# Patient Record
Sex: MALE | Race: WHITE | NOT HISPANIC OR LATINO | Employment: FULL TIME | URBAN - METROPOLITAN AREA
[De-identification: names, ages, dates, MRNs, and addresses within clinical notes are randomized per-mention and may not be internally consistent; named-entity substitution may affect disease eponyms.]

---

## 2017-01-29 LAB
25(OH)D3+25(OH)D2 SERPL-MCNC: 20.1 NG/ML (ref 30–100)
ALBUMIN SERPL-MCNC: 4.1 G/DL (ref 3.5–5.2)
ALBUMIN/GLOB SERPL: 1.7 G/DL
ALP SERPL-CCNC: 94 U/L (ref 39–117)
ALT SERPL-CCNC: 15 U/L (ref 1–41)
APPEARANCE UR: CLEAR
AST SERPL-CCNC: 13 U/L (ref 1–40)
BILIRUB SERPL-MCNC: 0.5 MG/DL (ref 0.1–1.2)
BILIRUB UR QL STRIP: NEGATIVE
BUN SERPL-MCNC: 15 MG/DL (ref 8–23)
BUN/CREAT SERPL: 15.6 (ref 7–25)
CALCIUM SERPL-MCNC: 9.6 MG/DL (ref 8.6–10.5)
CHLORIDE SERPL-SCNC: 100 MMOL/L (ref 98–107)
CHOLEST SERPL-MCNC: 122 MG/DL (ref 100–199)
CK SERPL-CCNC: 213 U/L (ref 20–200)
CO2 SERPL-SCNC: 24.2 MMOL/L (ref 22–29)
COLOR UR: YELLOW
CREAT SERPL-MCNC: 0.96 MG/DL (ref 0.76–1.27)
ERYTHROCYTE [DISTWIDTH] IN BLOOD BY AUTOMATED COUNT: 13.7 % (ref 11.5–14.5)
GLOBULIN SER CALC-MCNC: 2.4 GM/DL
GLUCOSE SERPL-MCNC: 175 MG/DL (ref 65–99)
GLUCOSE UR QL: NEGATIVE
HBA1C MFR BLD: 7.9 % (ref 4.8–5.6)
HCT VFR BLD AUTO: 45.4 % (ref 40.4–52.2)
HDL SERPL-SCNC: 31.8 UMOL/L
HDLC SERPL-MCNC: 44 MG/DL
HGB BLD-MCNC: 14.1 G/DL (ref 13.7–17.6)
HGB UR QL STRIP: NEGATIVE
KETONES UR QL STRIP: NEGATIVE
LDL SERPL QN: 20.6 NM
LDL SERPL-SCNC: 687 NMOL/L
LDL SMALL SERPL-SCNC: 313 NMOL/L
LDLC SERPL CALC-MCNC: 47 MG/DL (ref 0–99)
LEUKOCYTE ESTERASE UR QL STRIP: NEGATIVE
MCH RBC QN AUTO: 28.6 PG (ref 27–32.7)
MCHC RBC AUTO-ENTMCNC: 31.1 G/DL (ref 32.6–36.4)
MCV RBC AUTO: 92.1 FL (ref 79.8–96.2)
NITRITE UR QL STRIP: NEGATIVE
PH UR STRIP: 6 [PH] (ref 5–8)
PLATELET # BLD AUTO: 203 10*3/MM3 (ref 140–500)
POTASSIUM SERPL-SCNC: 4.5 MMOL/L (ref 3.5–5.2)
PROT SERPL-MCNC: 6.5 G/DL (ref 6–8.5)
PROT UR QL STRIP: NEGATIVE
PSA SERPL-MCNC: 0.32 NG/ML (ref 0–4)
RBC # BLD AUTO: 4.93 10*6/MM3 (ref 4.6–6)
SODIUM SERPL-SCNC: 139 MMOL/L (ref 136–145)
SP GR UR: 1.02 (ref 1–1.03)
T3FREE SERPL-MCNC: 3 PG/ML (ref 2–4.4)
T4 FREE SERPL-MCNC: 0.97 NG/DL (ref 0.93–1.7)
TRIGL SERPL-MCNC: 156 MG/DL (ref 0–149)
TSH SERPL DL<=0.005 MIU/L-ACNC: 4.01 MIU/ML (ref 0.27–4.2)
UROBILINOGEN UR STRIP-MCNC: NORMAL MG/DL
WBC # BLD AUTO: 10.02 10*3/MM3 (ref 4.5–10.7)

## 2017-02-03 ENCOUNTER — OFFICE VISIT (OUTPATIENT)
Dept: INTERNAL MEDICINE | Facility: CLINIC | Age: 62
End: 2017-02-03

## 2017-02-03 VITALS
HEIGHT: 75 IN | HEART RATE: 91 BPM | WEIGHT: 315 LBS | OXYGEN SATURATION: 94 % | DIASTOLIC BLOOD PRESSURE: 72 MMHG | SYSTOLIC BLOOD PRESSURE: 124 MMHG | BODY MASS INDEX: 39.17 KG/M2

## 2017-02-03 DIAGNOSIS — R35.1 NOCTURIA: Chronic | ICD-10-CM

## 2017-02-03 DIAGNOSIS — Z00.00 ROUTINE PHYSICAL EXAMINATION: Primary | ICD-10-CM

## 2017-02-03 DIAGNOSIS — Z87.39 HISTORY OF GOUT: Chronic | ICD-10-CM

## 2017-02-03 DIAGNOSIS — D64.9 CHRONIC ANEMIA: Chronic | ICD-10-CM

## 2017-02-03 DIAGNOSIS — Z51.81 THERAPEUTIC DRUG MONITORING: ICD-10-CM

## 2017-02-03 DIAGNOSIS — E11.9 ENCOUNTER FOR DIABETIC FOOT EXAM (HCC): ICD-10-CM

## 2017-02-03 DIAGNOSIS — Z23 NEED FOR PROPHYLACTIC VACCINATION AGAINST STREPTOCOCCUS PNEUMONIAE (PNEUMOCOCCUS): ICD-10-CM

## 2017-02-03 DIAGNOSIS — Z90.49 HISTORY OF PARTIAL COLECTOMY: Chronic | ICD-10-CM

## 2017-02-03 DIAGNOSIS — N32.89 IRRITABLE BLADDER: Chronic | ICD-10-CM

## 2017-02-03 DIAGNOSIS — E11.9 DIABETIC EYE EXAM (HCC): Chronic | ICD-10-CM

## 2017-02-03 DIAGNOSIS — Z01.00 DIABETIC EYE EXAM (HCC): Chronic | ICD-10-CM

## 2017-02-03 DIAGNOSIS — I10 BENIGN ESSENTIAL HYPERTENSION: Chronic | ICD-10-CM

## 2017-02-03 DIAGNOSIS — N40.1 BENIGN NON-NODULAR PROSTATIC HYPERPLASIA WITH LOWER URINARY TRACT SYMPTOMS: Chronic | ICD-10-CM

## 2017-02-03 DIAGNOSIS — Z92.29 HISTORY OF PNEUMOCOCCAL VACCINATION: ICD-10-CM

## 2017-02-03 DIAGNOSIS — E55.9 VITAMIN D DEFICIENCY: Chronic | ICD-10-CM

## 2017-02-03 DIAGNOSIS — K52.9 CHRONIC DIARRHEA: Chronic | ICD-10-CM

## 2017-02-03 DIAGNOSIS — E11.9 TYPE 2 DIABETES MELLITUS WITHOUT COMPLICATION, WITHOUT LONG-TERM CURRENT USE OF INSULIN (HCC): Chronic | ICD-10-CM

## 2017-02-03 DIAGNOSIS — E03.9 HYPOTHYROIDISM, UNSPECIFIED TYPE: Chronic | ICD-10-CM

## 2017-02-03 DIAGNOSIS — E78.5 HYPERLIPIDEMIA, UNSPECIFIED HYPERLIPIDEMIA TYPE: Chronic | ICD-10-CM

## 2017-02-03 PROCEDURE — 90471 IMMUNIZATION ADMIN: CPT | Performed by: INTERNAL MEDICINE

## 2017-02-03 PROCEDURE — 99214 OFFICE O/P EST MOD 30 MIN: CPT | Performed by: INTERNAL MEDICINE

## 2017-02-03 PROCEDURE — 99396 PREV VISIT EST AGE 40-64: CPT | Performed by: INTERNAL MEDICINE

## 2017-02-03 PROCEDURE — 90732 PPSV23 VACC 2 YRS+ SUBQ/IM: CPT | Performed by: INTERNAL MEDICINE

## 2017-02-03 NOTE — PROGRESS NOTES
02/03/2017    Patient Information  Navarro Bruno                                                                                          [address] [facility] [address]      1955  [phone]      Chief Complaint:     Routine physical examination and follow-up.  No new acute complaints but has return of irritable bladder symptoms and nocturia.    History of Present Illness:    Patient with a history of type 2 diabetes that has not been an optimal control, hyperlipidemia, hypothyroidism, irritable bladder and nocturia, chronic diarrhea, history of gout, BPH, history of partial colectomy due to an obstruction, hypertension, chronic anemia.  He presents today for his routine annual exam and follow-up lab work.  He is having return of irritable bladder symptoms and this will be described below.  Past medical history reviewed and updated where necessary.  This reveals patient needs his initial pneumococcal vaccination.  It also reveals he is up-to-date on his colonoscopy.  He needs to have a diabetic eye examination.  Diabetic foot exam performed today.    Review of Systems   Constitution: Negative.   HENT: Negative.    Eyes: Negative.    Cardiovascular: Negative.    Respiratory: Negative.    Endocrine: Negative.    Hematologic/Lymphatic: Negative.    Skin: Negative.    Musculoskeletal: Negative.    Gastrointestinal: Negative.    Genitourinary: Positive for frequency, nocturia and urgency.   Neurological: Negative.    Psychiatric/Behavioral: Negative.    Allergic/Immunologic: Negative.        Active Problems:    Patient Active Problem List   Diagnosis   • Chronic anemia   • Benign essential hypertension   • Cataracts, bilateral   • Hyperlipidemia   • Hypothyroidism   • Irritable bladder   • Nocturia   • Type 2 diabetes mellitus   • Vitamin D deficiency   • Therapeutic drug monitoring   • Routine physical examination   • Chronic diarrhea   • History of partial colectomy,  12/17/2014--ileo-cecal colectomy with primary reanastomosis.  Obstruction secondary to NSAID-induced inflamed ileum.   • History of gout   • Benign prostatic hypertrophy   • Diabetic eye exam   • Diabetic foot exam         Past Medical History   Diagnosis Date   • History of echocardiogram 12/14/2014 12/14/2014--echocardiogram reveals left ventricular chamber size is normal. Mild concentric left ventricular hypertrophy. Normal left ventricular systolic function with ejection fraction of 58%. Abnormal left ventricular diastolic function is observed. Left Atrium normal. Right ventricular cavity size is mildly enlarged. The right ventricular global systolic function is normal. Right atrium is nor   • History of Hepatitis, infectious Age 18     18 years of age--patient had infectious hepatitis. Exact type unknown.   • History of partial colectomy, 12/17/2014--ileo-cecal colectomy with primary reanastomosis. 12/17/2014 12/17/2014--patient presented with a small bowel obstruction secondary to inflamed terminal ileum. He underwent ileo-cecal colectomy with primary reanastomosis.   • History of pneumococcal vaccination 2/3/2017     02/03/2017--PPSV 23 given.  Patient is pneumococcal vaccination naïve.   • History of small bowel obstruction 12/15/2014     01/19/2015--patient seen in follow-up and is doing well. No change in current medical regimen at the present time. Set up fasting lab and follow up. I explained to patient that we will need to monitor him for the development of vitamin B12 deficiency. He will certainly be at risk for this.   12/17/2014--patient presented with a small bowel obstruction secondary to inflamed terminal ileum. He under         Past Surgical History   Procedure Laterality Date   • Colonoscopy  04/06/2015 04/06/2015--colonoscopy revealed small internal hemorrhoids. Well healed ileocolic anastomosis. Fair prep.   • Exploratory laparotomy  12/17/2014 12/17/2014--patient presented  with a small bowel obstruction secondary to inflamed terminal ileum. He underwent ileo-cecal colectomy with primary reanastomosis.   • Colectomy partial / total  12/17/2014 12/17/2014--patient presented with a small bowel obstruction secondary to inflamed terminal ileum. He underwent ileo-cecal colectomy with primary reanastomosis.   • Amputation foot / toe  --     19 years of age--left great toe and left second toe amputation from a lawnmower accident.   • Appendectomy  --     11 years of age.         No Known Allergies        Current Outpatient Prescriptions:   •  aspirin 81 MG tablet, Take 1 tablet by mouth daily., Disp: , Rfl:   •  atorvastatin (LIPITOR) 80 MG tablet, Take 1 tablet by mouth every night., Disp: 30 tablet, Rfl: 11  •  Cholecalciferol (VITAMIN D3) 5000 UNITS capsule capsule, 1 by mouth daily as directed, Disp: 30 capsule, Rfl: 11  •  glimepiride (AMARYL) 4 MG tablet, 1 by mouth daily for diabetes, Disp: 90 tablet, Rfl: 3  •  metoprolol tartrate (LOPRESSOR) 25 MG tablet, Take 1 tablet by mouth 2 (two) times a day., Disp: 180 tablet, Rfl: 3  •  SitaGLIPtin-MetFORMIN HCl ER (JANUMET XR)  MG tablet sustained-release 24 hour, 1 by mouth twice a day with food, Disp: 60 tablet, Rfl: 11      Family History   Problem Relation Age of Onset   • Scoliosis Mother      Amyotrophic Lateral Sclerosis   • Stroke Father      Father had multiple strokes.   • Diabetes Father      Type 2   • Diabetes Paternal Grandfather      Type 2         Social History     Social History   • Marital status:      Spouse name: N/A   • Number of children: N/A   • Years of education: N/A     Occupational History   •  for [employer]      Social History Main Topics   • Smoking status: Former Smoker     Quit date: 12/2014   • Smokeless tobacco: Never Used   • Alcohol use Yes      Comment: Minimum consumption   • Drug use: No   • Sexual activity: Yes     Partners: Female     Other Topics Concern   •  "Not on file     Social History Narrative         Vitals:    02/03/17 1101   BP: 124/72   Pulse: 91   SpO2: 94%   Weight: (!) 333 lb (151 kg)   Height: 75\" (190.5 cm)          Physical Exam:    General: Alert and oriented x 3, with appropriate affect; no acute distress. Obese. HEENT: pupils equal, round, and reactive to light; extraocular movements intact; sclera nonicteric; nasal mucosa normal; pharynx normal; tympanic membranes and ear canals normal.  Neck: without JVD, thyromegaly, bruit, or adenopathy.  Lungs: clear to auscultation in all fields.  Heart: auscultation reveals regular rate and rhythm without murmur, rub, gallop, or click.  Abdomen: is soft and nontender, without hepato-splenomegaly, mass or hernia. Normal bowel sounds; .  Urologic exam: reveals normal male genitalia without testicular mass or penile/scrotal lesion.  Digital rectal exam and Prostate: deferred.  Extremities: are without clubbing, cyanosis, or edema.  Vascular: no signs of peripheral arterial disease or venous insufficiency/varicosities.  Neurological: intact without focal deficit, including cranial and peripheral nerves.  Station and gait observed to be normal during ingress and egress from the examination area.  Sensation and deep tendon reflexes tested if clinically indicated and are normal.  Musculoskeletal: exam is normal, without signs of synovitis, significant degeneration or deformity. Skin examination: without rash or significant lesions.  Examination of his feet documented under diabetic foot exam.      Lab/other results:    NMR is nearly perfect.  The only thing abnormal is his triglycerides are slightly elevated 156.  CMP normal except blood sugar elevated 175.  Urinalysis normal.  CBC normal.  Hemoglobin A1c 7.9.  Thyroid function tests normal.  PSA normal at 0.321.  Vitamin D low at 20.1.  CPK slightly elevated at 213.    Assessment/Plan:     Diagnosis Plan   1. Routine physical examination     2. Type 2 diabetes " mellitus without complication, without long-term current use of insulin     3. Hyperlipidemia, unspecified hyperlipidemia type     4. Hypothyroidism, unspecified type     5. Irritable bladder     6. Nocturia     7. Chronic diarrhea     8. History of gout     9. Benign prostatic hypertrophy     10. History of partial colectomy, 12/17/2014--ileo-cecal colectomy with primary reanastomosis.  Obstruction secondary to NSAID-induced inflamed ileum.     11. Vitamin D deficiency     12. Benign essential hypertension     13. Chronic anemia     14. Diabetic foot exam     15. History of pneumococcal vaccination     16. Diabetic eye exam     17. Therapeutic drug monitoring         Patient presents with essentially normal annual exam except for the following issues: Type 2 diabetes is not at goal and therefore medication adjustment indicated.  We discussed various options to reach our goal.  I do not think that it would be in his best interest to add a glucosuric agent such as Farxiga because of his underlying irritable bladder symptoms and nocturia.  The least expensive thing to do would be to increase the glimepiride to twice daily but this will cause him to gain even more weight.  His weight is up quite a bit from the last visit.  Overall, I think the best thing to do would be to start Victoza.  Hyperlipidemia is under excellent control and we will continue the generic Lipitor.  Thyroid function tests are normal and there may not be a problem with hypothyroidism at all.  Patient has return of irritable bladder symptoms and nocturia that seems to be intermittent.  Poorly controlled diabetes could be contributing to this but I will reinitiate Myrbetriq.  No significant obstructive BPH symptoms.  His vitamin D is low and he has been taking 5000 units of vitamin D on a regular basis.  I've asked him to check and make sure this is vitamin D3 and he should take it with food.  I am reluctant to increase his dose any higher at the  present time.  Blood pressure controlled.  Patient needs a diabetic eye examination.  Patient has a history of gout and stopped taking his allopurinol because he had not had an attack for quite some time.  This does need to be reassessed.    Plan is as follows: Start Victoza and titrate up to 1.8 mg subcutaneously daily.  Patient should continue Janumet and glimepiride.  Check uric acid level today and follow-up on the phone for the results.  PPSV 23 given.  No further changes in medical regimen.  Ophthalmology referral given.           Procedures

## 2017-02-04 ENCOUNTER — RESULTS ENCOUNTER (OUTPATIENT)
Dept: INTERNAL MEDICINE | Facility: CLINIC | Age: 62
End: 2017-02-04

## 2017-02-04 DIAGNOSIS — E03.9 HYPOTHYROIDISM, UNSPECIFIED TYPE: Chronic | ICD-10-CM

## 2017-02-04 DIAGNOSIS — E55.9 VITAMIN D DEFICIENCY: Chronic | ICD-10-CM

## 2017-02-04 DIAGNOSIS — E78.5 HYPERLIPIDEMIA, UNSPECIFIED HYPERLIPIDEMIA TYPE: Chronic | ICD-10-CM

## 2017-02-04 DIAGNOSIS — D64.9 CHRONIC ANEMIA: Chronic | ICD-10-CM

## 2017-02-04 DIAGNOSIS — E11.9 TYPE 2 DIABETES MELLITUS WITHOUT COMPLICATION, WITHOUT LONG-TERM CURRENT USE OF INSULIN (HCC): Chronic | ICD-10-CM

## 2017-02-04 LAB — URATE SERPL-MCNC: 7.3 MG/DL (ref 3.4–7)

## 2017-03-06 DIAGNOSIS — E11.9 TYPE 2 DIABETES MELLITUS WITHOUT COMPLICATION, WITHOUT LONG-TERM CURRENT USE OF INSULIN (HCC): Chronic | ICD-10-CM

## 2017-04-03 DIAGNOSIS — E11.9 TYPE 2 DIABETES MELLITUS WITHOUT COMPLICATION (HCC): ICD-10-CM

## 2017-04-03 RX ORDER — GLIMEPIRIDE 4 MG/1
TABLET ORAL
Qty: 90 TABLET | Refills: 0 | Status: SHIPPED | OUTPATIENT
Start: 2017-04-03 | End: 2017-07-19 | Stop reason: SDUPTHER

## 2017-04-21 DIAGNOSIS — I10 BENIGN ESSENTIAL HYPERTENSION: ICD-10-CM

## 2017-04-21 DIAGNOSIS — E78.5 HYPERLIPIDEMIA: ICD-10-CM

## 2017-04-24 RX ORDER — ATORVASTATIN CALCIUM 80 MG/1
TABLET, FILM COATED ORAL
Qty: 30 TABLET | Refills: 0 | Status: SHIPPED | OUTPATIENT
Start: 2017-04-24 | End: 2017-06-02 | Stop reason: SDUPTHER

## 2017-05-11 DIAGNOSIS — E78.5 HYPERLIPIDEMIA, UNSPECIFIED HYPERLIPIDEMIA TYPE: Chronic | ICD-10-CM

## 2017-05-11 DIAGNOSIS — E03.9 ACQUIRED HYPOTHYROIDISM: Chronic | ICD-10-CM

## 2017-05-11 DIAGNOSIS — I10 BENIGN ESSENTIAL HYPERTENSION: Primary | Chronic | ICD-10-CM

## 2017-05-11 DIAGNOSIS — Z51.81 THERAPEUTIC DRUG MONITORING: ICD-10-CM

## 2017-05-11 DIAGNOSIS — E11.00 TYPE 2 DIABETES MELLITUS WITH HYPEROSMOLARITY WITHOUT COMA, WITHOUT LONG-TERM CURRENT USE OF INSULIN (HCC): Chronic | ICD-10-CM

## 2017-05-11 DIAGNOSIS — Z11.59 NEED FOR HEPATITIS C SCREENING TEST: ICD-10-CM

## 2017-05-11 DIAGNOSIS — E55.9 VITAMIN D DEFICIENCY: Chronic | ICD-10-CM

## 2017-05-12 ENCOUNTER — RESULTS ENCOUNTER (OUTPATIENT)
Dept: INTERNAL MEDICINE | Facility: CLINIC | Age: 62
End: 2017-05-12

## 2017-05-12 DIAGNOSIS — Z11.59 NEED FOR HEPATITIS C SCREENING TEST: ICD-10-CM

## 2017-05-12 DIAGNOSIS — Z51.81 THERAPEUTIC DRUG MONITORING: ICD-10-CM

## 2017-05-12 DIAGNOSIS — E03.9 ACQUIRED HYPOTHYROIDISM: Chronic | ICD-10-CM

## 2017-05-12 DIAGNOSIS — E78.5 HYPERLIPIDEMIA, UNSPECIFIED HYPERLIPIDEMIA TYPE: Chronic | ICD-10-CM

## 2017-05-12 DIAGNOSIS — E11.00 TYPE 2 DIABETES MELLITUS WITH HYPEROSMOLARITY WITHOUT COMA, WITHOUT LONG-TERM CURRENT USE OF INSULIN (HCC): Chronic | ICD-10-CM

## 2017-05-12 DIAGNOSIS — I10 BENIGN ESSENTIAL HYPERTENSION: Chronic | ICD-10-CM

## 2017-05-12 DIAGNOSIS — E55.9 VITAMIN D DEFICIENCY: Chronic | ICD-10-CM

## 2017-05-14 LAB
25(OH)D3+25(OH)D2 SERPL-MCNC: 21 NG/ML (ref 30–100)
ALBUMIN SERPL-MCNC: 4.1 G/DL (ref 3.5–5.2)
ALBUMIN/GLOB SERPL: 1.4 G/DL
ALP SERPL-CCNC: 98 U/L (ref 39–117)
ALT SERPL-CCNC: 14 U/L (ref 1–41)
APPEARANCE UR: CLEAR
AST SERPL-CCNC: 16 U/L (ref 1–40)
BILIRUB SERPL-MCNC: 0.6 MG/DL (ref 0.1–1.2)
BILIRUB UR QL STRIP: NEGATIVE
BUN SERPL-MCNC: 17 MG/DL (ref 8–23)
BUN/CREAT SERPL: 16.2 (ref 7–25)
CALCIUM SERPL-MCNC: 10 MG/DL (ref 8.6–10.5)
CHLORIDE SERPL-SCNC: 101 MMOL/L (ref 98–107)
CHOLEST SERPL-MCNC: 119 MG/DL (ref 100–199)
CK SERPL-CCNC: 96 U/L (ref 20–200)
CO2 SERPL-SCNC: 23 MMOL/L (ref 22–29)
COLOR UR: YELLOW
CREAT SERPL-MCNC: 1.05 MG/DL (ref 0.76–1.27)
ERYTHROCYTE [DISTWIDTH] IN BLOOD BY AUTOMATED COUNT: 14 % (ref 11.5–14.5)
GLOBULIN SER CALC-MCNC: 2.9 GM/DL
GLUCOSE SERPL-MCNC: 188 MG/DL (ref 65–99)
GLUCOSE UR QL: NEGATIVE
HBA1C MFR BLD: 8.3 % (ref 4.8–5.6)
HCT VFR BLD AUTO: 43.5 % (ref 40.4–52.2)
HCV AB S/CO SERPL IA: <0.1 S/CO RATIO (ref 0–0.9)
HDL SERPL-SCNC: 31.4 UMOL/L
HDLC SERPL-MCNC: 40 MG/DL
HGB BLD-MCNC: 14 G/DL (ref 13.7–17.6)
HGB UR QL STRIP: NEGATIVE
KETONES UR QL STRIP: NEGATIVE
LDL SERPL QN: 20.1 NM
LDL SERPL-SCNC: 770 NMOL/L
LDL SMALL SERPL-SCNC: 478 NMOL/L
LDLC SERPL CALC-MCNC: 44 MG/DL (ref 0–99)
LEUKOCYTE ESTERASE UR QL STRIP: NEGATIVE
MCH RBC QN AUTO: 29.2 PG (ref 27–32.7)
MCHC RBC AUTO-ENTMCNC: 32.2 G/DL (ref 32.6–36.4)
MCV RBC AUTO: 90.6 FL (ref 79.8–96.2)
NITRITE UR QL STRIP: NEGATIVE
PH UR STRIP: 6 [PH] (ref 5–8)
PLATELET # BLD AUTO: 230 10*3/MM3 (ref 140–500)
POTASSIUM SERPL-SCNC: 4.5 MMOL/L (ref 3.5–5.2)
PROT SERPL-MCNC: 7 G/DL (ref 6–8.5)
PROT UR QL STRIP: NEGATIVE
RBC # BLD AUTO: 4.8 10*6/MM3 (ref 4.6–6)
SODIUM SERPL-SCNC: 141 MMOL/L (ref 136–145)
SP GR UR: 1.02 (ref 1–1.03)
T3FREE SERPL-MCNC: 3.2 PG/ML (ref 2–4.4)
T4 FREE SERPL-MCNC: 1.11 NG/DL (ref 0.93–1.7)
TRIGL SERPL-MCNC: 173 MG/DL (ref 0–149)
TSH SERPL DL<=0.005 MIU/L-ACNC: 5.33 MIU/ML (ref 0.27–4.2)
UROBILINOGEN UR STRIP-MCNC: NORMAL MG/DL
WBC # BLD AUTO: 9.42 10*3/MM3 (ref 4.5–10.7)

## 2017-05-19 ENCOUNTER — OFFICE VISIT (OUTPATIENT)
Dept: INTERNAL MEDICINE | Facility: CLINIC | Age: 62
End: 2017-05-19

## 2017-05-19 VITALS
OXYGEN SATURATION: 96 % | SYSTOLIC BLOOD PRESSURE: 140 MMHG | WEIGHT: 315 LBS | BODY MASS INDEX: 39.17 KG/M2 | DIASTOLIC BLOOD PRESSURE: 76 MMHG | HEART RATE: 89 BPM | HEIGHT: 75 IN

## 2017-05-19 DIAGNOSIS — I10 BENIGN ESSENTIAL HYPERTENSION: Chronic | ICD-10-CM

## 2017-05-19 DIAGNOSIS — Z87.39 HISTORY OF GOUT: Chronic | ICD-10-CM

## 2017-05-19 DIAGNOSIS — D64.9 CHRONIC ANEMIA: Chronic | ICD-10-CM

## 2017-05-19 DIAGNOSIS — E11.9 TYPE 2 DIABETES MELLITUS WITHOUT COMPLICATION, WITHOUT LONG-TERM CURRENT USE OF INSULIN (HCC): Primary | Chronic | ICD-10-CM

## 2017-05-19 DIAGNOSIS — E78.5 HYPERLIPIDEMIA, UNSPECIFIED HYPERLIPIDEMIA TYPE: Chronic | ICD-10-CM

## 2017-05-19 DIAGNOSIS — Z51.81 THERAPEUTIC DRUG MONITORING: ICD-10-CM

## 2017-05-19 DIAGNOSIS — E03.9 HYPOTHYROIDISM, UNSPECIFIED TYPE: Chronic | ICD-10-CM

## 2017-05-19 DIAGNOSIS — E55.9 VITAMIN D DEFICIENCY: Chronic | ICD-10-CM

## 2017-05-19 PROCEDURE — 99214 OFFICE O/P EST MOD 30 MIN: CPT | Performed by: INTERNAL MEDICINE

## 2017-05-19 RX ORDER — PEN NEEDLE, DIABETIC 31 GX5/16"
NEEDLE, DISPOSABLE MISCELLANEOUS
Refills: 3 | COMMUNITY
Start: 2017-05-15

## 2017-05-19 RX ORDER — LEVOTHYROXINE SODIUM 0.05 MG/1
TABLET ORAL
Qty: 30 TABLET | Refills: 6 | Status: SHIPPED | OUTPATIENT
Start: 2017-05-19 | End: 2017-12-21 | Stop reason: SDUPTHER

## 2017-06-02 DIAGNOSIS — E78.5 HYPERLIPIDEMIA: ICD-10-CM

## 2017-06-02 RX ORDER — ATORVASTATIN CALCIUM 80 MG/1
TABLET, FILM COATED ORAL
Qty: 30 TABLET | Refills: 2 | Status: SHIPPED | OUTPATIENT
Start: 2017-06-02 | End: 2017-12-10 | Stop reason: SDUPTHER

## 2017-07-19 DIAGNOSIS — E11.9 TYPE 2 DIABETES MELLITUS WITHOUT COMPLICATION (HCC): ICD-10-CM

## 2017-07-19 RX ORDER — GLIMEPIRIDE 4 MG/1
TABLET ORAL
Qty: 30 TABLET | Refills: 0 | Status: SHIPPED | OUTPATIENT
Start: 2017-07-19 | End: 2017-08-23 | Stop reason: SDUPTHER

## 2017-08-01 DIAGNOSIS — I10 BENIGN ESSENTIAL HYPERTENSION: ICD-10-CM

## 2017-08-01 DIAGNOSIS — E11.9 TYPE 2 DIABETES MELLITUS WITHOUT COMPLICATION, WITHOUT LONG-TERM CURRENT USE OF INSULIN (HCC): Chronic | ICD-10-CM

## 2017-08-10 DIAGNOSIS — Z87.39 HISTORY OF GOUT: Chronic | ICD-10-CM

## 2017-08-10 DIAGNOSIS — E11.9 TYPE 2 DIABETES MELLITUS WITHOUT COMPLICATION, WITHOUT LONG-TERM CURRENT USE OF INSULIN (HCC): Chronic | ICD-10-CM

## 2017-08-10 DIAGNOSIS — E03.9 HYPOTHYROIDISM, UNSPECIFIED TYPE: Chronic | ICD-10-CM

## 2017-08-10 DIAGNOSIS — E78.5 HYPERLIPIDEMIA, UNSPECIFIED HYPERLIPIDEMIA TYPE: Chronic | ICD-10-CM

## 2017-08-13 LAB
ALBUMIN SERPL-MCNC: 4.2 G/DL (ref 3.5–5.2)
ALBUMIN/GLOB SERPL: 1.4 G/DL
ALP SERPL-CCNC: 94 U/L (ref 39–117)
ALT SERPL-CCNC: 16 U/L (ref 1–41)
AST SERPL-CCNC: 12 U/L (ref 1–40)
BILIRUB SERPL-MCNC: 0.6 MG/DL (ref 0.1–1.2)
BUN SERPL-MCNC: 17 MG/DL (ref 8–23)
BUN/CREAT SERPL: 15.7 (ref 7–25)
CALCIUM SERPL-MCNC: 10.1 MG/DL (ref 8.6–10.5)
CHLORIDE SERPL-SCNC: 101 MMOL/L (ref 98–107)
CHOLEST SERPL-MCNC: 112 MG/DL (ref 100–199)
CK SERPL-CCNC: 75 U/L (ref 20–200)
CO2 SERPL-SCNC: 22.7 MMOL/L (ref 22–29)
CREAT SERPL-MCNC: 1.08 MG/DL (ref 0.76–1.27)
GLOBULIN SER CALC-MCNC: 2.9 GM/DL
GLUCOSE SERPL-MCNC: 139 MG/DL (ref 65–99)
HBA1C MFR BLD: 7.6 % (ref 4.8–5.6)
HDL SERPL-SCNC: 30.1 UMOL/L
HDLC SERPL-MCNC: 41 MG/DL
LDL SERPL QN: 20.6 NM
LDL SERPL-SCNC: 685 NMOL/L
LDL SMALL SERPL-SCNC: 323 NMOL/L
LDLC SERPL CALC-MCNC: 33 MG/DL (ref 0–99)
POTASSIUM SERPL-SCNC: 4.7 MMOL/L (ref 3.5–5.2)
PROT SERPL-MCNC: 7.1 G/DL (ref 6–8.5)
SODIUM SERPL-SCNC: 139 MMOL/L (ref 136–145)
T3FREE SERPL-MCNC: 3 PG/ML (ref 2–4.4)
T4 FREE SERPL-MCNC: 1.17 NG/DL (ref 0.93–1.7)
TRIGL SERPL-MCNC: 188 MG/DL (ref 0–149)
TSH SERPL DL<=0.005 MIU/L-ACNC: 3.29 MIU/ML (ref 0.27–4.2)
URATE SERPL-MCNC: 9.2 MG/DL (ref 3.4–7)

## 2017-08-18 ENCOUNTER — OFFICE VISIT (OUTPATIENT)
Dept: INTERNAL MEDICINE | Facility: CLINIC | Age: 62
End: 2017-08-18

## 2017-08-18 VITALS
WEIGHT: 309 LBS | OXYGEN SATURATION: 98 % | HEIGHT: 75 IN | BODY MASS INDEX: 38.42 KG/M2 | DIASTOLIC BLOOD PRESSURE: 80 MMHG | HEART RATE: 86 BPM | SYSTOLIC BLOOD PRESSURE: 122 MMHG

## 2017-08-18 DIAGNOSIS — Z51.81 THERAPEUTIC DRUG MONITORING: ICD-10-CM

## 2017-08-18 DIAGNOSIS — E78.5 HYPERLIPIDEMIA, UNSPECIFIED HYPERLIPIDEMIA TYPE: Chronic | ICD-10-CM

## 2017-08-18 DIAGNOSIS — E03.9 HYPOTHYROIDISM, UNSPECIFIED TYPE: Chronic | ICD-10-CM

## 2017-08-18 DIAGNOSIS — D64.9 CHRONIC ANEMIA: Chronic | ICD-10-CM

## 2017-08-18 DIAGNOSIS — E11.9 TYPE 2 DIABETES MELLITUS WITHOUT COMPLICATION, WITHOUT LONG-TERM CURRENT USE OF INSULIN (HCC): Primary | Chronic | ICD-10-CM

## 2017-08-18 DIAGNOSIS — Z87.39 HISTORY OF GOUT: Chronic | ICD-10-CM

## 2017-08-18 DIAGNOSIS — I10 BENIGN ESSENTIAL HYPERTENSION: Chronic | ICD-10-CM

## 2017-08-18 PROCEDURE — 99213 OFFICE O/P EST LOW 20 MIN: CPT | Performed by: INTERNAL MEDICINE

## 2017-08-18 RX ORDER — ALLOPURINOL 300 MG/1
TABLET ORAL
Qty: 30 TABLET | Refills: 11 | Status: SHIPPED | OUTPATIENT
Start: 2017-08-18 | End: 2018-09-28 | Stop reason: SDUPTHER

## 2017-08-18 NOTE — PROGRESS NOTES
08/18/2017    Patient Information  Navarro Bruno                                                                                          [address] [facility] [address]      1955  [phone]      Chief Complaint:     Follow-up type 2 diabetes, hyperlipidemia, hypothyroidism, hypertension, gout.  No new acute complaints.    History of Present Illness:    Patient with a history of medical problems as outlined in the chief complaint presents today for a lab and follow-up.  His type 2 diabetes has been under poor control for various reasons including the cost of medications.  This is particularly true in that patient no longer has any insurance.  Past medical history reviewed and updated where necessary including health maintenance parameters.  Patient is not up-to-date primarily because of the lack of insurance.    Review of Systems   Constitution: Negative.   HENT: Negative.    Eyes: Negative.    Cardiovascular: Negative.    Respiratory: Negative.    Endocrine: Negative.    Hematologic/Lymphatic: Negative.    Skin: Negative.    Musculoskeletal: Negative.    Gastrointestinal: Negative.    Genitourinary: Negative.    Neurological: Negative.    Psychiatric/Behavioral: Negative.    Allergic/Immunologic: Negative.        Active Problems:    Patient Active Problem List   Diagnosis   • Chronic anemia   • Benign essential hypertension   • Hyperlipidemia   • Hypothyroidism   • Irritable bladder   • Nocturia   • Type 2 diabetes mellitus   • Vitamin D deficiency   • Therapeutic drug monitoring   • Routine physical examination   • Chronic diarrhea   • History of partial colectomy, 12/17/2014--ileo-cecal colectomy with primary reanastomosis.  Obstruction secondary to NSAID-induced inflamed ileum.   • Gout   • Benign prostatic hypertrophy   • Diabetic eye exam   • Diabetic foot exam         Past Medical History:   Diagnosis Date   • History of echocardiogram 12/14/2014 12/14/2014--echocardiogram  reveals left ventricular chamber size is normal. Mild concentric left ventricular hypertrophy. Normal left ventricular systolic function with ejection fraction of 58%. Abnormal left ventricular diastolic function is observed. Left Atrium normal. Right ventricular cavity size is mildly enlarged. The right ventricular global systolic function is normal. Right atrium is nor   • History of Hepatitis, infectious Age 18    18 years of age--patient had infectious hepatitis. Exact type unknown.   • History of partial colectomy, 12/17/2014--ileo-cecal colectomy with primary reanastomosis. 12/17/2014 12/17/2014--patient presented with a small bowel obstruction secondary to inflamed terminal ileum. He underwent ileo-cecal colectomy with primary reanastomosis.   • History of pneumococcal vaccination 2/3/2017    02/03/2017--PPSV 23 given.  Patient is pneumococcal vaccination naïve.   • History of small bowel obstruction 12/15/2014    01/19/2015--patient seen in follow-up and is doing well. No change in current medical regimen at the present time. Set up fasting lab and follow up. I explained to patient that we will need to monitor him for the development of vitamin B12 deficiency. He will certainly be at risk for this.   12/17/2014--patient presented with a small bowel obstruction secondary to inflamed terminal ileum. He under         Past Surgical History:   Procedure Laterality Date   • AMPUTATION FOOT / TOE  18 years old    18 years of age--left great toe and left second toe amputation from a lawnmower accident.   • APPENDECTOMY  11 years old    11 years of age.   • CATARACT EXTRACTION Bilateral 2013 2013--patient reports he had bilateral cataract surgery about 4 years ago.  Intraocular lenses placed.   • COLECTOMY PARTIAL / TOTAL  12/17/2014 12/17/2014--patient presented with a small bowel obstruction secondary to inflamed terminal ileum. He underwent ileo-cecal colectomy with primary reanastomosis.   • COLONOSCOPY   04/06/2015 04/06/2015--colonoscopy revealed small internal hemorrhoids. Well healed ileocolic anastomosis. Fair prep.   • EXPLORATORY LAPAROTOMY  12/17/2014 12/17/2014--patient presented with a small bowel obstruction secondary to inflamed terminal ileum. He underwent ileo-cecal colectomy with primary reanastomosis.         No Known Allergies        Current Outpatient Prescriptions:   •  atorvastatin (LIPITOR) 80 MG tablet, TAKE 1 TABLET BY MOUTH ONE TIME A DAY AT NIGHT, Disp: 30 tablet, Rfl: 2  •  B-D ULTRAFINE III SHORT PEN 31G X 8 MM misc, Use with Victoza injections daily., Disp: , Rfl: 3  •  Cholecalciferol (VITAMIN D3) 5000 UNITS capsule capsule, 1 by mouth daily as directed, Disp: 30 capsule, Rfl: 11  •  glimepiride (AMARYL) 4 MG tablet, TAKE 1 TABLET BY MOUTH ONE TIME A DAY FOR DIABETES, Disp: 30 tablet, Rfl: 0  •  levothyroxine (SYNTHROID) 50 MCG tablet, 1 by mouth daily for thyroid, Disp: 30 tablet, Rfl: 6  •  Liraglutide (VICTOZA) 18 MG/3ML solution pen-injector, Inject 1.8 mg subcutaneously daily as directed., Disp: 3 pen, Rfl: 1  •  metoprolol tartrate (LOPRESSOR) 25 MG tablet, TAKE 1 TABLET BY MOUTH TWO TIMES A DAY , Disp: 60 tablet, Rfl: 0  •  SitaGLIPtin-MetFORMIN HCl ER (JANUMET XR)  MG tablet sustained-release 24 hour, 1 by mouth twice a day with food, Disp: 60 tablet, Rfl: 11      Family History   Problem Relation Age of Onset   • Scoliosis Mother      Amyotrophic Lateral Sclerosis   • Stroke Father      Father had multiple strokes.   • Diabetes Father      Type 2   • Diabetes Paternal Grandfather      Type 2         Social History     Social History   • Marital status:      Spouse name: N/A   • Number of children: N/A   • Years of education: N/A     Occupational History   •  for [employer]      Social History Main Topics   • Smoking status: Former Smoker     Quit date: 12/2014   • Smokeless tobacco: Never Used   • Alcohol use Yes      Comment: Minimum  "consumption   • Drug use: No   • Sexual activity: Yes     Partners: Female     Other Topics Concern   • Not on file     Social History Narrative         Vitals:    08/18/17 0833   BP: 122/80   Pulse: 86   SpO2: 98%   Weight: (!) 309 lb (140 kg)   Height: 75\" (190.5 cm)          Physical Exam:    General: Alert and oriented x 3. Obese.  No acute distress.  Normal affect.  HEENT: Pupils equal, round, reactive to light; extraocular movements intact; sclerae nonicteric; pharynx, ear canals and TMs normal.  Neck: Without JVD, thyromegaly, bruit, or adenopathy.  Lungs: Clear to auscultation in all fields.  Heart: Regular rate and rhythm without murmur, rub, gallop, or click.  Abdomen: Soft, nontender, without hepatosplenomegaly or hernia.  Bowel sounds normal.  : Deferred.  Rectal: Deferred.  Extremities: Without clubbing, cyanosis, edema, or pulse deficit.  Neurologic: Intact without focal deficit.  Normal station and gait observed during ingress and egress from the examination room.  Skin: Without significant lesion.  Musculoskeletal: Unremarkable.      Lab/other results:    NMR is perfect except triglycerides mildly elevated at 188, HDL particle number low at 30.1.  CMP normal except blood sugar elevated in the 139.  Hemoglobin A1c is better at 7.6.  Thyroid function tests are normal.  Uric acid elevated at 9.2.  CPK normal.    Assessment/Plan:     Diagnosis Plan   1. Type 2 diabetes mellitus without complication, without long-term current use of insulin     2. Hyperlipidemia, unspecified hyperlipidemia type     3. Hypothyroidism, unspecified type     4. Benign essential hypertension     5. Gout     6. Therapeutic drug monitoring         Patient has type 2 diabetes that is now under better control.  Not quite at goal but definitely much better.  Hyperlipidemia is under satisfactory control.  Thyroid is therapeutic.  Patient has history of gout but we took him off of allopurinol because he has not had an attack in " some time and patient requested to discontinue it.  However, his uric acid is significantly elevated and I think he should go back on it.    Plan is as follows: Restart allopurinol 300 mg per day.  No other medication changes.  Patient thinks he will have health insurance in about 3 months and once he does, I will have him schedule lab in follow-up.  In the meantime we will try to help him with samples of medications.          Procedures

## 2017-08-23 DIAGNOSIS — E11.9 TYPE 2 DIABETES MELLITUS WITHOUT COMPLICATION (HCC): ICD-10-CM

## 2017-08-24 RX ORDER — GLIMEPIRIDE 4 MG/1
TABLET ORAL
Qty: 30 TABLET | Refills: 2 | Status: SHIPPED | OUTPATIENT
Start: 2017-08-24 | End: 2017-11-14 | Stop reason: SDUPTHER

## 2017-08-29 DIAGNOSIS — I10 BENIGN ESSENTIAL HYPERTENSION: ICD-10-CM

## 2017-11-14 DIAGNOSIS — E11.9 TYPE 2 DIABETES MELLITUS WITHOUT COMPLICATION (HCC): ICD-10-CM

## 2017-11-14 RX ORDER — GLIMEPIRIDE 4 MG/1
TABLET ORAL
Qty: 30 TABLET | Refills: 3 | Status: SHIPPED | OUTPATIENT
Start: 2017-11-14 | End: 2018-04-03 | Stop reason: SDUPTHER

## 2017-12-10 DIAGNOSIS — E78.5 HYPERLIPIDEMIA: ICD-10-CM

## 2017-12-11 RX ORDER — ATORVASTATIN CALCIUM 80 MG/1
TABLET, FILM COATED ORAL
Qty: 30 TABLET | Refills: 1 | Status: SHIPPED | OUTPATIENT
Start: 2017-12-11 | End: 2017-12-12 | Stop reason: SDUPTHER

## 2017-12-12 DIAGNOSIS — E78.5 HYPERLIPIDEMIA, UNSPECIFIED HYPERLIPIDEMIA TYPE: ICD-10-CM

## 2017-12-12 RX ORDER — ATORVASTATIN CALCIUM 80 MG/1
80 TABLET, FILM COATED ORAL NIGHTLY
Qty: 90 TABLET | Refills: 1 | Status: SHIPPED | OUTPATIENT
Start: 2017-12-12 | End: 2018-09-05 | Stop reason: SDUPTHER

## 2017-12-14 DIAGNOSIS — I10 BENIGN ESSENTIAL HYPERTENSION: ICD-10-CM

## 2017-12-21 DIAGNOSIS — E03.9 HYPOTHYROIDISM, UNSPECIFIED TYPE: Chronic | ICD-10-CM

## 2017-12-21 RX ORDER — LEVOTHYROXINE SODIUM 0.05 MG/1
TABLET ORAL
Qty: 30 TABLET | Refills: 5 | Status: SHIPPED | OUTPATIENT
Start: 2017-12-21 | End: 2018-07-07 | Stop reason: SDUPTHER

## 2018-02-19 DIAGNOSIS — I10 BENIGN ESSENTIAL HYPERTENSION: ICD-10-CM

## 2018-03-07 DIAGNOSIS — E11.9 TYPE 2 DIABETES MELLITUS WITHOUT COMPLICATION, WITHOUT LONG-TERM CURRENT USE OF INSULIN (HCC): Chronic | ICD-10-CM

## 2018-03-13 DIAGNOSIS — E11.9 TYPE 2 DIABETES MELLITUS WITHOUT COMPLICATION, WITHOUT LONG-TERM CURRENT USE OF INSULIN (HCC): Chronic | ICD-10-CM

## 2018-03-30 DIAGNOSIS — I10 BENIGN ESSENTIAL HYPERTENSION: ICD-10-CM

## 2018-03-30 DIAGNOSIS — E11.9 TYPE 2 DIABETES MELLITUS WITHOUT COMPLICATION (HCC): ICD-10-CM

## 2018-04-02 RX ORDER — GLIMEPIRIDE 4 MG/1
TABLET ORAL
Qty: 30 TABLET | Refills: 2 | OUTPATIENT
Start: 2018-04-02

## 2018-04-03 DIAGNOSIS — E11.9 TYPE 2 DIABETES MELLITUS WITHOUT COMPLICATION (HCC): ICD-10-CM

## 2018-04-03 DIAGNOSIS — I10 BENIGN ESSENTIAL HYPERTENSION: ICD-10-CM

## 2018-04-03 RX ORDER — GLIMEPIRIDE 4 MG/1
TABLET ORAL
Qty: 30 TABLET | Refills: 2 | Status: SHIPPED | OUTPATIENT
Start: 2018-04-03 | End: 2018-07-07 | Stop reason: SDUPTHER

## 2018-04-17 DIAGNOSIS — E11.9 TYPE 2 DIABETES MELLITUS WITHOUT COMPLICATION, UNSPECIFIED LONG TERM INSULIN USE STATUS: ICD-10-CM

## 2018-04-18 DIAGNOSIS — E11.9 TYPE 2 DIABETES MELLITUS WITHOUT COMPLICATION, UNSPECIFIED LONG TERM INSULIN USE STATUS: ICD-10-CM

## 2018-07-07 DIAGNOSIS — E03.9 HYPOTHYROIDISM, UNSPECIFIED TYPE: Chronic | ICD-10-CM

## 2018-07-07 DIAGNOSIS — I10 BENIGN ESSENTIAL HYPERTENSION: ICD-10-CM

## 2018-07-07 DIAGNOSIS — E11.9 TYPE 2 DIABETES MELLITUS WITHOUT COMPLICATION (HCC): ICD-10-CM

## 2018-07-09 RX ORDER — LEVOTHYROXINE SODIUM 0.05 MG/1
50 TABLET ORAL DAILY
Qty: 30 TABLET | Refills: 0 | Status: SHIPPED | OUTPATIENT
Start: 2018-07-09 | End: 2019-04-01 | Stop reason: SDUPTHER

## 2018-07-09 RX ORDER — GLIMEPIRIDE 4 MG/1
4 TABLET ORAL
Qty: 30 TABLET | Refills: 0 | Status: SHIPPED | OUTPATIENT
Start: 2018-07-09 | End: 2018-09-28 | Stop reason: SDUPTHER

## 2018-08-18 ENCOUNTER — RESULTS ENCOUNTER (OUTPATIENT)
Dept: INTERNAL MEDICINE | Facility: CLINIC | Age: 63
End: 2018-08-18

## 2018-08-18 DIAGNOSIS — E11.9 TYPE 2 DIABETES MELLITUS WITHOUT COMPLICATION, WITHOUT LONG-TERM CURRENT USE OF INSULIN (HCC): Chronic | ICD-10-CM

## 2018-08-18 DIAGNOSIS — E03.9 HYPOTHYROIDISM, UNSPECIFIED TYPE: Chronic | ICD-10-CM

## 2018-08-18 DIAGNOSIS — D64.9 CHRONIC ANEMIA: Chronic | ICD-10-CM

## 2018-08-18 DIAGNOSIS — Z87.39 HISTORY OF GOUT: Chronic | ICD-10-CM

## 2018-08-18 DIAGNOSIS — E78.5 HYPERLIPIDEMIA, UNSPECIFIED HYPERLIPIDEMIA TYPE: Chronic | ICD-10-CM

## 2018-09-05 DIAGNOSIS — E78.5 HYPERLIPIDEMIA, UNSPECIFIED HYPERLIPIDEMIA TYPE: ICD-10-CM

## 2018-09-05 RX ORDER — ATORVASTATIN CALCIUM 80 MG/1
80 TABLET, FILM COATED ORAL NIGHTLY
Qty: 90 TABLET | Refills: 0 | Status: SHIPPED | OUTPATIENT
Start: 2018-09-05 | End: 2019-05-02 | Stop reason: SDUPTHER

## 2018-09-28 DIAGNOSIS — E11.9 TYPE 2 DIABETES MELLITUS WITHOUT COMPLICATION (HCC): ICD-10-CM

## 2018-09-28 DIAGNOSIS — E03.9 HYPOTHYROIDISM, UNSPECIFIED TYPE: Chronic | ICD-10-CM

## 2018-09-28 DIAGNOSIS — I10 BENIGN ESSENTIAL HYPERTENSION: ICD-10-CM

## 2018-09-28 DIAGNOSIS — Z87.39 HISTORY OF GOUT: Chronic | ICD-10-CM

## 2018-09-28 RX ORDER — LEVOTHYROXINE SODIUM 0.05 MG/1
TABLET ORAL
Qty: 30 TABLET | Refills: 4 | Status: SHIPPED | OUTPATIENT
Start: 2018-09-28 | End: 2019-02-28 | Stop reason: SDUPTHER

## 2018-09-28 RX ORDER — GLIMEPIRIDE 4 MG/1
TABLET ORAL
Qty: 30 TABLET | Refills: 4 | Status: SHIPPED | OUTPATIENT
Start: 2018-09-28 | End: 2019-02-28 | Stop reason: SDUPTHER

## 2018-09-28 RX ORDER — ALLOPURINOL 300 MG/1
TABLET ORAL
Qty: 30 TABLET | Refills: 4 | Status: SHIPPED | OUTPATIENT
Start: 2018-09-28 | End: 2019-02-28 | Stop reason: SDUPTHER

## 2019-02-28 DIAGNOSIS — E03.9 HYPOTHYROIDISM, UNSPECIFIED TYPE: Chronic | ICD-10-CM

## 2019-02-28 DIAGNOSIS — E11.9 TYPE 2 DIABETES MELLITUS WITHOUT COMPLICATION (HCC): ICD-10-CM

## 2019-02-28 DIAGNOSIS — Z87.39 HISTORY OF GOUT: Chronic | ICD-10-CM

## 2019-02-28 DIAGNOSIS — I10 BENIGN ESSENTIAL HYPERTENSION: ICD-10-CM

## 2019-02-28 RX ORDER — LEVOTHYROXINE SODIUM 0.05 MG/1
50 TABLET ORAL DAILY
Qty: 30 TABLET | Refills: 0 | Status: SHIPPED | OUTPATIENT
Start: 2019-02-28 | End: 2019-03-30 | Stop reason: SDUPTHER

## 2019-02-28 RX ORDER — GLIMEPIRIDE 4 MG/1
4 TABLET ORAL
Qty: 30 TABLET | Refills: 0 | Status: SHIPPED | OUTPATIENT
Start: 2019-02-28 | End: 2019-03-30 | Stop reason: SDUPTHER

## 2019-02-28 RX ORDER — ALLOPURINOL 300 MG/1
300 TABLET ORAL DAILY
Qty: 30 TABLET | Refills: 0 | Status: SHIPPED | OUTPATIENT
Start: 2019-02-28 | End: 2019-03-30 | Stop reason: SDUPTHER

## 2019-03-30 DIAGNOSIS — I10 BENIGN ESSENTIAL HYPERTENSION: ICD-10-CM

## 2019-03-30 DIAGNOSIS — Z87.39 HISTORY OF GOUT: Chronic | ICD-10-CM

## 2019-03-30 DIAGNOSIS — E03.9 HYPOTHYROIDISM, UNSPECIFIED TYPE: Chronic | ICD-10-CM

## 2019-03-30 DIAGNOSIS — E11.9 TYPE 2 DIABETES MELLITUS WITHOUT COMPLICATION (HCC): ICD-10-CM

## 2019-04-01 RX ORDER — ALLOPURINOL 300 MG/1
TABLET ORAL
Qty: 30 TABLET | Refills: 0 | Status: SHIPPED | OUTPATIENT
Start: 2019-04-01 | End: 2019-05-02 | Stop reason: SDUPTHER

## 2019-04-01 RX ORDER — LEVOTHYROXINE SODIUM 0.05 MG/1
TABLET ORAL
Qty: 30 TABLET | Refills: 0 | Status: SHIPPED | OUTPATIENT
Start: 2019-04-01 | End: 2019-05-02 | Stop reason: SDUPTHER

## 2019-04-01 RX ORDER — GLIMEPIRIDE 4 MG/1
TABLET ORAL
Qty: 30 TABLET | Refills: 0 | Status: SHIPPED | OUTPATIENT
Start: 2019-04-01 | End: 2019-05-02 | Stop reason: SDUPTHER

## 2019-04-01 RX ORDER — LEVOTHYROXINE SODIUM 0.05 MG/1
TABLET ORAL
Qty: 30 TABLET | Refills: 0 | Status: SHIPPED | OUTPATIENT
Start: 2019-04-01 | End: 2019-05-02

## 2019-04-03 DIAGNOSIS — Z87.39 HISTORY OF GOUT: Chronic | ICD-10-CM

## 2019-04-03 DIAGNOSIS — I10 BENIGN ESSENTIAL HYPERTENSION: ICD-10-CM

## 2019-04-03 DIAGNOSIS — E11.9 TYPE 2 DIABETES MELLITUS WITHOUT COMPLICATION (HCC): ICD-10-CM

## 2019-04-03 RX ORDER — ALLOPURINOL 300 MG/1
TABLET ORAL
Qty: 30 TABLET | Refills: 0 | OUTPATIENT
Start: 2019-04-03

## 2019-04-03 RX ORDER — GLIMEPIRIDE 4 MG/1
TABLET ORAL
Qty: 30 TABLET | Refills: 0 | OUTPATIENT
Start: 2019-04-03

## 2019-04-16 LAB
ALBUMIN SERPL-MCNC: 4.5 G/DL (ref 3.5–5.2)
ALBUMIN/CREAT UR: 9.5 MG/G CREAT (ref 0–30)
ALBUMIN/GLOB SERPL: 2 G/DL
ALP SERPL-CCNC: 88 U/L (ref 39–117)
ALT SERPL-CCNC: 16 U/L (ref 1–41)
AST SERPL-CCNC: 16 U/L (ref 1–40)
BILIRUB SERPL-MCNC: 0.5 MG/DL (ref 0.2–1.2)
BUN SERPL-MCNC: 11 MG/DL (ref 8–23)
BUN/CREAT SERPL: 11.8 (ref 7–25)
CALCIUM SERPL-MCNC: 9.9 MG/DL (ref 8.6–10.5)
CHLORIDE SERPL-SCNC: 101 MMOL/L (ref 98–107)
CHOLEST SERPL-MCNC: 128 MG/DL (ref 100–199)
CK SERPL-CCNC: 151 U/L (ref 20–200)
CO2 SERPL-SCNC: 25.7 MMOL/L (ref 22–29)
CREAT SERPL-MCNC: 0.93 MG/DL (ref 0.76–1.27)
CREAT UR-MCNC: 115.3 MG/DL
ERYTHROCYTE [DISTWIDTH] IN BLOOD BY AUTOMATED COUNT: 13.8 % (ref 12.3–15.4)
GLOBULIN SER CALC-MCNC: 2.2 GM/DL
GLUCOSE SERPL-MCNC: 135 MG/DL (ref 65–99)
HBA1C MFR BLD: 8 % (ref 4.8–5.6)
HCT VFR BLD AUTO: 42.9 % (ref 37.5–51)
HDL SERPL-SCNC: 28.9 UMOL/L
HDLC SERPL-MCNC: 40 MG/DL
HGB BLD-MCNC: 13.4 G/DL (ref 13–17.7)
LDL SERPL QN: 20.6 NM
LDL SERPL-SCNC: 633 NMOL/L
LDL SMALL SERPL-SCNC: 279 NMOL/L
LDLC SERPL CALC-MCNC: 46 MG/DL (ref 0–99)
MCH RBC QN AUTO: 28.9 PG (ref 26.6–33)
MCHC RBC AUTO-ENTMCNC: 31.2 G/DL (ref 31.5–35.7)
MCV RBC AUTO: 92.5 FL (ref 79–97)
MICROALBUMIN UR-MCNC: 11 UG/ML
PLATELET # BLD AUTO: 253 10*3/MM3 (ref 140–450)
POTASSIUM SERPL-SCNC: 4.5 MMOL/L (ref 3.5–5.2)
PROT SERPL-MCNC: 6.7 G/DL (ref 6–8.5)
RBC # BLD AUTO: 4.64 10*6/MM3 (ref 4.14–5.8)
SODIUM SERPL-SCNC: 139 MMOL/L (ref 136–145)
T3FREE SERPL-MCNC: 3.1 PG/ML (ref 2–4.4)
T4 FREE SERPL-MCNC: 1.14 NG/DL (ref 0.93–1.7)
TRIGL SERPL-MCNC: 212 MG/DL (ref 0–149)
TSH SERPL DL<=0.005 MIU/L-ACNC: 3.53 MIU/ML (ref 0.27–4.2)
URATE SERPL-MCNC: 6.1 MG/DL (ref 3.4–7)
WBC # BLD AUTO: 9.89 10*3/MM3 (ref 3.4–10.8)

## 2019-05-02 ENCOUNTER — OFFICE VISIT (OUTPATIENT)
Dept: INTERNAL MEDICINE | Facility: CLINIC | Age: 64
End: 2019-05-02

## 2019-05-02 VITALS
OXYGEN SATURATION: 97 % | SYSTOLIC BLOOD PRESSURE: 118 MMHG | DIASTOLIC BLOOD PRESSURE: 70 MMHG | WEIGHT: 298.6 LBS | HEART RATE: 73 BPM | BODY MASS INDEX: 37.13 KG/M2 | HEIGHT: 75 IN

## 2019-05-02 DIAGNOSIS — Z71.6 ENCOUNTER FOR SMOKING CESSATION COUNSELING: ICD-10-CM

## 2019-05-02 DIAGNOSIS — K52.9 CHRONIC DIARRHEA: Chronic | ICD-10-CM

## 2019-05-02 DIAGNOSIS — N32.89 IRRITABLE BLADDER: Chronic | ICD-10-CM

## 2019-05-02 DIAGNOSIS — E11.9 TYPE 2 DIABETES MELLITUS WITHOUT COMPLICATION, WITHOUT LONG-TERM CURRENT USE OF INSULIN (HCC): Chronic | ICD-10-CM

## 2019-05-02 DIAGNOSIS — R35.1 NOCTURIA: Chronic | ICD-10-CM

## 2019-05-02 DIAGNOSIS — E55.9 VITAMIN D DEFICIENCY: Chronic | ICD-10-CM

## 2019-05-02 DIAGNOSIS — E03.9 HYPOTHYROIDISM, UNSPECIFIED TYPE: Chronic | ICD-10-CM

## 2019-05-02 DIAGNOSIS — F17.210 NICOTINE DEPENDENCE, CIGARETTES, UNCOMPLICATED: ICD-10-CM

## 2019-05-02 DIAGNOSIS — Z51.81 THERAPEUTIC DRUG MONITORING: ICD-10-CM

## 2019-05-02 DIAGNOSIS — N40.1 BENIGN NON-NODULAR PROSTATIC HYPERPLASIA WITH LOWER URINARY TRACT SYMPTOMS: Chronic | ICD-10-CM

## 2019-05-02 DIAGNOSIS — Z87.39 HISTORY OF GOUT: Chronic | ICD-10-CM

## 2019-05-02 DIAGNOSIS — E78.5 HYPERLIPIDEMIA, UNSPECIFIED HYPERLIPIDEMIA TYPE: Chronic | ICD-10-CM

## 2019-05-02 DIAGNOSIS — D64.9 CHRONIC ANEMIA: Chronic | ICD-10-CM

## 2019-05-02 DIAGNOSIS — E11.9 TYPE 2 DIABETES MELLITUS WITHOUT COMPLICATION (HCC): ICD-10-CM

## 2019-05-02 DIAGNOSIS — E11.9 ENCOUNTER FOR DIABETIC FOOT EXAM (HCC): Chronic | ICD-10-CM

## 2019-05-02 DIAGNOSIS — Z90.49 HISTORY OF PARTIAL COLECTOMY: Chronic | ICD-10-CM

## 2019-05-02 DIAGNOSIS — Z91.199 MEDICAL NON-COMPLIANCE: ICD-10-CM

## 2019-05-02 DIAGNOSIS — Z00.00 ROUTINE PHYSICAL EXAMINATION: Primary | ICD-10-CM

## 2019-05-02 DIAGNOSIS — I10 BENIGN ESSENTIAL HYPERTENSION: Chronic | ICD-10-CM

## 2019-05-02 PROCEDURE — 99396 PREV VISIT EST AGE 40-64: CPT | Performed by: INTERNAL MEDICINE

## 2019-05-02 RX ORDER — LEVOTHYROXINE SODIUM 0.05 MG/1
TABLET ORAL
Qty: 30 TABLET | Refills: 5 | Status: SHIPPED | OUTPATIENT
Start: 2019-05-02 | End: 2019-11-09 | Stop reason: SDUPTHER

## 2019-05-02 RX ORDER — ATORVASTATIN CALCIUM 80 MG/1
TABLET, FILM COATED ORAL
Qty: 90 TABLET | Refills: 1 | Status: SHIPPED | OUTPATIENT
Start: 2019-05-02 | End: 2019-12-09 | Stop reason: SDUPTHER

## 2019-05-02 RX ORDER — ALLOPURINOL 300 MG/1
TABLET ORAL
Qty: 30 TABLET | Refills: 5 | Status: SHIPPED | OUTPATIENT
Start: 2019-05-02 | End: 2019-11-09 | Stop reason: SDUPTHER

## 2019-05-02 RX ORDER — GLIMEPIRIDE 4 MG/1
TABLET ORAL
Qty: 30 TABLET | Refills: 5 | Status: SHIPPED | OUTPATIENT
Start: 2019-05-02 | End: 2019-05-13 | Stop reason: SDUPTHER

## 2019-05-02 NOTE — PROGRESS NOTES
05/02/2019    Patient Information  Navarro Bruno                                                                                          [id number] [facility] [id number]      1955  [unfilled]  There is no work phone number on file.    Chief Complaint:     Routine physical examination and follow-up lab work.  Patient complaining of cost of medication.    History of Present Illness:    Patient with a history of multiple medical problems including chronic anemia, hypertension, hyperlipidemia, hypothyroidism, irritable bladder and nocturia, type 2 diabetes, vitamin D deficiency, chronic diarrhea, history of partial colectomy, gout, BPH.  He presents today after a long absence due to the fact that he lost his insurance.  However, patient has been compliant with his medications here recently.  He presents today for his routine annual physical exam and follow-up lab work.  He has no new acute complaints except for the cost of Victoza and Janumet.  His past medical history reviewed and updated were necessary including health maintenance parameters.  This reveals he will be up-to-date or else accounted for after today's visit.    Review of Systems   Constitution: Negative.   HENT: Negative.    Eyes: Negative.    Cardiovascular: Negative.    Respiratory: Negative.    Endocrine: Negative.    Hematologic/Lymphatic: Negative.    Skin: Negative.    Musculoskeletal: Negative.    Gastrointestinal: Negative.    Genitourinary: Negative.    Neurological: Negative.    Psychiatric/Behavioral: Negative.    Allergic/Immunologic: Negative.        Active Problems:    Patient Active Problem List   Diagnosis   • Chronic anemia   • Benign essential hypertension   • Hyperlipidemia   • Hypothyroidism   • Irritable bladder   • Nocturia   • Type 2 diabetes mellitus (CMS/HCC)   • Vitamin D deficiency   • Therapeutic drug monitoring   • Routine physical examination   • Chronic diarrhea   • History of partial  colectomy, 12/17/2014--ileo-cecal colectomy with primary reanastomosis.  Obstruction secondary to NSAID-induced inflamed ileum.   • Gout   • Benign prostatic hypertrophy   • Diabetic eye exam (CMS/[facility])   • Diabetic foot exam   • Encounter for smoking cessation counseling   • Nicotine dependence, cigarettes, uncomplicated   • Medical non-compliance         Past Medical History:   Diagnosis Date   • Benign essential hypertension 4/19/2007 04/19/2007--treatment for hypertension begun.   • Benign prostatic hypertrophy 7/25/2016 07/25/2016--patient reports irritable bladder symptoms have resolved and he stopped using the Myrbetriq.  05/11/2015--patient presents with a one-month history of urinary urgency, occasional urge incontinence without dysuria. Patient has nocturia 2-3 times per night. Myrbetric 50 mg, one half by mouth daily initiated.   • Chronic anemia 2/4/2015 07/19/2016--hemoglobin slightly low at 13.3.  Hematocrit normal at 42.0.  05/11/2015--patient seen in follow-up and had a recent colonoscopy which was negative. Serum iron studies were normal. Homocystine and methylmalonic acid were normal. No further workup. Recent CBC reveals a slightly low hemoglobin but a normal hematocrit.   02/04/2015--routine CBC reveals a low hemoglobin of 11.5, hematocrit   • Chronic diarrhea 4/18/2016 07/25/2016--patient seen in follow-up and reports his diarrhea has improved but not resolved.  He reports that he WelChol did not help but he only took a maximum of 2 pills per day.  04/18/2016--patient with a history of partial colectomy performed in 2014 performed for an inflamed terminal ileum.  Part of the terminal ileum in the cecum removed.  Since that time patient presents with chronic diar   • Diabetic eye exam (CMS/[facility]) 2/3/2017    02/03/2017--patient reports he has not had a diabetic eye examination.  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  Order given.   • Diabetic foot exam  2/3/2017    02/03/2017--diabetic foot examination reveals no ulcers and no pre-ulcerative calluses.  He was left great toe and second toe have been ambulated from a lawnmower accident as a teenager.  Sensation is intact.   • Gout 7/15/2010    07/15/2010--patient presented with complaints of left foot pain. Initial diagnosis of gout. Uric acid 8.3. Treatment begun with allopurinol.   • History of echocardiogram 12/14/2014 12/14/2014--echocardiogram reveals left ventricular chamber size is normal. Mild concentric left ventricular hypertrophy. Normal left ventricular systolic function with ejection fraction of 58%. Abnormal left ventricular diastolic function is observed. Left Atrium normal. Right ventricular cavity size is mildly enlarged. The right ventricular global systolic function is normal. Right atrium is nor   • History of Hepatitis, infectious Age 18    18 years of age--patient had infectious hepatitis. Exact type unknown.   • History of partial colectomy, 12/17/2014--ileo-cecal colectomy with primary reanastomosis. 12/17/2014 12/17/2014--patient presented with a small bowel obstruction secondary to inflamed terminal ileum. He underwent ileo-cecal colectomy with primary reanastomosis.   • History of small bowel obstruction 12/15/2014    01/19/2015--patient seen in follow-up and is doing well. No change in current medical regimen at the present time. Set up fasting lab and follow up. I explained to patient that we will need to monitor him for the development of vitamin B12 deficiency. He will certainly be at risk for this.   12/17/2014--patient presented with a small bowel obstruction secondary to inflamed terminal ileum. He under   • Hyperlipidemia 6/13/2007 06/13/2007--treatment for hyperlipidemia begun.   • Hypothyroidism 5/8/2015 05/19/2017--TSH elevated at 5.33.  Free T4 and free T3 are normal.  It appears the patient truly has hypothyroidism.  Levothyroxine 50 µg per day initiated.   01/27/2016--repeat thyroid function tests normal.  11/11/2015--TSH elevated at 5.27. Free T4 low normal at 0.92. TPO antibodies less than 6. Repeat thyroid function tests ordered.  05/08/2015--TSH mildly elevated at 4.5. Free T4 low normal at   • Irritable bladder 5/11/2016 02/03/2017--patient reports that he is irritable bladder symptoms have returned but they seem to be intermittent.  He describes urgency and frequency.  However, he was diabetes is not under optimal control and could be playing a role.  His A1c is less than 8 at the present time.  In the past Myrbetriq seemed to be helpful and I will give patient samples to have at the time that he needs it.  He is   • Nocturia 3/29/2016    02/03/2017--patient seen in follow-up and continues to have nocturia 2-3 times per night.  He has irritable bladder symptoms consisting of urgency and occasional incontinence has returned as well.  Myrbetriq samples given and patient can use that as needed.  07/25/2016--patient reports irritable bladder symptoms have resolved and he stopped using the Myrbetriq.  05/11/2015--patient presents with a   • Type 2 diabetes mellitus (CMS/[facility]) 3/9/2007    03/09/2007--patient presented with polyuria and polydipsia. Initial diagnosis of diabetes. Serum glucose 252, initial hemoglobin A1c 10.4.   • Vitamin D deficiency 4/12/2016         Past Surgical History:   Procedure Laterality Date   • AMPUTATION FOOT / TOE  18 years old    18 years of age--left great toe and left second toe amputation from a lawnmower accident.   • APPENDECTOMY  11 years old    11 years of age.   • CATARACT EXTRACTION Bilateral 2013 2013--patient reports he had bilateral cataract surgery about 4 years ago.  Intraocular lenses placed.   • COLECTOMY PARTIAL / TOTAL  12/17/2014 12/17/2014--patient presented with a small bowel obstruction secondary to inflamed terminal ileum. He underwent ileo-cecal colectomy with primary reanastomosis.   • COLONOSCOPY   04/06/2015 04/06/2015--colonoscopy revealed small internal hemorrhoids. Well healed ileocolic anastomosis. Fair prep.   • EXPLORATORY LAPAROTOMY  12/17/2014 12/17/2014--patient presented with a small bowel obstruction secondary to inflamed terminal ileum. He underwent ileo-cecal colectomy with primary reanastomosis.         No Known Allergies        Current Outpatient Medications:   •  allopurinol (ZYLOPRIM) 300 MG tablet, TAKE 1 TABLET BY MOUTH ONE TIME A DAY. *patient needs appointment , Disp: 30 tablet, Rfl: 0  •  atorvastatin (LIPITOR) 80 MG tablet, TAKE 1 TABLET BY MOUTH every night  , Disp: 90 tablet, Rfl: 0  •  B-D ULTRAFINE III SHORT PEN 31G X 8 MM misc, Use with Victoza injections daily., Disp: , Rfl: 3  •  Cholecalciferol (VITAMIN D3) 5000 UNITS capsule capsule, 1 by mouth daily as directed, Disp: 30 capsule, Rfl: 11  •  glimepiride (AMARYL) 4 MG tablet, TAKE 1 TABLET BY MOUTH IN THE MORNING before breakfast. *patient needs appointment , Disp: 30 tablet, Rfl: 0  •  levothyroxine (SYNTHROID, LEVOTHROID) 50 MCG tablet, TAKE 1 TABLET BY MOUTH ONE TIME A DAY , Disp: 30 tablet, Rfl: 0  •  Liraglutide (VICTOZA) 18 MG/3ML solution pen-injector injection, Inject 1.8 mg subcutaneously daily as directed., Disp: 3 pen, Rfl: 5  •  metoprolol tartrate (LOPRESSOR) 25 MG tablet, TAKE 1 TABLET BY MOUTH TWO TIMES A DAY. *patient needs appointment , Disp: 60 tablet, Rfl: 0  •  SITagliptin-MetFORMIN HCl ER (JANUMET XR)  MG tablet, 1 by mouth twice a day with food, Disp: 60 tablet, Rfl: 11      Family History   Problem Relation Age of Onset   • Scoliosis Mother         Amyotrophic Lateral Sclerosis   • Stroke Father         Father had multiple strokes.   • Diabetes Father         Type 2   • Diabetes Paternal Grandfather         Type 2         Social History     Socioeconomic History   • Marital status:      Spouse name: Not on file   • Number of children: Not on file   • Years of education: 14   • Highest  "education level: Not on file   Occupational History   • Occupation:  for [employer]   Social Needs   • Financial resource strain: Not hard at all   • Food insecurity:     Worry: Never true     Inability: Never true   • Transportation needs:     Medical: No     Non-medical: No   Tobacco Use   • Smoking status: Current Every Day Smoker     Packs/day: 1.00     Types: Cigarettes   • Smokeless tobacco: Never Used   Substance and Sexual Activity   • Alcohol use: Yes     Frequency: Monthly or less     Drinks per session: 1 or 2     Binge frequency: Never     Comment: Minimum consumption   • Drug use: No   • Sexual activity: Yes     Partners: Female   Lifestyle   • Physical activity:     Days per week: 0 days     Minutes per session: 0 min   • Stress: Not at all   Relationships   • Social connections:     Talks on phone: Patient refused     Gets together: Patient refused     Attends [employer] service: Patient refused     Active member of club or organization: Patient refused     Attends meetings of clubs or organizations: Patient refused     Relationship status: Patient refused         Vitals:    05/02/19 0724   BP: 118/70   BP Location: Right arm   Pulse: 73   SpO2: 97%   Weight: 135 kg (298 lb 9.6 oz)   Height: 190.5 cm (75\")          Physical Exam:    General: Alert and oriented x 3, with appropriate affect; no acute distress.  Obese.  HEENT: pupils equal, round, and reactive to light; extraocular movements intact; sclera nonicteric; nasal mucosa normal; pharynx normal; tympanic membranes and ear canals normal.  Neck: without JVD, thyromegaly, bruit, or adenopathy.  Lungs: clear to auscultation in all fields.  Heart: auscultation reveals regular rate and rhythm without murmur, rub, gallop, or click.  Abdomen: is soft and nontender, without hepato-splenomegaly, mass or hernia. Normal bowel sounds; .  Urologic exam: reveals normal male genitalia without testicular mass or penile/scrotal lesion.  Digital " rectal exam and Prostate: deferred.  Extremities: are without clubbing, cyanosis, or edema.  Vascular: no signs of peripheral arterial disease or venous insufficiency/varicosities.  Neurological: intact without focal deficit, including cranial and peripheral nerves.  Station and gait observed to be normal during ingress and egress from the examination area.  Sensation and deep tendon reflexes tested if clinically indicated and are normal.  Musculoskeletal: exam is normal, without signs of synovitis, significant degeneration or deformity, except as noted under diabetic foot exam below. Skin examination: without rash or significant lesions.    May 2, 2019--routine diabetic foot exam reveals no evidence of diabetic foot ulcer or pre-ulcerative callus.  Left great and second toes amputated from a lawnmower accident as a teenager.  Pulses intact.  Sensation intact.      Lab/other results:    NMR reveals a total cholesterol of 128.  Triglycerides elevated slightly at 212.  LDL particle number excellent at 633.  Small LDL particle number excellent 279.  HDL particle number low at 28.9.  CMP normal except blood sugar elevated at 135.  CBC is normal.  Albumin/creatinine ratio normal at 9.5.  Hemoglobin A1c 8.0.  Thyroid function tests are normal.  Uric acid normal at 6.1.  CPK normal.    Assessment/Plan:     Diagnosis Plan   1. Routine physical examination     2. Type 2 diabetes mellitus without complication, without long-term current use of insulin (CMS/[facility])     3. Hyperlipidemia, unspecified hyperlipidemia type     4. Benign essential hypertension     5. Hypothyroidism, unspecified type     6. Chronic anemia     7. Irritable bladder     8. Nocturia     9. Chronic diarrhea     10. History of partial colectomy, 12/17/2014--ileo-cecal colectomy with primary reanastomosis.  Obstruction secondary to NSAID-induced inflamed ileum.     11. Vitamin D deficiency     12. Gout     13. Benign prostatic hypertrophy     14. Diabetic foot  exam     15. Therapeutic drug monitoring     16. Encounter for smoking cessation counseling     17. Nicotine dependence, cigarettes, uncomplicated     18. Medical non-compliance       Patient presents with essentially normal annual physical except for the following issues: Patient has type 2 diabetes that is under borderline control.  Hyperlipidemia is under adequate control.  Blood pressure seems to be reasonably controlled.  His thyroid is therapeutic.  Chronic anemia has resolved.  Irritable bladder symptoms are intermittent.  Chronic diarrhea is still an issue but tolerable.  Patient is up-to-date on his colonoscopy.  Vitamin D therapeutic.  Gout seems stable on allopurinol.  Diabetic foot exam today is essentially unremarkable.  There is no sign of diabetic foot ulcers.  Patient continues to smoke cigarettes and we had a discussion about that as noted below.    Several preventative health issues discussed including review of vaccinations and recommendations, including dietary issues, exercise and weight loss.  Safe sex practices discussed.  Patient advised to wear seatbelt whenever driving and avoid texting and driving.  Also advised to look both ways before crossing the street.  Colon cancer prevention discussed and is up-to-date with colonoscopy.  Advised to avoid tobacco products and minimize alcohol consumption.    May 2, 2019--smoking cessation encounter.  Advised the patient of the risks of continued use tobacco, particularly given the fact that he has multiple risk factors including diabetes.  I provided this patient with smoking cessation educational materials and discussed how to quit including possibility of using medication.  Patient has expressed that he understands he needs to quit but would like to avoid medication at this time due to financial reasons.  I recommended nicotine patches.  These certainly do not cost any more than cigarettes.  Approximately 5 minutes spent during this encounter with  smoking cessation.     Plan is as follows: No change in current medical regimen.  Medications refilled.  Patient will follow-up in 6 months with lab prior or follow-up as needed.  Compliance and need for follow-up discussed.    Procedures

## 2019-05-13 DIAGNOSIS — E11.9 TYPE 2 DIABETES MELLITUS WITHOUT COMPLICATION (HCC): ICD-10-CM

## 2019-05-13 RX ORDER — GLIMEPIRIDE 4 MG/1
TABLET ORAL
Qty: 30 TABLET | Refills: 5 | Status: SHIPPED | OUTPATIENT
Start: 2019-05-13 | End: 2020-05-18

## 2019-05-14 DIAGNOSIS — E11.9 TYPE 2 DIABETES MELLITUS WITHOUT COMPLICATION (HCC): ICD-10-CM

## 2019-11-01 DIAGNOSIS — D64.9 CHRONIC ANEMIA: Primary | Chronic | ICD-10-CM

## 2019-11-01 DIAGNOSIS — E11.9 TYPE 2 DIABETES MELLITUS WITHOUT COMPLICATION, WITHOUT LONG-TERM CURRENT USE OF INSULIN (HCC): Chronic | ICD-10-CM

## 2019-11-01 DIAGNOSIS — E78.5 HYPERLIPIDEMIA, UNSPECIFIED HYPERLIPIDEMIA TYPE: Chronic | ICD-10-CM

## 2019-11-01 DIAGNOSIS — Z87.39 HISTORY OF GOUT: Chronic | ICD-10-CM

## 2019-11-01 DIAGNOSIS — E03.9 HYPOTHYROIDISM, UNSPECIFIED TYPE: Chronic | ICD-10-CM

## 2019-11-02 LAB
ALBUMIN SERPL-MCNC: 4.4 G/DL (ref 3.5–5.2)
ALBUMIN/CREAT UR: 15.1 MG/G CREAT (ref 0–30)
ALBUMIN/GLOB SERPL: 2 G/DL
ALP SERPL-CCNC: 93 U/L (ref 39–117)
ALT SERPL-CCNC: 12 U/L (ref 1–41)
AST SERPL-CCNC: 13 U/L (ref 1–40)
BILIRUB SERPL-MCNC: 0.3 MG/DL (ref 0.2–1.2)
BUN SERPL-MCNC: 15 MG/DL (ref 8–23)
BUN/CREAT SERPL: 15.6 (ref 7–25)
CALCIUM SERPL-MCNC: 9.4 MG/DL (ref 8.6–10.5)
CHLORIDE SERPL-SCNC: 100 MMOL/L (ref 98–107)
CHOLEST SERPL-MCNC: 134 MG/DL (ref 100–199)
CK SERPL-CCNC: 61 U/L (ref 20–200)
CO2 SERPL-SCNC: 25.8 MMOL/L (ref 22–29)
CREAT SERPL-MCNC: 0.96 MG/DL (ref 0.76–1.27)
CREAT UR-MCNC: 178.6 MG/DL
ERYTHROCYTE [DISTWIDTH] IN BLOOD BY AUTOMATED COUNT: 14.2 % (ref 12.3–15.4)
GLOBULIN SER CALC-MCNC: 2.2 GM/DL
GLUCOSE SERPL-MCNC: 160 MG/DL (ref 65–99)
HBA1C MFR BLD: 7.5 % (ref 4.8–5.6)
HCT VFR BLD AUTO: 42.3 % (ref 37.5–51)
HDL SERPL-SCNC: 32.8 UMOL/L
HDLC SERPL-MCNC: 45 MG/DL
HGB BLD-MCNC: 13.7 G/DL (ref 13–17.7)
LDL SERPL QN: 20.5 NM
LDL SERPL-SCNC: 518 NMOL/L
LDL SMALL SERPL-SCNC: 233 NMOL/L
LDLC SERPL CALC-MCNC: 50 MG/DL (ref 0–99)
MCH RBC QN AUTO: 29.1 PG (ref 26.6–33)
MCHC RBC AUTO-ENTMCNC: 32.4 G/DL (ref 31.5–35.7)
MCV RBC AUTO: 89.8 FL (ref 79–97)
MICROALBUMIN UR-MCNC: 26.9 UG/ML
PLATELET # BLD AUTO: 258 10*3/MM3 (ref 140–450)
POTASSIUM SERPL-SCNC: 4.3 MMOL/L (ref 3.5–5.2)
PROT SERPL-MCNC: 6.6 G/DL (ref 6–8.5)
RBC # BLD AUTO: 4.71 10*6/MM3 (ref 4.14–5.8)
SODIUM SERPL-SCNC: 140 MMOL/L (ref 136–145)
T3FREE SERPL-MCNC: 2.7 PG/ML (ref 2–4.4)
T4 FREE SERPL-MCNC: 1.04 NG/DL (ref 0.93–1.7)
TRIGL SERPL-MCNC: 193 MG/DL (ref 0–149)
TSH SERPL DL<=0.005 MIU/L-ACNC: 3.76 UIU/ML (ref 0.27–4.2)
URATE SERPL-MCNC: 4.7 MG/DL (ref 3.4–7)
WBC # BLD AUTO: 10.47 10*3/MM3 (ref 3.4–10.8)

## 2019-11-09 DIAGNOSIS — Z87.39 HISTORY OF GOUT: Chronic | ICD-10-CM

## 2019-11-09 DIAGNOSIS — I10 BENIGN ESSENTIAL HYPERTENSION: Chronic | ICD-10-CM

## 2019-11-09 DIAGNOSIS — E03.9 HYPOTHYROIDISM, UNSPECIFIED TYPE: Chronic | ICD-10-CM

## 2019-11-11 RX ORDER — LEVOTHYROXINE SODIUM 0.05 MG/1
TABLET ORAL
Qty: 30 TABLET | Refills: 4 | Status: SHIPPED | OUTPATIENT
Start: 2019-11-11 | End: 2020-04-20

## 2019-11-11 RX ORDER — ALLOPURINOL 300 MG/1
TABLET ORAL
Qty: 30 TABLET | Refills: 4 | Status: SHIPPED | OUTPATIENT
Start: 2019-11-11 | End: 2020-04-20

## 2019-11-12 ENCOUNTER — OFFICE VISIT (OUTPATIENT)
Dept: INTERNAL MEDICINE | Facility: CLINIC | Age: 64
End: 2019-11-12

## 2019-11-12 VITALS
DIASTOLIC BLOOD PRESSURE: 78 MMHG | HEART RATE: 83 BPM | SYSTOLIC BLOOD PRESSURE: 132 MMHG | HEIGHT: 75 IN | BODY MASS INDEX: 36.06 KG/M2 | WEIGHT: 290 LBS | OXYGEN SATURATION: 96 %

## 2019-11-12 DIAGNOSIS — Z00.00 ROUTINE PHYSICAL EXAMINATION: ICD-10-CM

## 2019-11-12 DIAGNOSIS — E03.9 HYPOTHYROIDISM, UNSPECIFIED TYPE: Chronic | ICD-10-CM

## 2019-11-12 DIAGNOSIS — Z71.6 ENCOUNTER FOR SMOKING CESSATION COUNSELING: ICD-10-CM

## 2019-11-12 DIAGNOSIS — K52.9 CHRONIC DIARRHEA: Chronic | ICD-10-CM

## 2019-11-12 DIAGNOSIS — E11.9 TYPE 2 DIABETES MELLITUS WITHOUT COMPLICATION, WITHOUT LONG-TERM CURRENT USE OF INSULIN (HCC): Primary | Chronic | ICD-10-CM

## 2019-11-12 DIAGNOSIS — F17.210 NICOTINE DEPENDENCE, CIGARETTES, UNCOMPLICATED: ICD-10-CM

## 2019-11-12 DIAGNOSIS — N32.89 IRRITABLE BLADDER: Chronic | ICD-10-CM

## 2019-11-12 DIAGNOSIS — Z23 NEED FOR PNEUMOCOCCAL VACCINATION: ICD-10-CM

## 2019-11-12 DIAGNOSIS — Z87.39 HISTORY OF GOUT: Chronic | ICD-10-CM

## 2019-11-12 DIAGNOSIS — Z51.81 THERAPEUTIC DRUG MONITORING: ICD-10-CM

## 2019-11-12 DIAGNOSIS — E55.9 VITAMIN D DEFICIENCY: Chronic | ICD-10-CM

## 2019-11-12 DIAGNOSIS — N40.1 BENIGN NON-NODULAR PROSTATIC HYPERPLASIA WITH LOWER URINARY TRACT SYMPTOMS: Chronic | ICD-10-CM

## 2019-11-12 DIAGNOSIS — I10 BENIGN ESSENTIAL HYPERTENSION: Chronic | ICD-10-CM

## 2019-11-12 DIAGNOSIS — D64.9 CHRONIC ANEMIA: Chronic | ICD-10-CM

## 2019-11-12 DIAGNOSIS — E78.5 HYPERLIPIDEMIA, UNSPECIFIED HYPERLIPIDEMIA TYPE: Chronic | ICD-10-CM

## 2019-11-12 DIAGNOSIS — Z90.49 HISTORY OF PARTIAL COLECTOMY: Chronic | ICD-10-CM

## 2019-11-12 PROBLEM — Z91.199 MEDICAL NON-COMPLIANCE: Chronic | Status: ACTIVE | Noted: 2019-05-02

## 2019-11-12 PROCEDURE — 99406 BEHAV CHNG SMOKING 3-10 MIN: CPT | Performed by: INTERNAL MEDICINE

## 2019-11-12 PROCEDURE — 99214 OFFICE O/P EST MOD 30 MIN: CPT | Performed by: INTERNAL MEDICINE

## 2019-11-12 PROCEDURE — 90471 IMMUNIZATION ADMIN: CPT | Performed by: INTERNAL MEDICINE

## 2019-11-12 PROCEDURE — 90670 PCV13 VACCINE IM: CPT | Performed by: INTERNAL MEDICINE

## 2019-11-12 NOTE — PROGRESS NOTES
11/12/2019    Patient Information  Navarro Bruno                                                                                          [address] [place] [address]      1955  [unfilled]  There is no work phone number on file.    Chief Complaint:     Follow-up type 2 diabetes, hyperlipidemia, hypertension, hypothyroidism, chronic anemia, irritable bladder, chronic diarrhea, history of partial colectomy, gout, BPH, vitamin D deficiency, smoking cessation.    History of Present Illness:    Patient with history of multiple medical problems as outlined in the chief complaint presents today for follow-up with lab prior in order to monitor his chronic medical issues.  His past medical history reviewed and updated were necessary including health maintenance parameters.  This reveals he will be up-to-date or else accounted for after today's visit.    Review of Systems   Constitution: Negative.   HENT: Negative.    Eyes: Negative.    Cardiovascular: Negative.    Respiratory: Negative.    Endocrine: Negative.    Hematologic/Lymphatic: Negative.    Skin: Negative.    Musculoskeletal: Negative.    Gastrointestinal: Negative.    Genitourinary: Positive for nocturia.   Neurological: Negative.    Psychiatric/Behavioral: Negative.    Allergic/Immunologic: Negative.        Active Problems:    Patient Active Problem List   Diagnosis   • Chronic anemia   • Benign essential hypertension   • Hyperlipidemia   • Hypothyroidism   • Irritable bladder   • Nocturia   • Type 2 diabetes mellitus (CMS/HCC)   • Vitamin D deficiency   • Therapeutic drug monitoring   • Routine physical examination   • Chronic diarrhea   • History of partial colectomy, 12/17/2014--ileo-cecal colectomy with primary reanastomosis.  Obstruction secondary to NSAID-induced inflamed ileum.   • Gout   • Benign prostatic hypertrophy   • Diabetic eye exam (CMS/HCC)   • Diabetic foot exam   • Encounter for smoking cessation counseling   •  Nicotine dependence, cigarettes, uncomplicated   • Medical non-compliance         Past Medical History:   Diagnosis Date   • Benign essential hypertension 4/19/2007 04/19/2007--treatment for hypertension begun.   • Benign prostatic hypertrophy 7/25/2016 07/25/2016--patient reports irritable bladder symptoms have resolved and he stopped using the Myrbetriq.  05/11/2015--patient presents with a one-month history of urinary urgency, occasional urge incontinence without dysuria. Patient has nocturia 2-3 times per night. Myrbetric 50 mg, one half by mouth daily initiated.   • Chronic anemia 2/4/2015 07/19/2016--hemoglobin slightly low at 13.3.  Hematocrit normal at 42.0.  05/11/2015--patient seen in follow-up and had a recent colonoscopy which was negative. Serum iron studies were normal. Homocystine and methylmalonic acid were normal. No further workup. Recent CBC reveals a slightly low hemoglobin but a normal hematocrit.   02/04/2015--routine CBC reveals a low hemoglobin of 11.5, hematocrit   • Chronic diarrhea 4/18/2016 07/25/2016--patient seen in follow-up and reports his diarrhea has improved but not resolved.  He reports that he WelChol did not help but he only took a maximum of 2 pills per day.  04/18/2016--patient with a history of partial colectomy performed in 2014 performed for an inflamed terminal ileum.  Part of the terminal ileum in the cecum removed.  Since that time patient presents with chronic diar   • Diabetic eye exam (CMS/[facility]) 2/3/2017    02/03/2017--patient reports he has not had a diabetic eye examination.  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  Order given.   • Diabetic foot exam 2/3/2017    02/03/2017--diabetic foot examination reveals no ulcers and no pre-ulcerative calluses.  He was left great toe and second toe have been ambulated from a lawnmower accident as a teenager.  Sensation is intact.   • Gout 7/15/2010    07/15/2010--patient presented with  complaints of left foot pain. Initial diagnosis of gout. Uric acid 8.3. Treatment begun with allopurinol.   • History of echocardiogram 12/14/2014 12/14/2014--echocardiogram reveals left ventricular chamber size is normal. Mild concentric left ventricular hypertrophy. Normal left ventricular systolic function with ejection fraction of 58%. Abnormal left ventricular diastolic function is observed. Left Atrium normal. Right ventricular cavity size is mildly enlarged. The right ventricular global systolic function is normal. Right atrium is nor   • History of Hepatitis, infectious Age 18    18 years of age--patient had infectious hepatitis. Exact type unknown.   • History of partial colectomy, 12/17/2014--ileo-cecal colectomy with primary reanastomosis. 12/17/2014 12/17/2014--patient presented with a small bowel obstruction secondary to inflamed terminal ileum. He underwent ileo-cecal colectomy with primary reanastomosis.   • History of small bowel obstruction 12/15/2014    01/19/2015--patient seen in follow-up and is doing well. No change in current medical regimen at the present time. Set up fasting lab and follow up. I explained to patient that we will need to monitor him for the development of vitamin B12 deficiency. He will certainly be at risk for this.   12/17/2014--patient presented with a small bowel obstruction secondary to inflamed terminal ileum. He under   • Hyperlipidemia 6/13/2007 06/13/2007--treatment for hyperlipidemia begun.   • Hypothyroidism 5/8/2015 05/19/2017--TSH elevated at 5.33.  Free T4 and free T3 are normal.  It appears the patient truly has hypothyroidism.  Levothyroxine 50 µg per day initiated.  01/27/2016--repeat thyroid function tests normal.  11/11/2015--TSH elevated at 5.27. Free T4 low normal at 0.92. TPO antibodies less than 6. Repeat thyroid function tests ordered.  05/08/2015--TSH mildly elevated at 4.5. Free T4 low normal at   • Irritable bladder 5/11/2016     02/03/2017--patient reports that he is irritable bladder symptoms have returned but they seem to be intermittent.  He describes urgency and frequency.  However, he was diabetes is not under optimal control and could be playing a role.  His A1c is less than 8 at the present time.  In the past Myrbetriq seemed to be helpful and I will give patient samples to have at the time that he needs it.  He is   • Medical non-compliance 5/2/2019   • Nocturia 3/29/2016    02/03/2017--patient seen in follow-up and continues to have nocturia 2-3 times per night.  He has irritable bladder symptoms consisting of urgency and occasional incontinence has returned as well.  Myrbetriq samples given and patient can use that as needed.  07/25/2016--patient reports irritable bladder symptoms have resolved and he stopped using the Myrbetriq.  05/11/2015--patient presents with a   • Type 2 diabetes mellitus (CMS/[facility]) 3/9/2007    03/09/2007--patient presented with polyuria and polydipsia. Initial diagnosis of diabetes. Serum glucose 252, initial hemoglobin A1c 10.4.   • Vitamin D deficiency 4/12/2016         Past Surgical History:   Procedure Laterality Date   • AMPUTATION FOOT / TOE  18 years old    18 years of age--left great toe and left second toe amputation from a lawnmower accident.   • APPENDECTOMY  11 years old    11 years of age.   • CATARACT EXTRACTION Bilateral 2013 2013--patient reports he had bilateral cataract surgery about 4 years ago.  Intraocular lenses placed.   • COLECTOMY PARTIAL / TOTAL  12/17/2014 12/17/2014--patient presented with a small bowel obstruction secondary to inflamed terminal ileum. He underwent ileo-cecal colectomy with primary reanastomosis.   • COLONOSCOPY  04/06/2015 04/06/2015--colonoscopy revealed small internal hemorrhoids. Well healed ileocolic anastomosis. Fair prep.   • EXPLORATORY LAPAROTOMY  12/17/2014 12/17/2014--patient presented with a small bowel obstruction secondary to inflamed  terminal ileum. He underwent ileo-cecal colectomy with primary reanastomosis.         No Known Allergies        Current Outpatient Medications:   •  allopurinol (ZYLOPRIM) 300 MG tablet, TAKE 1 TABLET BY MOUTH ONE TIME A DAY FOR GOUT., Disp: 30 tablet, Rfl: 4  •  atorvastatin (LIPITOR) 80 MG tablet, Take 1 p.o. daily for high cholesterol, Disp: 90 tablet, Rfl: 1  •  B-D ULTRAFINE III SHORT PEN 31G X 8 MM misc, Use with Victoza injections daily., Disp: , Rfl: 3  •  Cholecalciferol (VITAMIN D3) 5000 UNITS capsule capsule, 1 by mouth daily as directed, Disp: 30 capsule, Rfl: 11  •  glimepiride (AMARYL) 4 MG tablet, Take 1 p.o. daily with breakfast for diabetes, Disp: 30 tablet, Rfl: 5  •  levothyroxine (SYNTHROID, LEVOTHROID) 50 MCG tablet, TAKE 1 TABLET BY MOUTH ONE TIME A DAY FOR LOW THYROID, Disp: 30 tablet, Rfl: 4  •  Liraglutide (VICTOZA) 18 MG/3ML solution pen-injector injection, Inject 1.8 mg subcutaneously daily as directed., Disp: 3 pen, Rfl: 5  •  metoprolol tartrate (LOPRESSOR) 25 MG tablet, TAKE 1 TABLET BY MOUTH TWO TIMES A DAY FOR HIGH BLOOD PRESSURE., Disp: 60 tablet, Rfl: 4  •  SITagliptin-metFORMIN HCl ER (JANUMET XR)  MG tablet, Take 2 p.o. daily for diabetes, Disp: 60 tablet, Rfl: 5      Family History   Problem Relation Age of Onset   • Scoliosis Mother         Amyotrophic Lateral Sclerosis   • Stroke Father         Father had multiple strokes.   • Diabetes Father         Type 2   • Diabetes Paternal Grandfather         Type 2         Social History     Socioeconomic History   • Marital status:      Spouse name: Not on file   • Number of children: Not on file   • Years of education: 14   • Highest education level: Not on file   Occupational History   • Occupation:  for [employer]   Social Needs   • Financial resource strain: Not hard at all   • Food insecurity:     Worry: Never true     Inability: Never true   • Transportation needs:     Medical: No      "Non-medical: No   Tobacco Use   • Smoking status: Current Every Day Smoker     Packs/day: 1.00     Types: Cigarettes   • Smokeless tobacco: Never Used   Substance and Sexual Activity   • Alcohol use: Yes     Frequency: Monthly or less     Drinks per session: 1 or 2     Binge frequency: Never     Comment: Minimum consumption   • Drug use: No   • Sexual activity: Yes     Partners: Female   Lifestyle   • Physical activity:     Days per week: 0 days     Minutes per session: 0 min   • Stress: Not at all   Relationships   • Social connections:     Talks on phone: Patient refused     Gets together: Patient refused     Attends Buddhism service: Patient refused     Active member of club or organization: Patient refused     Attends meetings of clubs or organizations: Patient refused     Relationship status: Patient refused         Vitals:    11/12/19 0710   BP: 152/90   BP Location: Left arm   Pulse: 83   SpO2: 96%   Weight: 132 kg (290 lb)   Height: 190.5 cm (75\")          Physical Exam:    General: Alert and oriented x 3.  No acute distress.  Normal affect.  Obese.  HEENT: Pupils equal, round, reactive to light; extraocular movements intact; sclerae nonicteric; pharynx, ear canals and TMs normal.  Neck: Without JVD, thyromegaly, bruit, or adenopathy.  Lungs: Clear to auscultation in all fields.  Heart: Regular rate and rhythm without murmur, rub, gallop, or click.  Abdomen: Soft, nontender, without hepatosplenomegaly or hernia.  Bowel sounds normal.  : Deferred.  Rectal: Deferred.  Extremities: Without clubbing, cyanosis, edema, or pulse deficit.  Neurologic: Intact without focal deficit.  Normal station and gait observed during ingress and egress from the examination room.  Skin: Without significant lesion.  Musculoskeletal: Unremarkable.    Lab/other results:    NMR reveals a total cholesterol 134.  Triglycerides 193.  LDL particle number excellent 518.  Small LDL particle number excellent at 233.  HDL particle number " normal at 32.8.  CMP normal except blood sugar elevated 160, CBC normal.  Albumin/creatinine ratio normal.  Hemoglobin A1c 7.5.  Thyroid function tests are normal.  Uric acid normal.  CPK normal.    Assessment/Plan:     Diagnosis Plan   1. Type 2 diabetes mellitus without complication, without long-term current use of insulin (CMS/[facility])     2. Hyperlipidemia, unspecified hyperlipidemia type     3. Benign essential hypertension     4. Hypothyroidism, unspecified type     5. Chronic anemia     6. Irritable bladder     7. Chronic diarrhea     8. History of partial colectomy, 12/17/2014--ileo-cecal colectomy with primary reanastomosis.  Obstruction secondary to NSAID-induced inflamed ileum.     9. Gout     10. Benign prostatic hypertrophy     11. Vitamin D deficiency     12. Therapeutic drug monitoring     13. Encounter for smoking cessation counseling     14. Nicotine dependence, cigarettes, uncomplicated       Patient has type 2 diabetes is under much better control.  Compliance seems to be better currently.  His cholesterol is under excellent control.  His blood pressure is also under good control.  Thyroid is therapeutic.  Chronic anemia appears to resolved but we will monitor.  Patient has irritable bladder and nocturia which is still somewhat of a problem but patient feels as not bad enough to place on medication.  Chronic diarrhea is somewhat tolerable.  Probiotic seems to be helping.  Gout has been stable.  BPH is relatively asymptomatic.  Vitamin D therapeutic.    November 12, 2019--smoking cessation encounter.  Advised the patient of the risks of continued use tobacco, particularly given the fact that he has multiple risk factors including diabetes.  I provided this patient with smoking cessation educational materials and discussed how to quit including possibility of using medication.  Patient has expressed that he understands he needs to quit but would like to avoid medication at this time due to financial  reasons.  I recommended nicotine patches.  These certainly do not cost any more than cigarettes.  Approximately 5 minutes spent during this encounter with smoking cessation.     Plan is as follows: Prevnar 13 given.  No changes in current medical regimen.  Patient will follow-up in 6 months with lab prior for his annual physical or follow-up as needed.        Procedures

## 2019-12-09 DIAGNOSIS — E78.5 HYPERLIPIDEMIA, UNSPECIFIED HYPERLIPIDEMIA TYPE: Chronic | ICD-10-CM

## 2019-12-09 RX ORDER — ATORVASTATIN CALCIUM 80 MG/1
TABLET, FILM COATED ORAL
Qty: 90 TABLET | Refills: 0 | Status: SHIPPED | OUTPATIENT
Start: 2019-12-09 | End: 2020-01-13

## 2020-01-07 ENCOUNTER — TELEPHONE (OUTPATIENT)
Dept: INTERNAL MEDICINE | Facility: CLINIC | Age: 65
End: 2020-01-07

## 2020-01-07 NOTE — TELEPHONE ENCOUNTER
Called pt to let him know his victoza is ready to .  Called home number in chart and it just rang, there is no voicemail set up

## 2020-01-08 ENCOUNTER — TELEPHONE (OUTPATIENT)
Dept: INTERNAL MEDICINE | Facility: CLINIC | Age: 65
End: 2020-01-08

## 2020-01-08 NOTE — TELEPHONE ENCOUNTER
Called pt to let him know his Victoza is ready to be picked up, pt didn't answer and has no voicemail set up

## 2020-01-11 DIAGNOSIS — E78.5 HYPERLIPIDEMIA, UNSPECIFIED HYPERLIPIDEMIA TYPE: Chronic | ICD-10-CM

## 2020-01-13 RX ORDER — ATORVASTATIN CALCIUM 80 MG/1
TABLET, FILM COATED ORAL
Qty: 90 TABLET | Refills: 0 | Status: SHIPPED | OUTPATIENT
Start: 2020-01-13 | End: 2020-01-15

## 2020-01-15 DIAGNOSIS — E78.5 HYPERLIPIDEMIA, UNSPECIFIED HYPERLIPIDEMIA TYPE: Chronic | ICD-10-CM

## 2020-01-15 RX ORDER — ATORVASTATIN CALCIUM 80 MG/1
TABLET, FILM COATED ORAL
Qty: 90 TABLET | Refills: 0 | Status: SHIPPED | OUTPATIENT
Start: 2020-01-15 | End: 2021-02-03

## 2020-04-18 DIAGNOSIS — E03.9 HYPOTHYROIDISM, UNSPECIFIED TYPE: Chronic | ICD-10-CM

## 2020-04-18 DIAGNOSIS — Z87.39 HISTORY OF GOUT: Chronic | ICD-10-CM

## 2020-04-18 DIAGNOSIS — I10 BENIGN ESSENTIAL HYPERTENSION: Chronic | ICD-10-CM

## 2020-04-20 RX ORDER — ALLOPURINOL 300 MG/1
TABLET ORAL
Qty: 30 TABLET | Refills: 0 | Status: SHIPPED | OUTPATIENT
Start: 2020-04-20 | End: 2020-05-18

## 2020-04-20 RX ORDER — LEVOTHYROXINE SODIUM 0.05 MG/1
TABLET ORAL
Qty: 30 TABLET | Refills: 0 | Status: SHIPPED | OUTPATIENT
Start: 2020-04-20 | End: 2020-05-18

## 2020-04-30 ENCOUNTER — TELEPHONE (OUTPATIENT)
Dept: INTERNAL MEDICINE | Facility: CLINIC | Age: 65
End: 2020-04-30

## 2020-04-30 NOTE — TELEPHONE ENCOUNTER
Patient notified that we received his supply of Victoza (quanity 4 boxes) from [email or web address].  Patient expressed understanding and will pick this up tomorrow.

## 2020-05-06 DIAGNOSIS — E03.9 HYPOTHYROIDISM, UNSPECIFIED TYPE: Chronic | ICD-10-CM

## 2020-05-06 DIAGNOSIS — Z87.39 HISTORY OF GOUT: Chronic | ICD-10-CM

## 2020-05-06 DIAGNOSIS — Z00.00 ROUTINE PHYSICAL EXAMINATION: ICD-10-CM

## 2020-05-06 DIAGNOSIS — E11.9 TYPE 2 DIABETES MELLITUS WITHOUT COMPLICATION, WITHOUT LONG-TERM CURRENT USE OF INSULIN (HCC): Chronic | ICD-10-CM

## 2020-05-06 DIAGNOSIS — E55.9 VITAMIN D DEFICIENCY: Chronic | ICD-10-CM

## 2020-05-06 DIAGNOSIS — E78.5 HYPERLIPIDEMIA, UNSPECIFIED HYPERLIPIDEMIA TYPE: Chronic | ICD-10-CM

## 2020-05-18 DIAGNOSIS — Z87.39 HISTORY OF GOUT: Chronic | ICD-10-CM

## 2020-05-18 DIAGNOSIS — E11.9 TYPE 2 DIABETES MELLITUS WITHOUT COMPLICATION (HCC): ICD-10-CM

## 2020-05-18 DIAGNOSIS — I10 BENIGN ESSENTIAL HYPERTENSION: Chronic | ICD-10-CM

## 2020-05-18 DIAGNOSIS — E03.9 HYPOTHYROIDISM, UNSPECIFIED TYPE: Chronic | ICD-10-CM

## 2020-05-18 RX ORDER — ALLOPURINOL 300 MG/1
TABLET ORAL
Qty: 30 TABLET | Refills: 0 | Status: SHIPPED | OUTPATIENT
Start: 2020-05-18 | End: 2020-06-22

## 2020-05-18 RX ORDER — LEVOTHYROXINE SODIUM 0.05 MG/1
TABLET ORAL
Qty: 30 TABLET | Refills: 0 | Status: SHIPPED | OUTPATIENT
Start: 2020-05-18 | End: 2020-06-22

## 2020-05-18 RX ORDER — GLIMEPIRIDE 4 MG/1
TABLET ORAL
Qty: 30 TABLET | Refills: 0 | Status: SHIPPED | OUTPATIENT
Start: 2020-05-18 | End: 2020-06-22

## 2020-05-27 ENCOUNTER — TELEPHONE (OUTPATIENT)
Dept: INTERNAL MEDICINE | Facility: CLINIC | Age: 65
End: 2020-05-27

## 2020-05-27 NOTE — TELEPHONE ENCOUNTER
Put pt's forms in the mail today to [facility] for help with pt's janumet

## 2020-06-02 ENCOUNTER — LAB (OUTPATIENT)
Dept: LAB | Facility: HOSPITAL | Age: 65
End: 2020-06-02

## 2020-06-02 LAB
25(OH)D3 SERPL-MCNC: 33.5 NG/ML (ref 30–100)
ALBUMIN SERPL-MCNC: 4.3 G/DL (ref 3.5–5.2)
ALBUMIN UR-MCNC: 1.8 MG/DL
ALBUMIN/GLOB SERPL: 1.5 G/DL
ALP SERPL-CCNC: 83 U/L (ref 39–117)
ALT SERPL W P-5'-P-CCNC: 12 U/L (ref 1–41)
ANION GAP SERPL CALCULATED.3IONS-SCNC: 10.9 MMOL/L (ref 5–15)
AST SERPL-CCNC: 12 U/L (ref 1–40)
BILIRUB SERPL-MCNC: 0.3 MG/DL (ref 0.2–1.2)
BUN BLD-MCNC: 17 MG/DL (ref 8–23)
BUN/CREAT SERPL: 19.1 (ref 7–25)
CALCIUM SPEC-SCNC: 10.1 MG/DL (ref 8.6–10.5)
CHLORIDE SERPL-SCNC: 98 MMOL/L (ref 98–107)
CK SERPL-CCNC: 70 U/L (ref 20–200)
CO2 SERPL-SCNC: 26.1 MMOL/L (ref 22–29)
CREAT BLD-MCNC: 0.89 MG/DL (ref 0.76–1.27)
CREAT UR-MCNC: 148.5 MG/DL
DEPRECATED RDW RBC AUTO: 45.8 FL (ref 37–54)
ERYTHROCYTE [DISTWIDTH] IN BLOOD BY AUTOMATED COUNT: 14.3 % (ref 12.3–15.4)
GFR SERPL CREATININE-BSD FRML MDRD: 86 ML/MIN/1.73
GLOBULIN UR ELPH-MCNC: 2.8 GM/DL
GLUCOSE BLD-MCNC: 150 MG/DL (ref 65–99)
HBA1C MFR BLD: 7.6 % (ref 4.8–5.6)
HCT VFR BLD AUTO: 43.4 % (ref 37.5–51)
HGB BLD-MCNC: 14.3 G/DL (ref 13–17.7)
MCH RBC QN AUTO: 28.6 PG (ref 26.6–33)
MCHC RBC AUTO-ENTMCNC: 32.9 G/DL (ref 31.5–35.7)
MCV RBC AUTO: 86.8 FL (ref 79–97)
MICROALBUMIN/CREAT UR: 12.1 MG/G
PLATELET # BLD AUTO: 286 10*3/MM3 (ref 140–450)
PMV BLD AUTO: 9.9 FL (ref 6–12)
POTASSIUM BLD-SCNC: 5.1 MMOL/L (ref 3.5–5.2)
PROT SERPL-MCNC: 7.1 G/DL (ref 6–8.5)
PSA SERPL-MCNC: 1.48 NG/ML (ref 0–4)
RBC # BLD AUTO: 5 10*6/MM3 (ref 4.14–5.8)
SODIUM BLD-SCNC: 135 MMOL/L (ref 136–145)
T3FREE SERPL-MCNC: 3.02 PG/ML (ref 2–4.4)
T4 FREE SERPL-MCNC: 1.2 NG/DL (ref 0.93–1.7)
TSH SERPL DL<=0.05 MIU/L-ACNC: 2.82 UIU/ML (ref 0.27–4.2)
URATE SERPL-MCNC: 4.2 MG/DL (ref 3.4–7)
WBC NRBC COR # BLD: 14.93 10*3/MM3 (ref 3.4–10.8)

## 2020-06-02 PROCEDURE — 84153 ASSAY OF PSA TOTAL: CPT | Performed by: INTERNAL MEDICINE

## 2020-06-02 PROCEDURE — 85027 COMPLETE CBC AUTOMATED: CPT | Performed by: INTERNAL MEDICINE

## 2020-06-02 PROCEDURE — 82570 ASSAY OF URINE CREATININE: CPT | Performed by: INTERNAL MEDICINE

## 2020-06-02 PROCEDURE — 82306 VITAMIN D 25 HYDROXY: CPT | Performed by: INTERNAL MEDICINE

## 2020-06-02 PROCEDURE — 84443 ASSAY THYROID STIM HORMONE: CPT | Performed by: INTERNAL MEDICINE

## 2020-06-02 PROCEDURE — 84550 ASSAY OF BLOOD/URIC ACID: CPT | Performed by: INTERNAL MEDICINE

## 2020-06-02 PROCEDURE — 83036 HEMOGLOBIN GLYCOSYLATED A1C: CPT | Performed by: INTERNAL MEDICINE

## 2020-06-02 PROCEDURE — 84481 FREE ASSAY (FT-3): CPT | Performed by: INTERNAL MEDICINE

## 2020-06-02 PROCEDURE — 80053 COMPREHEN METABOLIC PANEL: CPT | Performed by: INTERNAL MEDICINE

## 2020-06-02 PROCEDURE — 83704 LIPOPROTEIN BLD QUAN PART: CPT | Performed by: INTERNAL MEDICINE

## 2020-06-02 PROCEDURE — 84439 ASSAY OF FREE THYROXINE: CPT | Performed by: INTERNAL MEDICINE

## 2020-06-02 PROCEDURE — 36415 COLL VENOUS BLD VENIPUNCTURE: CPT

## 2020-06-02 PROCEDURE — 80061 LIPID PANEL: CPT | Performed by: INTERNAL MEDICINE

## 2020-06-02 PROCEDURE — 82043 UR ALBUMIN QUANTITATIVE: CPT | Performed by: INTERNAL MEDICINE

## 2020-06-02 PROCEDURE — 82550 ASSAY OF CK (CPK): CPT | Performed by: INTERNAL MEDICINE

## 2020-06-04 LAB
CHOLEST SERPL-MCNC: 115 MG/DL (ref 100–199)
HDL SERPL-SCNC: 28.4 UMOL/L
HDLC SERPL-MCNC: 42 MG/DL
LDL-P: 654 NMOL/L
LDLC REAL SIZE PAT SERPL: 20.3 NM
LDLC SERPL CALC-MCNC: 40 MG/DL (ref 0–99)
SMALL LDL-P: 300 NMOL/L
TRIGL SERPL-MCNC: 165 MG/DL (ref 0–149)

## 2020-06-09 ENCOUNTER — OFFICE VISIT (OUTPATIENT)
Dept: INTERNAL MEDICINE | Facility: CLINIC | Age: 65
End: 2020-06-09

## 2020-06-09 VITALS
OXYGEN SATURATION: 97 % | DIASTOLIC BLOOD PRESSURE: 80 MMHG | WEIGHT: 265 LBS | SYSTOLIC BLOOD PRESSURE: 128 MMHG | HEART RATE: 80 BPM | BODY MASS INDEX: 32.95 KG/M2 | HEIGHT: 75 IN

## 2020-06-09 DIAGNOSIS — D64.9 CHRONIC ANEMIA: Chronic | ICD-10-CM

## 2020-06-09 DIAGNOSIS — E03.9 PRIMARY HYPOTHYROIDISM: Chronic | ICD-10-CM

## 2020-06-09 DIAGNOSIS — Z00.00 ROUTINE PHYSICAL EXAMINATION: Primary | ICD-10-CM

## 2020-06-09 DIAGNOSIS — Z23 NEED FOR PNEUMOCOCCAL VACCINATION: ICD-10-CM

## 2020-06-09 DIAGNOSIS — E55.9 VITAMIN D DEFICIENCY: Chronic | ICD-10-CM

## 2020-06-09 DIAGNOSIS — I10 BENIGN ESSENTIAL HYPERTENSION: Chronic | ICD-10-CM

## 2020-06-09 DIAGNOSIS — E11.9 TYPE 2 DIABETES MELLITUS WITHOUT COMPLICATION, WITHOUT LONG-TERM CURRENT USE OF INSULIN (HCC): Chronic | ICD-10-CM

## 2020-06-09 DIAGNOSIS — E11.9 ENCOUNTER FOR DIABETIC FOOT EXAM (HCC): Chronic | ICD-10-CM

## 2020-06-09 DIAGNOSIS — N40.1 BENIGN NON-NODULAR PROSTATIC HYPERPLASIA WITH LOWER URINARY TRACT SYMPTOMS: Chronic | ICD-10-CM

## 2020-06-09 DIAGNOSIS — E78.2 MIXED HYPERLIPIDEMIA: Chronic | ICD-10-CM

## 2020-06-09 DIAGNOSIS — Z90.49 HISTORY OF PARTIAL COLECTOMY: Chronic | ICD-10-CM

## 2020-06-09 DIAGNOSIS — Z87.39 HISTORY OF GOUT: Chronic | ICD-10-CM

## 2020-06-09 DIAGNOSIS — Z51.81 THERAPEUTIC DRUG MONITORING: ICD-10-CM

## 2020-06-09 PROCEDURE — 90471 IMMUNIZATION ADMIN: CPT | Performed by: INTERNAL MEDICINE

## 2020-06-09 PROCEDURE — 90732 PPSV23 VACC 2 YRS+ SUBQ/IM: CPT | Performed by: INTERNAL MEDICINE

## 2020-06-09 PROCEDURE — 99397 PER PM REEVAL EST PAT 65+ YR: CPT | Performed by: INTERNAL MEDICINE

## 2020-06-09 NOTE — PROGRESS NOTES
06/09/2020    Patient Information  Navarro Bruno                                                                                          [address] [facility] [id number]      1955  [unfilled]  There is no work phone number on file.    Chief Complaint:     Routine annual physical examination and follow-up lab work in order to monitor chronic medical issues listed in history of present illness.  No new acute complaints.    History of Present Illness:    Patient with a history of type 2 diabetes, hyperlipidemia, hypertension, chronic anemia, primary hypothyroidism, vitamin D deficiency, history of partial colectomy, gout, BPH presents today for his routine annual physical and follow-up lab work.  His past medical history reviewed and updated were necessary including health maintenance parameters.  This reveals he needs his final pneumococcal vaccination and he also needs a diabetic eye exam which I encouraged him to get.    Review of Systems   Constitution: Negative.   HENT: Negative.    Eyes: Negative.    Cardiovascular: Negative.    Respiratory: Negative.    Endocrine: Negative.    Hematologic/Lymphatic: Negative.    Skin: Negative.    Musculoskeletal: Negative.    Gastrointestinal: Negative.    Genitourinary: Negative.    Neurological: Negative.    Psychiatric/Behavioral: Negative.    Allergic/Immunologic: Negative.        Active Problems:    Patient Active Problem List   Diagnosis   • Chronic anemia   • Benign essential hypertension   • Hyperlipidemia   • Primary hypothyroidism   • Irritable bladder   • Nocturia   • Type 2 diabetes mellitus without complication, without long-term current use of insulin (CMS/[facility])   • Vitamin D deficiency   • Therapeutic drug monitoring   • Routine physical examination   • Chronic diarrhea   • History of partial colectomy, 12/17/2014--ileo-cecal colectomy with primary reanastomosis.  Obstruction secondary to NSAID-induced inflamed ileum.   • Gout   •  Benign prostatic hypertrophy   • Diabetic eye exam (CMS/[facility])   • Diabetic foot exam   • Encounter for smoking cessation counseling   • Nicotine dependence, cigarettes, uncomplicated   • Medical non-compliance         Past Medical History:   Diagnosis Date   • Benign essential hypertension 4/19/2007 04/19/2007--treatment for hypertension begun.   • Benign prostatic hypertrophy 7/25/2016 07/25/2016--patient reports irritable bladder symptoms have resolved and he stopped using the Myrbetriq.  05/11/2015--patient presents with a one-month history of urinary urgency, occasional urge incontinence without dysuria. Patient has nocturia 2-3 times per night. Myrbetric 50 mg, one half by mouth daily initiated.   • Chronic anemia 2/4/2015 07/19/2016--hemoglobin slightly low at 13.3.  Hematocrit normal at 42.0.  05/11/2015--patient seen in follow-up and had a recent colonoscopy which was negative. Serum iron studies were normal. Homocystine and methylmalonic acid were normal. No further workup. Recent CBC reveals a slightly low hemoglobin but a normal hematocrit.   02/04/2015--routine CBC reveals a low hemoglobin of 11.5, hematocrit   • Chronic diarrhea 4/18/2016 07/25/2016--patient seen in follow-up and reports his diarrhea has improved but not resolved.  He reports that he WelChol did not help but he only took a maximum of 2 pills per day.  04/18/2016--patient with a history of partial colectomy performed in 2014 performed for an inflamed terminal ileum.  Part of the terminal ileum in the cecum removed.  Since that time patient presents with chronic diar   • Diabetic eye exam (CMS/[facility]) 2/3/2017    02/03/2017--patient reports he has not had a diabetic eye examination.  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  Order given.   • Diabetic foot exam 2/3/2017    02/03/2017--diabetic foot examination reveals no ulcers and no pre-ulcerative calluses.  He was left great toe and second toe have  been ambulated from a lawnmower accident as a teenager.  Sensation is intact.   • Gout 7/15/2010    07/15/2010--patient presented with complaints of left foot pain. Initial diagnosis of gout. Uric acid 8.3. Treatment begun with allopurinol.   • History of echocardiogram 12/14/2014 12/14/2014--echocardiogram reveals left ventricular chamber size is normal. Mild concentric left ventricular hypertrophy. Normal left ventricular systolic function with ejection fraction of 58%. Abnormal left ventricular diastolic function is observed. Left Atrium normal. Right ventricular cavity size is mildly enlarged. The right ventricular global systolic function is normal. Right atrium is nor   • History of Hepatitis, infectious Age 18    18 years of age--patient had infectious hepatitis. Exact type unknown.   • History of partial colectomy, 12/17/2014--ileo-cecal colectomy with primary reanastomosis. 12/17/2014 12/17/2014--patient presented with a small bowel obstruction secondary to inflamed terminal ileum. He underwent ileo-cecal colectomy with primary reanastomosis.   • History of small bowel obstruction 12/15/2014    01/19/2015--patient seen in follow-up and is doing well. No change in current medical regimen at the present time. Set up fasting lab and follow up. I explained to patient that we will need to monitor him for the development of vitamin B12 deficiency. He will certainly be at risk for this.   12/17/2014--patient presented with a small bowel obstruction secondary to inflamed terminal ileum. He under   • Hyperlipidemia 6/13/2007 06/13/2007--treatment for hyperlipidemia begun.   • Hypothyroidism 5/8/2015 05/19/2017--TSH elevated at 5.33.  Free T4 and free T3 are normal.  It appears the patient truly has hypothyroidism.  Levothyroxine 50 µg per day initiated.  01/27/2016--repeat thyroid function tests normal.  11/11/2015--TSH elevated at 5.27. Free T4 low normal at 0.92. TPO antibodies less than 6. Repeat  thyroid function tests ordered.  05/08/2015--TSH mildly elevated at 4.5. Free T4 low normal at   • Irritable bladder 5/11/2016 02/03/2017--patient reports that he is irritable bladder symptoms have returned but they seem to be intermittent.  He describes urgency and frequency.  However, he was diabetes is not under optimal control and could be playing a role.  His A1c is less than 8 at the present time.  In the past Myrbetriq seemed to be helpful and I will give patient samples to have at the time that he needs it.  He is   • Medical non-compliance 5/2/2019   • Nocturia 3/29/2016    02/03/2017--patient seen in follow-up and continues to have nocturia 2-3 times per night.  He has irritable bladder symptoms consisting of urgency and occasional incontinence has returned as well.  Myrbetriq samples given and patient can use that as needed.  07/25/2016--patient reports irritable bladder symptoms have resolved and he stopped using the Myrbetriq.  05/11/2015--patient presents with a   • Type 2 diabetes mellitus (CMS/[facility]) 3/9/2007    03/09/2007--patient presented with polyuria and polydipsia. Initial diagnosis of diabetes. Serum glucose 252, initial hemoglobin A1c 10.4.   • Vitamin D deficiency 4/12/2016         Past Surgical History:   Procedure Laterality Date   • AMPUTATION FOOT / TOE  18 years old    18 years of age--left great toe and left second toe amputation from a lawnmower accident.   • APPENDECTOMY  11 years old    11 years of age.   • CATARACT EXTRACTION Bilateral 2013 2013--patient reports he had bilateral cataract surgery about 4 years ago.  Intraocular lenses placed.   • COLECTOMY PARTIAL / TOTAL  12/17/2014 12/17/2014--patient presented with a small bowel obstruction secondary to inflamed terminal ileum. He underwent ileo-cecal colectomy with primary reanastomosis.   • COLONOSCOPY  04/06/2015 04/06/2015--colonoscopy revealed small internal hemorrhoids. Well healed ileocolic anastomosis. Fair  prep.   • EXPLORATORY LAPAROTOMY  12/17/2014 12/17/2014--patient presented with a small bowel obstruction secondary to inflamed terminal ileum. He underwent ileo-cecal colectomy with primary reanastomosis.         No Known Allergies        Current Outpatient Medications:   •  allopurinol (ZYLOPRIM) 300 MG tablet, TAKE 1 TABLET BY MOUTH ONE TIME A DAY for gout , Disp: 30 tablet, Rfl: 0  •  atorvastatin (LIPITOR) 80 MG tablet, TAKE 1 TABLET BY MOUTH ONE TIME A DAY for cholesterol , Disp: 90 tablet, Rfl: 0  •  B-D ULTRAFINE III SHORT PEN 31G X 8 MM misc, Use with Victoza injections daily., Disp: , Rfl: 3  •  Cholecalciferol (VITAMIN D3) 5000 UNITS capsule capsule, 1 by mouth daily as directed, Disp: 30 capsule, Rfl: 11  •  glimepiride (AMARYL) 4 MG tablet, TAKE 1 TABLET BY MOUTH ONE TIME A DAY with breakfast for diabetes, Disp: 30 tablet, Rfl: 0  •  levothyroxine (SYNTHROID, LEVOTHROID) 50 MCG tablet, TAKE 1 TABLET BY MOUTH ONE TIME A DAY for low thyroid , Disp: 30 tablet, Rfl: 0  •  Liraglutide (VICTOZA) 18 MG/3ML solution pen-injector injection, Inject 1.8 mg subcutaneously daily as directed., Disp: 3 pen, Rfl: 5  •  metoprolol tartrate (LOPRESSOR) 25 MG tablet, TAKE 1 TABLET BY MOUTH TWO TIMES A DAY for high blood pressure , Disp: 60 tablet, Rfl: 0  •  SITagliptin-metFORMIN HCl ER (JANUMET XR)  MG tablet, Take 2 p.o. daily for diabetes, Disp: 60 tablet, Rfl: 5      Family History   Problem Relation Age of Onset   • Scoliosis Mother         Amyotrophic Lateral Sclerosis   • Stroke Father         Father had multiple strokes.   • Diabetes Father         Type 2   • Diabetes Paternal Grandfather         Type 2         Social History     Socioeconomic History   • Marital status:      Spouse name: Not on file   • Number of children: Not on file   • Years of education: 14   • Highest education level: Not on file   Occupational History   • Occupation:  for [employer]   Social Needs   •  "Financial resource strain: Not hard at all   • Food insecurity:     Worry: Never true     Inability: Never true   • Transportation needs:     Medical: No     Non-medical: No   Tobacco Use   • Smoking status: Current Every Day Smoker     Packs/day: 1.00     Types: Cigarettes   • Smokeless tobacco: Never Used   Substance and Sexual Activity   • Alcohol use: Yes     Frequency: Monthly or less     Drinks per session: 1 or 2     Binge frequency: Never     Comment: Minimum consumption   • Drug use: No   • Sexual activity: Yes     Partners: Female   Lifestyle   • Physical activity:     Days per week: 0 days     Minutes per session: 0 min   • Stress: Not at all   Relationships   • Social connections:     Talks on phone: Patient refused     Gets together: Patient refused     Attends [employer] service: Patient refused     Active member of club or organization: Patient refused     Attends meetings of clubs or organizations: Patient refused     Relationship status: Patient refused         Vitals:    06/09/20 0735   BP: 128/80   BP Location: Right arm   Pulse: 80   SpO2: 97%   Weight: 120 kg (265 lb)   Height: 190.5 cm (75\")        Body mass index is 33.12 kg/m².      Physical Exam:    General: Alert and oriented x 3.  No acute distress.  Normal affect.  Obese.  HEENT: Pupils equal, round, reactive to light; extraocular movements intact; sclerae nonicteric; pharynx, ear canals and TMs normal.  Neck: Without JVD, thyromegaly, bruit, or adenopathy.  Lungs: Clear to auscultation in all fields.  Heart: Regular rate and rhythm without murmur, rub, gallop, or click.  Abdomen: Soft, nontender, without hepatosplenomegaly or hernia.  Bowel sounds normal.  : Deferred.  Rectal: Deferred.  Extremities: Without clubbing, cyanosis, edema, or pulse deficit.  Neurologic: Intact without focal deficit.  Normal station and gait observed during ingress and egress from the examination room.  Skin: Without significant lesion.  Musculoskeletal: " Unremarkable.    June 9, 2020--routine diabetic foot exam reveals no diabetic foot ulcer or pre-ulcerative callus.  Amputation of the left great and second toes from a previous accident.  Pulses intact and sensation intact.      Lab/other results:    NMR reveals a total cholesterol 115.  Triglycerides elevated at 165.  LDL particle number excellent 654.  Small LDL particle number excellent at 300.  HDL particle number is low at 28.4.  CMP normal except glucose 150 and sodium low at 135.  Hemoglobin A1c 7.6.  Microalbumin/creatinine ratio normal.  PSA normal at 1.48.  Uric acid normal at 4.2.  Thyroid function test normal.  Vitamin D normal at 33.5.  CPK normal.  CBC normal except white count elevated at 14.93.    Assessment/Plan:     Diagnosis Plan   1. Routine physical examination     2. Type 2 diabetes mellitus without complication, without long-term current use of insulin (CMS/[facility])     3. Hyperlipidemia     4. Benign essential hypertension     5. Chronic anemia     6. Primary hypothyroidism     7. Vitamin D deficiency     8. History of partial colectomy, 12/17/2014--ileo-cecal colectomy with primary reanastomosis.  Obstruction secondary to NSAID-induced inflamed ileum.     9. Gout     10. Benign prostatic hypertrophy     11. Diabetic foot exam     12. Therapeutic drug monitoring       Patient presents with essentially normal annual physical except for the following issues: He has type 2 diabetes that is under good control.  Hyperlipidemia is under reasonable control as well as his blood pressure.  He has a chronic anemia which appears to have resolved but there is a mild leukocytosis of doubtful significance.  Will monitor.  Thyroid is therapeutic.  Vitamin D is in the normal range.  Gout is stable on allopurinol with good uric acid levels.  BPH is currently not a big issue.    Several preventative health issues discussed including review of vaccinations and recommendations, including dietary issues, exercise and  weight loss.  Safe sex practices discussed.  Patient advised to wear seatbelt whenever driving and avoid texting and driving.  Also advised to look both ways before crossing the street.  Colon cancer prevention discussed and is up-to-date with colonoscopy.  Advised to avoid tobacco products and minimize alcohol consumption.    Plan is as follows: No change in current medical regimen.  Encourage patient to get diabetic eye exam.  Encourage patient to get vascular screening.  PPSV23 given.  Patient will follow-up in 6 months with lab prior or follow-up as needed.    Procedures

## 2020-06-21 DIAGNOSIS — E03.9 HYPOTHYROIDISM, UNSPECIFIED TYPE: Chronic | ICD-10-CM

## 2020-06-21 DIAGNOSIS — I10 BENIGN ESSENTIAL HYPERTENSION: Chronic | ICD-10-CM

## 2020-06-21 DIAGNOSIS — Z87.39 HISTORY OF GOUT: Chronic | ICD-10-CM

## 2020-06-21 DIAGNOSIS — E11.9 TYPE 2 DIABETES MELLITUS WITHOUT COMPLICATION (HCC): ICD-10-CM

## 2020-06-22 RX ORDER — ALLOPURINOL 300 MG/1
TABLET ORAL
Qty: 30 TABLET | Refills: 0 | Status: SHIPPED | OUTPATIENT
Start: 2020-06-22 | End: 2020-06-23

## 2020-06-22 RX ORDER — LEVOTHYROXINE SODIUM 0.05 MG/1
TABLET ORAL
Qty: 30 TABLET | Refills: 0 | Status: SHIPPED | OUTPATIENT
Start: 2020-06-22 | End: 2020-06-23

## 2020-06-22 RX ORDER — GLIMEPIRIDE 4 MG/1
TABLET ORAL
Qty: 30 TABLET | Refills: 0 | Status: SHIPPED | OUTPATIENT
Start: 2020-06-22 | End: 2020-06-23

## 2020-06-23 DIAGNOSIS — I10 BENIGN ESSENTIAL HYPERTENSION: Chronic | ICD-10-CM

## 2020-06-23 DIAGNOSIS — Z87.39 HISTORY OF GOUT: Chronic | ICD-10-CM

## 2020-06-23 DIAGNOSIS — E11.9 TYPE 2 DIABETES MELLITUS WITHOUT COMPLICATION (HCC): ICD-10-CM

## 2020-06-23 DIAGNOSIS — E03.9 HYPOTHYROIDISM, UNSPECIFIED TYPE: Chronic | ICD-10-CM

## 2020-06-23 RX ORDER — GLIMEPIRIDE 4 MG/1
TABLET ORAL
Qty: 30 TABLET | Refills: 5 | Status: SHIPPED | OUTPATIENT
Start: 2020-06-23 | End: 2020-09-24

## 2020-06-23 RX ORDER — ALLOPURINOL 300 MG/1
TABLET ORAL
Qty: 30 TABLET | Refills: 5 | Status: SHIPPED | OUTPATIENT
Start: 2020-06-23 | End: 2021-03-15

## 2020-06-23 RX ORDER — LEVOTHYROXINE SODIUM 0.05 MG/1
TABLET ORAL
Qty: 30 TABLET | Refills: 5 | Status: SHIPPED | OUTPATIENT
Start: 2020-06-23 | End: 2021-01-04

## 2020-09-16 ENCOUNTER — TELEPHONE (OUTPATIENT)
Dept: INTERNAL MEDICINE | Facility: CLINIC | Age: 65
End: 2020-09-16

## 2020-09-16 NOTE — TELEPHONE ENCOUNTER
Spoke to patient's sister, Jackeline, she said that patient would not go to the ER or the doctor's office.  Patient was seen by EMS and they did not think that he had a stroke.  She said that patient has had a series of events this week. Patient fell in the hallway in the bathroom and patient did not know why he fell.  She said that he is in denial and will not go to the ER due to cost. He is having difficulty putting things in order of occurrence.  Spoke to patient and he said that he is doing fine, feels fine.  He said that EMS did not think that he had a stroke and he sees no reason for him to go to the ER.  He does not have insurance and he does not want a big bill for nothing.  Please advise.

## 2020-09-16 NOTE — TELEPHONE ENCOUNTER
This is not the way this should be handled.  This should have been brought to my attention immediately.  Patient must go to the emergency room.

## 2020-09-16 NOTE — TELEPHONE ENCOUNTER
The patient sister Jackeline called in stating the patient was pulled over by the  this morning for driving very slow with no head lights on. The officer seems to think it could have been a stroke.    She is not on the verbal but I did speak with the patient who said it's ok to speak with her and he will add her to the verbal release next time he comes in the office.    Best call back [phone]

## 2020-09-24 ENCOUNTER — OFFICE VISIT (OUTPATIENT)
Dept: INTERNAL MEDICINE | Facility: CLINIC | Age: 65
End: 2020-09-24

## 2020-09-24 VITALS
OXYGEN SATURATION: 99 % | DIASTOLIC BLOOD PRESSURE: 66 MMHG | BODY MASS INDEX: 30.84 KG/M2 | WEIGHT: 248 LBS | SYSTOLIC BLOOD PRESSURE: 114 MMHG | HEART RATE: 76 BPM | HEIGHT: 75 IN

## 2020-09-24 DIAGNOSIS — I10 BENIGN ESSENTIAL HYPERTENSION: Chronic | ICD-10-CM

## 2020-09-24 DIAGNOSIS — E11.9 TYPE 2 DIABETES MELLITUS WITHOUT COMPLICATION, WITHOUT LONG-TERM CURRENT USE OF INSULIN (HCC): Primary | Chronic | ICD-10-CM

## 2020-09-24 PROBLEM — Z71.6 ENCOUNTER FOR SMOKING CESSATION COUNSELING: Status: RESOLVED | Noted: 2019-05-02 | Resolved: 2020-09-24

## 2020-09-24 PROBLEM — F17.210 NICOTINE DEPENDENCE, CIGARETTES, UNCOMPLICATED: Chronic | Status: ACTIVE | Noted: 2019-05-02

## 2020-09-24 PROCEDURE — 99213 OFFICE O/P EST LOW 20 MIN: CPT | Performed by: INTERNAL MEDICINE

## 2020-09-24 NOTE — PROGRESS NOTES
"             09/24/2020    Patient Information  Navarro Bruno                                                                                          [address]  [facility] [address]      1955  [unfilled]  There is no work phone number on file.    Chief Complaint:     \"My supervisor said I had a stroke and then fired me.\"    History of Present Illness:    September 24, 2020--patient reports that he drove his truck into the grass and got stuck inadvertently while working.  The truck was subsequently pulled out of the grass but then his supervisor indicated that he may have had a stroke and she also dismissed him from employment.  Apparently EMS was called and they did not feel that he had a stroke.  Patient reports he had absolutely no symptoms.  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  He reports he is having no symptoms at present time.  He indicates that he has felt great ever since he came from that incident.    Review of Systems   Constitution: Negative.   HENT: Negative.    Eyes: Negative.    Cardiovascular: Negative.    Respiratory: Negative.    Endocrine: Negative.    Hematologic/Lymphatic: Negative.    Skin: Negative.    Musculoskeletal: Negative.    Gastrointestinal: Negative.    Genitourinary: Negative.    Neurological: Negative.    Psychiatric/Behavioral: Negative.    Allergic/Immunologic: Negative.        Active Problems:    Patient Active Problem List   Diagnosis   • Chronic anemia   • Benign essential hypertension   • Hyperlipidemia   • Primary hypothyroidism   • Irritable bladder   • Nocturia   • Type 2 diabetes mellitus without complication, without long-term current use of insulin (CMS/[facility])   • Vitamin D deficiency   • Therapeutic drug monitoring   • Routine physical examination   • Chronic diarrhea   • History of partial colectomy, 12/17/2014--ileo-cecal colectomy with primary reanastomosis.  Obstruction secondary to " NSAID-induced inflamed ileum.   • Gout   • Benign prostatic hypertrophy   • Diabetic eye exam (CMS/[facility])   • Diabetic foot exam   • Nicotine dependence, cigarettes, uncomplicated   • Medical non-compliance         Past Medical History:   Diagnosis Date   • Benign essential hypertension 4/19/2007 04/19/2007--treatment for hypertension begun.   • Benign prostatic hypertrophy 7/25/2016 07/25/2016--patient reports irritable bladder symptoms have resolved and he stopped using the Myrbetriq.  05/11/2015--patient presents with a one-month history of urinary urgency, occasional urge incontinence without dysuria. Patient has nocturia 2-3 times per night. Myrbetric 50 mg, one half by mouth daily initiated.   • Chronic anemia 2/4/2015 07/19/2016--hemoglobin slightly low at 13.3.  Hematocrit normal at 42.0.  05/11/2015--patient seen in follow-up and had a recent colonoscopy which was negative. Serum iron studies were normal. Homocystine and methylmalonic acid were normal. No further workup. Recent CBC reveals a slightly low hemoglobin but a normal hematocrit.   02/04/2015--routine CBC reveals a low hemoglobin of 11.5, hematocrit   • Chronic diarrhea 4/18/2016 07/25/2016--patient seen in follow-up and reports his diarrhea has improved but not resolved.  He reports that he WelChol did not help but he only took a maximum of 2 pills per day.  04/18/2016--patient with a history of partial colectomy performed in 2014 performed for an inflamed terminal ileum.  Part of the terminal ileum in the cecum removed.  Since that time patient presents with chronic diar   • Diabetic eye exam (CMS/[facility]) 2/3/2017    02/03/2017--patient reports he has not had a diabetic eye examination.  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  Order given.   • Diabetic foot exam 2/3/2017    02/03/2017--diabetic foot examination reveals no ulcers and no pre-ulcerative calluses.  He was left great toe and second toe have  been ambulated from a lawnmower accident as a teenager.  Sensation is intact.   • Gout 7/15/2010    07/15/2010--patient presented with complaints of left foot pain. Initial diagnosis of gout. Uric acid 8.3. Treatment begun with allopurinol.   • History of echocardiogram 12/14/2014 12/14/2014--echocardiogram reveals left ventricular chamber size is normal. Mild concentric left ventricular hypertrophy. Normal left ventricular systolic function with ejection fraction of 58%. Abnormal left ventricular diastolic function is observed. Left Atrium normal. Right ventricular cavity size is mildly enlarged. The right ventricular global systolic function is normal. Right atrium is nor   • History of Hepatitis, infectious Age 18    18 years of age--patient had infectious hepatitis. Exact type unknown.   • History of partial colectomy, 12/17/2014--ileo-cecal colectomy with primary reanastomosis. 12/17/2014 12/17/2014--patient presented with a small bowel obstruction secondary to inflamed terminal ileum. He underwent ileo-cecal colectomy with primary reanastomosis.   • History of small bowel obstruction 12/15/2014    01/19/2015--patient seen in follow-up and is doing well. No change in current medical regimen at the present time. Set up fasting lab and follow up. I explained to patient that we will need to monitor him for the development of vitamin B12 deficiency. He will certainly be at risk for this.   12/17/2014--patient presented with a small bowel obstruction secondary to inflamed terminal ileum. He under   • Hyperlipidemia 6/13/2007 06/13/2007--treatment for hyperlipidemia begun.   • Hypothyroidism 5/8/2015 05/19/2017--TSH elevated at 5.33.  Free T4 and free T3 are normal.  It appears the patient truly has hypothyroidism.  Levothyroxine 50 µg per day initiated.  01/27/2016--repeat thyroid function tests normal.  11/11/2015--TSH elevated at 5.27. Free T4 low normal at 0.92. TPO antibodies less than 6. Repeat  thyroid function tests ordered.  05/08/2015--TSH mildly elevated at 4.5. Free T4 low normal at   • Irritable bladder 5/11/2016 02/03/2017--patient reports that he is irritable bladder symptoms have returned but they seem to be intermittent.  He describes urgency and frequency.  However, he was diabetes is not under optimal control and could be playing a role.  His A1c is less than 8 at the present time.  In the past Myrbetriq seemed to be helpful and I will give patient samples to have at the time that he needs it.  He is   • Medical non-compliance 5/2/2019   • Nicotine dependence, cigarettes, uncomplicated 5/2/2019   • Nocturia 3/29/2016    02/03/2017--patient seen in follow-up and continues to have nocturia 2-3 times per night.  He has irritable bladder symptoms consisting of urgency and occasional incontinence has returned as well.  Myrbetriq samples given and patient can use that as needed.  07/25/2016--patient reports irritable bladder symptoms have resolved and he stopped using the Myrbetriq.  05/11/2015--patient presents with a   • Type 2 diabetes mellitus without complication, without long-term current use of insulin (CMS/[facility]) 3/9/2007    03/09/2007--patient presented with polyuria and polydipsia. Initial diagnosis of diabetes. Serum glucose 252, initial hemoglobin A1c 10.4.   • Vitamin D deficiency 4/12/2016         Past Surgical History:   Procedure Laterality Date   • AMPUTATION FOOT / TOE  18 years old    18 years of age--left great toe and left second toe amputation from a lawnmower accident.   • APPENDECTOMY  11 years old    11 years of age.   • CATARACT EXTRACTION Bilateral 2013 2013--patient reports he had bilateral cataract surgery about 4 years ago.  Intraocular lenses placed.   • COLECTOMY PARTIAL / TOTAL  12/17/2014 12/17/2014--patient presented with a small bowel obstruction secondary to inflamed terminal ileum. He underwent ileo-cecal colectomy with primary reanastomosis.   •  COLONOSCOPY  04/06/2015 04/06/2015--colonoscopy revealed small internal hemorrhoids. Well healed ileocolic anastomosis. Fair prep.   • EXPLORATORY LAPAROTOMY  12/17/2014 12/17/2014--patient presented with a small bowel obstruction secondary to inflamed terminal ileum. He underwent ileo-cecal colectomy with primary reanastomosis.         No Known Allergies        Current Outpatient Medications:   •  allopurinol (ZYLOPRIM) 300 MG tablet, TAKE 1 TABLET BY MOUTH ONE TIME A DAY , Disp: 30 tablet, Rfl: 5  •  atorvastatin (LIPITOR) 80 MG tablet, TAKE 1 TABLET BY MOUTH ONE TIME A DAY for cholesterol , Disp: 90 tablet, Rfl: 0  •  B-D ULTRAFINE III SHORT PEN 31G X 8 MM misc, Use with Victoza injections daily., Disp: , Rfl: 3  •  Cholecalciferol (VITAMIN D3) 5000 UNITS capsule capsule, 1 by mouth daily as directed, Disp: 30 capsule, Rfl: 11  •  levothyroxine (SYNTHROID, LEVOTHROID) 50 MCG tablet, TAKE 1 TABLET BY MOUTH ONE TIME A DAY FOR LOW THYROID , Disp: 30 tablet, Rfl: 5  •  Liraglutide (VICTOZA) 18 MG/3ML solution pen-injector injection, Inject 1.8 mg subcutaneously daily as directed., Disp: 3 pen, Rfl: 5  •  metoprolol tartrate (LOPRESSOR) 25 MG tablet, Take one half p.o. twice daily for blood pressure, Disp: 60 tablet, Rfl: 5  •  SITagliptin-metFORMIN HCl ER (JANUMET XR)  MG tablet, Take 2 p.o. daily for diabetes, Disp: 60 tablet, Rfl: 5      Family History   Problem Relation Age of Onset   • Scoliosis Mother         Amyotrophic Lateral Sclerosis   • Stroke Father         Father had multiple strokes.   • Diabetes Father         Type 2   • Diabetes Paternal Grandfather         Type 2         Social History     Socioeconomic History   • Marital status:      Spouse name: Not on file   • Number of children: Not on file   • Years of education: 14   • Highest education level: Not on file   Occupational History   • Occupation:  for [employer]   Social Needs   • Financial resource  "strain: Not hard at all   • Food insecurity     Worry: Never true     Inability: Never true   • Transportation needs     Medical: No     Non-medical: No   Tobacco Use   • Smoking status: Current Every Day Smoker     Packs/day: 1.00     Types: Cigarettes   • Smokeless tobacco: Never Used   Substance and Sexual Activity   • Alcohol use: Yes     Frequency: Monthly or less     Drinks per session: 1 or 2     Binge frequency: Never     Comment: Minimum consumption   • Drug use: No   • Sexual activity: Yes     Partners: Female   Lifestyle   • Physical activity     Days per week: 0 days     Minutes per session: 0 min   • Stress: Not at all   Relationships   • Social connections     Talks on phone: Patient refused     Gets together: Patient refused     Attends [employer] service: Patient refused     Active member of club or organization: Patient refused     Attends meetings of clubs or organizations: Patient refused     Relationship status: Patient refused         Vitals:    09/24/20 1304   BP: 114/66   BP Location: Left arm   Pulse: 76   SpO2: 99%   Weight: 112 kg (248 lb)   Height: 190.5 cm (75\")        Body mass index is 31 kg/m².      Physical Exam:    General: Alert and oriented x 3.  No acute distress.  Normal affect.  HEENT: Pupils equal, round, reactive to light; extraocular movements intact; sclerae nonicteric; pharynx, ear canals and TMs normal.  Neck: Without JVD, thyromegaly, bruit, or adenopathy.  Lungs: Clear to auscultation in all fields.  Heart: Regular rate and rhythm without murmur, rub, gallop, or click.  Abdomen: Soft, nontender, without hepatosplenomegaly or hernia.  Bowel sounds normal.  : Deferred.  Rectal: Deferred.  Extremities: Without clubbing, cyanosis, edema, or pulse deficit.  Neurologic: Intact without focal deficit.  Normal station and gait observed during ingress and egress from the examination room.  There is absolutely no neurologic deficit.  Skin: Without significant lesion.  " Musculoskeletal: Unremarkable.    Lab/other results:      Assessment/Plan:     Diagnosis Plan   1. Type 2 diabetes mellitus without complication, without long-term current use of insulin (CMS/[facility])     2. Benign essential hypertension  metoprolol tartrate (LOPRESSOR) 25 MG tablet     Patient has type 2 diabetes and I noticed that he has lost 42 pounds since November of last year.  Although patient does not report any symptoms, I am wondering if he had hypoglycemic spells.  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  Apparently he has a DOT physical that is in good standing now.  His blood pressure is little on the low side and he is taking metoprolol tartrate 25 mg/day.    Plan is as follows: Discontinue glimepiride altogether.  Stay on Victoza and Janumet.  Start cutting metoprolol tartrate in half twice daily.  Follow-up in 4 weeks to reassess the situation.  I explained to patient that any further DOT issues need to be taken up with [employer] if they arise.      Procedures

## 2020-11-05 DIAGNOSIS — E55.9 VITAMIN D DEFICIENCY: Chronic | ICD-10-CM

## 2020-11-05 DIAGNOSIS — D64.9 CHRONIC ANEMIA: ICD-10-CM

## 2020-11-05 DIAGNOSIS — E11.9 TYPE 2 DIABETES MELLITUS WITHOUT COMPLICATION, WITHOUT LONG-TERM CURRENT USE OF INSULIN (HCC): Chronic | ICD-10-CM

## 2020-11-05 DIAGNOSIS — E78.2 MIXED HYPERLIPIDEMIA: Chronic | ICD-10-CM

## 2020-11-05 DIAGNOSIS — E03.9 PRIMARY HYPOTHYROIDISM: Chronic | ICD-10-CM

## 2020-11-05 DIAGNOSIS — Z87.39 HISTORY OF GOUT: Chronic | ICD-10-CM

## 2020-11-20 ENCOUNTER — OFFICE VISIT (OUTPATIENT)
Dept: INTERNAL MEDICINE | Facility: CLINIC | Age: 65
End: 2020-11-20

## 2020-11-20 VITALS
WEIGHT: 247.8 LBS | SYSTOLIC BLOOD PRESSURE: 126 MMHG | DIASTOLIC BLOOD PRESSURE: 70 MMHG | BODY MASS INDEX: 30.81 KG/M2 | HEART RATE: 77 BPM | OXYGEN SATURATION: 97 % | HEIGHT: 75 IN

## 2020-11-20 DIAGNOSIS — E11.9 TYPE 2 DIABETES MELLITUS WITHOUT COMPLICATION, WITHOUT LONG-TERM CURRENT USE OF INSULIN (HCC): Primary | Chronic | ICD-10-CM

## 2020-11-20 DIAGNOSIS — I10 BENIGN ESSENTIAL HYPERTENSION: Chronic | ICD-10-CM

## 2020-11-20 DIAGNOSIS — E78.2 MIXED HYPERLIPIDEMIA: Chronic | ICD-10-CM

## 2020-11-20 DIAGNOSIS — Z51.81 THERAPEUTIC DRUG MONITORING: ICD-10-CM

## 2020-11-20 DIAGNOSIS — E03.9 PRIMARY HYPOTHYROIDISM: Chronic | ICD-10-CM

## 2020-11-20 PROCEDURE — 99213 OFFICE O/P EST LOW 20 MIN: CPT | Performed by: INTERNAL MEDICINE

## 2020-11-20 RX ORDER — LIRAGLUTIDE 6 MG/ML
INJECTION SUBCUTANEOUS
Qty: 9 PEN | Refills: 3 | Status: SHIPPED | OUTPATIENT
Start: 2020-11-20 | End: 2021-06-01 | Stop reason: SDUPTHER

## 2020-11-20 NOTE — PROGRESS NOTES
11/20/2020    Patient Information  Navarro Bruno                                                                                          [address]  [facility] [address]      1955  [unfilled]  There is no work phone number on file.    Chief Complaint:     Follow-up recent medication changes for diabetes and high blood pressure.    History of Present Illness:     Patient with a history of hypertension, hyperlipidemia, hypothyroidism, type 2 diabetes, gout, BPH, nicotine dependence from cigarettes.  He presents today to follow-up on recent visit that we had September 24 as noted below.  At that time I felt that he was having hypoglycemic spells.  There is no evidence of patient had any sort of seizure activity.  His past medical history reviewed and updated were necessary.    He history regarding suspected hypoglycemic spells, diabetes with recent medication changes/reduction and hypertension with recent medication changes/reduction is as follows:    September 24, 2020--patient reports that he drove his truck into the grass and got stuck inadvertently while working.  The truck was subsequently pulled out of the grass but then his supervisor indicated that he may have had a stroke and she also dismissed him from employment.  Apparently EMS was called and they did not feel that he had a stroke.  Patient reports he had absolutely no symptoms.  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  He reports he is having no symptoms at present time.  He indicates that he has felt great ever since he came from that incident.    Patient has type 2 diabetes and I noticed that he has lost 42 pounds since November of last year.  Although patient does not report any symptoms, I am wondering if he had hypoglycemic spells.  He wants me to give him clearance to pursue a job requiring a DOT clearance and I explained to him that I cannot do that any longer  because under [place] law only certain positions are trained to do that.  Apparently he has a DOT physical that is in good standing now.  His blood pressure is little on the low side and he is taking metoprolol tartrate 25 mg/day.     Plan is as follows: Discontinue glimepiride altogether.  Stay on Victoza and Janumet.  Start cutting metoprolol tartrate in half twice daily.  Follow-up in 4 weeks to reassess the situation.  I explained to patient that any further DOT issues need to be taken up with [employer] works if they arise.    Review of Systems   Constitution: Negative.   HENT: Negative.    Eyes: Negative.    Cardiovascular: Negative.    Respiratory: Negative.    Endocrine: Negative.    Hematologic/Lymphatic: Negative.    Skin: Negative.    Musculoskeletal: Negative.    Gastrointestinal: Negative.    Genitourinary: Negative.    Neurological: Negative.    Psychiatric/Behavioral: Negative.    Allergic/Immunologic: Negative.        Active Problems:    Patient Active Problem List   Diagnosis   • Chronic anemia   • Benign essential hypertension   • Hyperlipidemia   • Primary hypothyroidism   • Irritable bladder   • Nocturia   • Type 2 diabetes mellitus without complication, without long-term current use of insulin (CMS/[facility])   • Vitamin D deficiency   • Therapeutic drug monitoring   • Routine physical examination   • Chronic diarrhea   • History of partial colectomy, 12/17/2014--ileo-cecal colectomy with primary reanastomosis.  Obstruction secondary to NSAID-induced inflamed ileum.   • Gout   • Benign prostatic hypertrophy   • Diabetic eye exam (CMS/[facility])   • Diabetic foot exam   • Nicotine dependence, cigarettes, uncomplicated   • Medical non-compliance         Past Medical History:   Diagnosis Date   • Benign essential hypertension 4/19/2007 04/19/2007--treatment for hypertension begun.   • Benign prostatic hypertrophy 7/25/2016 07/25/2016--patient reports irritable bladder symptoms have resolved and he stopped  using the Myrbetriq.  05/11/2015--patient presents with a one-month history of urinary urgency, occasional urge incontinence without dysuria. Patient has nocturia 2-3 times per night. Myrbetric 50 mg, one half by mouth daily initiated.   • Chronic anemia 2/4/2015 07/19/2016--hemoglobin slightly low at 13.3.  Hematocrit normal at 42.0.  05/11/2015--patient seen in follow-up and had a recent colonoscopy which was negative. Serum iron studies were normal. Homocystine and methylmalonic acid were normal. No further workup. Recent CBC reveals a slightly low hemoglobin but a normal hematocrit.   02/04/2015--routine CBC reveals a low hemoglobin of 11.5, hematocrit   • Chronic diarrhea 4/18/2016 07/25/2016--patient seen in follow-up and reports his diarrhea has improved but not resolved.  He reports that he WelChol did not help but he only took a maximum of 2 pills per day.  04/18/2016--patient with a history of partial colectomy performed in 2014 performed for an inflamed terminal ileum.  Part of the terminal ileum in the cecum removed.  Since that time patient presents with chronic diar   • Diabetic eye exam (CMS/[facility]) 2/3/2017    02/03/2017--patient reports he has not had a diabetic eye examination.  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  Order given.   • Diabetic foot exam 2/3/2017    02/03/2017--diabetic foot examination reveals no ulcers and no pre-ulcerative calluses.  He was left great toe and second toe have been ambulated from a lawnmower accident as a teenager.  Sensation is intact.   • Gout 7/15/2010    07/15/2010--patient presented with complaints of left foot pain. Initial diagnosis of gout. Uric acid 8.3. Treatment begun with allopurinol.   • History of echocardiogram 12/14/2014 12/14/2014--echocardiogram reveals left ventricular chamber size is normal. Mild concentric left ventricular hypertrophy. Normal left ventricular systolic function with ejection fraction of  58%. Abnormal left ventricular diastolic function is observed. Left Atrium normal. Right ventricular cavity size is mildly enlarged. The right ventricular global systolic function is normal. Right atrium is nor   • History of Hepatitis, infectious Age 18    18 years of age--patient had infectious hepatitis. Exact type unknown.   • History of partial colectomy, 12/17/2014--ileo-cecal colectomy with primary reanastomosis. 12/17/2014 12/17/2014--patient presented with a small bowel obstruction secondary to inflamed terminal ileum. He underwent ileo-cecal colectomy with primary reanastomosis.   • History of small bowel obstruction 12/15/2014    01/19/2015--patient seen in follow-up and is doing well. No change in current medical regimen at the present time. Set up fasting lab and follow up. I explained to patient that we will need to monitor him for the development of vitamin B12 deficiency. He will certainly be at risk for this.   12/17/2014--patient presented with a small bowel obstruction secondary to inflamed terminal ileum. He under   • Hyperlipidemia 6/13/2007 06/13/2007--treatment for hyperlipidemia begun.   • Hypothyroidism 5/8/2015 05/19/2017--TSH elevated at 5.33.  Free T4 and free T3 are normal.  It appears the patient truly has hypothyroidism.  Levothyroxine 50 µg per day initiated.  01/27/2016--repeat thyroid function tests normal.  11/11/2015--TSH elevated at 5.27. Free T4 low normal at 0.92. TPO antibodies less than 6. Repeat thyroid function tests ordered.  05/08/2015--TSH mildly elevated at 4.5. Free T4 low normal at   • Irritable bladder 5/11/2016 02/03/2017--patient reports that he is irritable bladder symptoms have returned but they seem to be intermittent.  He describes urgency and frequency.  However, he was diabetes is not under optimal control and could be playing a role.  His A1c is less than 8 at the present time.  In the past Myrbetriq seemed to be helpful and I will give patient  samples to have at the time that he needs it.  He is   • Medical non-compliance 5/2/2019   • Nicotine dependence, cigarettes, uncomplicated 5/2/2019   • Nocturia 3/29/2016    02/03/2017--patient seen in follow-up and continues to have nocturia 2-3 times per night.  He has irritable bladder symptoms consisting of urgency and occasional incontinence has returned as well.  Myrbetriq samples given and patient can use that as needed.  07/25/2016--patient reports irritable bladder symptoms have resolved and he stopped using the Myrbetriq.  05/11/2015--patient presents with a   • Type 2 diabetes mellitus without complication, without long-term current use of insulin (CMS/[facility]) 3/9/2007    03/09/2007--patient presented with polyuria and polydipsia. Initial diagnosis of diabetes. Serum glucose 252, initial hemoglobin A1c 10.4.   • Vitamin D deficiency 4/12/2016         Past Surgical History:   Procedure Laterality Date   • AMPUTATION FOOT / TOE  18 years old    18 years of age--left great toe and left second toe amputation from a lawnmower accident.   • APPENDECTOMY  11 years old    11 years of age.   • CATARACT EXTRACTION Bilateral 2013 2013--patient reports he had bilateral cataract surgery about 4 years ago.  Intraocular lenses placed.   • COLECTOMY PARTIAL / TOTAL  12/17/2014 12/17/2014--patient presented with a small bowel obstruction secondary to inflamed terminal ileum. He underwent ileo-cecal colectomy with primary reanastomosis.   • COLONOSCOPY  04/06/2015 04/06/2015--colonoscopy revealed small internal hemorrhoids. Well healed ileocolic anastomosis. Fair prep.   • EXPLORATORY LAPAROTOMY  12/17/2014 12/17/2014--patient presented with a small bowel obstruction secondary to inflamed terminal ileum. He underwent ileo-cecal colectomy with primary reanastomosis.         No Known Allergies        Current Outpatient Medications:   •  allopurinol (ZYLOPRIM) 300 MG tablet, TAKE 1 TABLET BY MOUTH ONE TIME A DAY ,  Disp: 30 tablet, Rfl: 5  •  atorvastatin (LIPITOR) 80 MG tablet, TAKE 1 TABLET BY MOUTH ONE TIME A DAY for cholesterol , Disp: 90 tablet, Rfl: 0  •  B-D ULTRAFINE III SHORT PEN 31G X 8 MM misc, Use with Victoza injections daily., Disp: , Rfl: 3  •  levothyroxine (SYNTHROID, LEVOTHROID) 50 MCG tablet, TAKE 1 TABLET BY MOUTH ONE TIME A DAY FOR LOW THYROID , Disp: 30 tablet, Rfl: 5  •  Liraglutide (Victoza) 18 MG/3ML solution pen-injector injection, Inject 1.8 mg subcutaneously daily as directed for diabetes, Disp: 9 pen, Rfl: 3  •  metoprolol tartrate (LOPRESSOR) 25 MG tablet, Take one half p.o. twice daily for blood pressure, Disp: 60 tablet, Rfl: 5  •  SITagliptin-metFORMIN HCl ER (JANUMET XR)  MG tablet, Take 2 p.o. daily for diabetes, Disp: 60 tablet, Rfl: 5  •  Cholecalciferol (VITAMIN D3) 5000 UNITS capsule capsule, 1 by mouth daily as directed, Disp: 30 capsule, Rfl: 11      Family History   Problem Relation Age of Onset   • Scoliosis Mother         Amyotrophic Lateral Sclerosis   • Stroke Father         Father had multiple strokes.   • Diabetes Father         Type 2   • Diabetes Paternal Grandfather         Type 2         Social History     Socioeconomic History   • Marital status:      Spouse name: Not on file   • Number of children: Not on file   • Years of education: 14   • Highest education level: Not on file   Occupational History   • Occupation:  for [employer]   Social Needs   • Financial resource strain: Not hard at all   • Food insecurity     Worry: Never true     Inability: Never true   • Transportation needs     Medical: No     Non-medical: No   Tobacco Use   • Smoking status: Current Every Day Smoker     Packs/day: 1.00     Types: Cigarettes   • Smokeless tobacco: Never Used   Substance and Sexual Activity   • Alcohol use: Yes     Frequency: Monthly or less     Drinks per session: 1 or 2     Binge frequency: Never     Comment: Minimum consumption   • Drug use: No  "  • Sexual activity: Yes     Partners: Female   Lifestyle   • Physical activity     Days per week: 0 days     Minutes per session: 0 min   • Stress: Not at all   Relationships   • Social connections     Talks on phone: Patient refused     Gets together: Patient refused     Attends Gnosticism service: Patient refused     Active member of club or organization: Patient refused     Attends meetings of clubs or organizations: Patient refused     Relationship status: Patient refused         Vitals:    11/20/20 1535   BP: 126/70   BP Location: Left arm   Pulse: 77   SpO2: 97%   Weight: 112 kg (247 lb 12.8 oz)   Height: 190.5 cm (75\")        Body mass index is 30.97 kg/m².      Physical Exam:    General: Alert and oriented x 3.  No acute distress.  Normal affect.  Obese.  HEENT: Pupils equal, round, reactive to light; extraocular movements intact; sclerae nonicteric; pharynx, ear canals and TMs normal.  Neck: Without JVD, thyromegaly, bruit, or adenopathy.  Lungs: Clear to auscultation in all fields.  Heart: Regular rate and rhythm without murmur, rub, gallop, or click.  Abdomen: Soft, nontender, without hepatosplenomegaly or hernia.  Bowel sounds normal.  : Deferred.  Rectal: Deferred.  Extremities: Without clubbing, cyanosis, edema, or pulse deficit.  Neurologic: Intact without focal deficit.  Normal station and gait observed during ingress and egress from the examination room.  Skin: Without significant lesion.  Musculoskeletal: Unremarkable.    Lab/other results:      Assessment/Plan:     Diagnosis Plan   1. Type 2 diabetes mellitus without complication, without long-term current use of insulin (CMS/[facility])  Comprehensive Metabolic Panel    Hemoglobin A1c    Liraglutide (Victoza) 18 MG/3ML solution pen-injector injection   2. Benign essential hypertension     3. Hyperlipidemia  NMR LipoProfile   4. Primary hypothyroidism  TSH    T4, Free    T3, Free   5. Therapeutic drug monitoring  CBC (No Diff)     Patient has type 2 " diabetes and has lost quite a bit of weight and therefore we discontinued glimepiride which was likely causing hypoglycemic spells.  I suspect that the current regimen of Janumet and Victoza will be adequate to control his diabetes.  His blood pressure remains controlled despite reduction in his metoprolol to a half a pill twice per day.  Overall seems to be doing fairly well.    Plan is as follows: No change in current medical regimen.  Patient has a lab and follow-up appointment December 16 of this year but we will reschedule this for sometime in March 2021.        Procedures

## 2021-01-01 DIAGNOSIS — E03.9 HYPOTHYROIDISM, UNSPECIFIED TYPE: Chronic | ICD-10-CM

## 2021-01-04 RX ORDER — LEVOTHYROXINE SODIUM 0.05 MG/1
TABLET ORAL
Qty: 30 TABLET | Refills: 5 | Status: SHIPPED | OUTPATIENT
Start: 2021-01-04 | End: 2021-07-20

## 2021-02-02 DIAGNOSIS — E78.5 HYPERLIPIDEMIA, UNSPECIFIED HYPERLIPIDEMIA TYPE: Chronic | ICD-10-CM

## 2021-02-03 RX ORDER — ATORVASTATIN CALCIUM 80 MG/1
TABLET, FILM COATED ORAL
Qty: 90 TABLET | Refills: 0 | Status: SHIPPED | OUTPATIENT
Start: 2021-02-03 | End: 2021-03-15

## 2021-03-13 DIAGNOSIS — E78.5 HYPERLIPIDEMIA, UNSPECIFIED HYPERLIPIDEMIA TYPE: Chronic | ICD-10-CM

## 2021-03-13 DIAGNOSIS — Z87.39 HISTORY OF GOUT: Chronic | ICD-10-CM

## 2021-03-13 DIAGNOSIS — E78.2 MIXED HYPERLIPIDEMIA: Primary | ICD-10-CM

## 2021-03-13 DIAGNOSIS — I10 BENIGN ESSENTIAL HYPERTENSION: Chronic | ICD-10-CM

## 2021-03-15 RX ORDER — ALLOPURINOL 300 MG/1
TABLET ORAL
Qty: 30 TABLET | Refills: 6 | Status: SHIPPED | OUTPATIENT
Start: 2021-03-15 | End: 2021-12-09

## 2021-03-15 RX ORDER — ATORVASTATIN CALCIUM 80 MG/1
TABLET, FILM COATED ORAL
Qty: 90 TABLET | Refills: 1 | Status: SHIPPED | OUTPATIENT
Start: 2021-03-15 | End: 2022-04-12

## 2021-03-22 ENCOUNTER — BULK ORDERING (OUTPATIENT)
Dept: CASE MANAGEMENT | Facility: OTHER | Age: 66
End: 2021-03-22

## 2021-03-22 DIAGNOSIS — Z23 IMMUNIZATION DUE: ICD-10-CM

## 2021-03-24 ENCOUNTER — TELEPHONE (OUTPATIENT)
Dept: INTERNAL MEDICINE | Facility: CLINIC | Age: 66
End: 2021-03-24

## 2021-03-24 NOTE — TELEPHONE ENCOUNTER
[facility] ([facility])    CALLING IN REGARDS TO REQUEST THAT DR BRANHAM PLEASE FAX COPIES OF THE APPLICATION DIRECTLY FROM HIS OFFICE TO THE [facility]ARMACY [facility] FOR THE RX OF VICTOZA 1.8MG . FAX NUMBER [phone].PLEASE ADVISE,THANK YOU!

## 2021-03-26 ENCOUNTER — LAB (OUTPATIENT)
Dept: LAB | Facility: HOSPITAL | Age: 66
End: 2021-03-26

## 2021-03-26 DIAGNOSIS — E78.2 MIXED HYPERLIPIDEMIA: Chronic | ICD-10-CM

## 2021-03-26 DIAGNOSIS — Z51.81 THERAPEUTIC DRUG MONITORING: ICD-10-CM

## 2021-03-26 DIAGNOSIS — E03.9 PRIMARY HYPOTHYROIDISM: Chronic | ICD-10-CM

## 2021-03-26 DIAGNOSIS — E11.9 TYPE 2 DIABETES MELLITUS WITHOUT COMPLICATION, WITHOUT LONG-TERM CURRENT USE OF INSULIN (HCC): Chronic | ICD-10-CM

## 2021-03-26 LAB
25(OH)D3 SERPL-MCNC: 29.4 NG/ML (ref 30–100)
ALBUMIN SERPL-MCNC: 4 G/DL (ref 3.5–5.2)
ALBUMIN/GLOB SERPL: 1.5 G/DL
ALP SERPL-CCNC: 89 U/L (ref 39–117)
ALT SERPL W P-5'-P-CCNC: 6 U/L (ref 1–41)
ANION GAP SERPL CALCULATED.3IONS-SCNC: 9.2 MMOL/L (ref 5–15)
AST SERPL-CCNC: 11 U/L (ref 1–40)
BASOPHILS # BLD AUTO: 0.07 10*3/MM3 (ref 0–0.2)
BASOPHILS NFR BLD AUTO: 0.7 % (ref 0–1.5)
BILIRUB SERPL-MCNC: 0.3 MG/DL (ref 0–1.2)
BUN SERPL-MCNC: 12 MG/DL (ref 8–23)
BUN/CREAT SERPL: 14.1 (ref 7–25)
CALCIUM SPEC-SCNC: 9.4 MG/DL (ref 8.6–10.5)
CHLORIDE SERPL-SCNC: 103 MMOL/L (ref 98–107)
CK SERPL-CCNC: 115 U/L (ref 20–200)
CO2 SERPL-SCNC: 26.8 MMOL/L (ref 22–29)
CREAT SERPL-MCNC: 0.85 MG/DL (ref 0.76–1.27)
DEPRECATED RDW RBC AUTO: 41.4 FL (ref 37–54)
EOSINOPHIL # BLD AUTO: 0.18 10*3/MM3 (ref 0–0.4)
EOSINOPHIL NFR BLD AUTO: 1.9 % (ref 0.3–6.2)
ERYTHROCYTE [DISTWIDTH] IN BLOOD BY AUTOMATED COUNT: 13 % (ref 12.3–15.4)
GFR SERPL CREATININE-BSD FRML MDRD: 90 ML/MIN/1.73
GLOBULIN UR ELPH-MCNC: 2.7 GM/DL
GLUCOSE SERPL-MCNC: 172 MG/DL (ref 65–99)
HBA1C MFR BLD: 7.79 % (ref 4.8–5.6)
HCT VFR BLD AUTO: 37.7 % (ref 37.5–51)
HGB BLD-MCNC: 12.4 G/DL (ref 13–17.7)
IMM GRANULOCYTES # BLD AUTO: 0.13 10*3/MM3 (ref 0–0.05)
IMM GRANULOCYTES NFR BLD AUTO: 1.4 % (ref 0–0.5)
LYMPHOCYTES # BLD AUTO: 2.05 10*3/MM3 (ref 0.7–3.1)
LYMPHOCYTES NFR BLD AUTO: 21.6 % (ref 19.6–45.3)
MCH RBC QN AUTO: 28.8 PG (ref 26.6–33)
MCHC RBC AUTO-ENTMCNC: 32.9 G/DL (ref 31.5–35.7)
MCV RBC AUTO: 87.7 FL (ref 79–97)
MONOCYTES # BLD AUTO: 0.66 10*3/MM3 (ref 0.1–0.9)
MONOCYTES NFR BLD AUTO: 7 % (ref 5–12)
NEUTROPHILS NFR BLD AUTO: 6.4 10*3/MM3 (ref 1.7–7)
NEUTROPHILS NFR BLD AUTO: 67.4 % (ref 42.7–76)
NRBC BLD AUTO-RTO: 0 /100 WBC (ref 0–0.2)
PLATELET # BLD AUTO: 255 10*3/MM3 (ref 140–450)
PMV BLD AUTO: 10.5 FL (ref 6–12)
POTASSIUM SERPL-SCNC: 4.2 MMOL/L (ref 3.5–5.2)
PROT SERPL-MCNC: 6.7 G/DL (ref 6–8.5)
RBC # BLD AUTO: 4.3 10*6/MM3 (ref 4.14–5.8)
SODIUM SERPL-SCNC: 139 MMOL/L (ref 136–145)
T3FREE SERPL-MCNC: 2.9 PG/ML (ref 2–4.4)
T4 FREE SERPL-MCNC: 1.08 NG/DL (ref 0.93–1.7)
TSH SERPL DL<=0.05 MIU/L-ACNC: 3.26 UIU/ML (ref 0.27–4.2)
URATE SERPL-MCNC: 5.3 MG/DL (ref 3.4–7)
WBC # BLD AUTO: 9.49 10*3/MM3 (ref 3.4–10.8)

## 2021-03-26 PROCEDURE — 82550 ASSAY OF CK (CPK): CPT | Performed by: INTERNAL MEDICINE

## 2021-03-26 PROCEDURE — 84439 ASSAY OF FREE THYROXINE: CPT | Performed by: INTERNAL MEDICINE

## 2021-03-26 PROCEDURE — 36415 COLL VENOUS BLD VENIPUNCTURE: CPT

## 2021-03-26 PROCEDURE — 85025 COMPLETE CBC W/AUTO DIFF WBC: CPT | Performed by: INTERNAL MEDICINE

## 2021-03-26 PROCEDURE — 80061 LIPID PANEL: CPT | Performed by: INTERNAL MEDICINE

## 2021-03-26 PROCEDURE — 84481 FREE ASSAY (FT-3): CPT | Performed by: INTERNAL MEDICINE

## 2021-03-26 PROCEDURE — 84550 ASSAY OF BLOOD/URIC ACID: CPT | Performed by: INTERNAL MEDICINE

## 2021-03-26 PROCEDURE — 84443 ASSAY THYROID STIM HORMONE: CPT | Performed by: INTERNAL MEDICINE

## 2021-03-26 PROCEDURE — 83704 LIPOPROTEIN BLD QUAN PART: CPT | Performed by: INTERNAL MEDICINE

## 2021-03-26 PROCEDURE — 82306 VITAMIN D 25 HYDROXY: CPT | Performed by: INTERNAL MEDICINE

## 2021-03-26 PROCEDURE — 80053 COMPREHEN METABOLIC PANEL: CPT | Performed by: INTERNAL MEDICINE

## 2021-03-26 PROCEDURE — 83036 HEMOGLOBIN GLYCOSYLATED A1C: CPT | Performed by: INTERNAL MEDICINE

## 2021-03-28 LAB
CHOLEST SERPL-MCNC: 121 MG/DL (ref 100–199)
HDL SERPL-SCNC: 32.7 UMOL/L
HDLC SERPL-MCNC: 53 MG/DL
LDL SERPL QN: 20.7 NM
LDL SERPL-SCNC: 432 NMOL/L
LDL SMALL SERPL-SCNC: 214 NMOL/L
LDLC SERPL CALC-MCNC: 44 MG/DL (ref 0–99)
TRIGL SERPL-MCNC: 144 MG/DL (ref 0–149)

## 2021-03-30 ENCOUNTER — OFFICE VISIT (OUTPATIENT)
Dept: INTERNAL MEDICINE | Facility: CLINIC | Age: 66
End: 2021-03-30

## 2021-03-30 VITALS
OXYGEN SATURATION: 98 % | DIASTOLIC BLOOD PRESSURE: 70 MMHG | WEIGHT: 252 LBS | HEART RATE: 53 BPM | BODY MASS INDEX: 31.33 KG/M2 | SYSTOLIC BLOOD PRESSURE: 142 MMHG | HEIGHT: 75 IN

## 2021-03-30 DIAGNOSIS — E55.9 VITAMIN D DEFICIENCY: ICD-10-CM

## 2021-03-30 DIAGNOSIS — Z90.49 HISTORY OF PARTIAL COLECTOMY: Chronic | ICD-10-CM

## 2021-03-30 DIAGNOSIS — N40.1 BENIGN NON-NODULAR PROSTATIC HYPERPLASIA WITH LOWER URINARY TRACT SYMPTOMS: Chronic | ICD-10-CM

## 2021-03-30 DIAGNOSIS — Z87.39 HISTORY OF GOUT: Chronic | ICD-10-CM

## 2021-03-30 DIAGNOSIS — E11.9 TYPE 2 DIABETES MELLITUS WITHOUT COMPLICATION, WITHOUT LONG-TERM CURRENT USE OF INSULIN (HCC): Primary | Chronic | ICD-10-CM

## 2021-03-30 DIAGNOSIS — E03.9 PRIMARY HYPOTHYROIDISM: Chronic | ICD-10-CM

## 2021-03-30 DIAGNOSIS — R35.1 NOCTURIA: Chronic | ICD-10-CM

## 2021-03-30 DIAGNOSIS — E78.2 MIXED HYPERLIPIDEMIA: Chronic | ICD-10-CM

## 2021-03-30 DIAGNOSIS — I10 BENIGN ESSENTIAL HYPERTENSION: Chronic | ICD-10-CM

## 2021-03-30 DIAGNOSIS — Z00.00 ROUTINE PHYSICAL EXAMINATION: ICD-10-CM

## 2021-03-30 DIAGNOSIS — K52.9 CHRONIC DIARRHEA: Chronic | ICD-10-CM

## 2021-03-30 DIAGNOSIS — Z51.81 THERAPEUTIC DRUG MONITORING: ICD-10-CM

## 2021-03-30 DIAGNOSIS — N32.89 IRRITABLE BLADDER: Chronic | ICD-10-CM

## 2021-03-30 PROCEDURE — 99214 OFFICE O/P EST MOD 30 MIN: CPT | Performed by: INTERNAL MEDICINE

## 2021-03-30 NOTE — TELEPHONE ENCOUNTER
I faxed it to that number.  I dont know why because pt said they had received the forms this morning when I spoke with him

## 2021-03-30 NOTE — PROGRESS NOTES
03/30/2021    Patient Information  Navarro Bruno                                                                                          [address] [facility] [address]      1955  [unfilled]  There is no work phone number on file.    Chief Complaint:     Follow-up blood work in order to monitor chronic medical issues listed in history of present illness.  No new acute complaints.    History of Present Illness:     Patient with a history of type 2 diabetes, hyperlipidemia, hypothyroidism, hypertension, gout, BPH, irritable bladder, nocturia, chronic diarrhea, history of partial colectomy.  He presents today for a follow-up with lab prior in order to monitor his chronic medical issues.  His past medical history reviewed and updated were necessary including health maintenance parameters.  This reveals he needs a AAA screen.  He also needs a diabetic eye exam which I encouraged him to get.    Review of Systems   Constitutional: Negative.   HENT: Negative.    Eyes: Negative.    Cardiovascular: Negative.    Respiratory: Negative.    Endocrine: Negative.    Hematologic/Lymphatic: Negative.    Skin: Negative.    Musculoskeletal: Negative.    Gastrointestinal: Negative.    Genitourinary: Positive for urgency. Negative for dysuria.   Neurological: Negative.    Psychiatric/Behavioral: Negative.    Allergic/Immunologic: Negative.        Active Problems:    Patient Active Problem List   Diagnosis   • Chronic anemia   • Benign essential hypertension   • Hyperlipidemia   • Primary hypothyroidism   • Irritable bladder   • Nocturia   • Type 2 diabetes mellitus without complication, without long-term current use of insulin (CMS/[facility])   • Vitamin D deficiency   • Therapeutic drug monitoring   • Routine physical examination   • Chronic diarrhea   • History of partial colectomy, 12/17/2014--ileo-cecal colectomy with primary reanastomosis.  Obstruction secondary to NSAID-induced inflamed ileum.   • Gout   •  Benign prostatic hypertrophy   • Diabetic eye exam (CMS/[facility])   • Diabetic foot exam   • Nicotine dependence, cigarettes, uncomplicated   • Medical non-compliance         Past Medical History:   Diagnosis Date   • Benign essential hypertension 4/19/2007 04/19/2007--treatment for hypertension begun.   • Benign prostatic hypertrophy 7/25/2016 07/25/2016--patient reports irritable bladder symptoms have resolved and he stopped using the Myrbetriq.  05/11/2015--patient presents with a one-month history of urinary urgency, occasional urge incontinence without dysuria. Patient has nocturia 2-3 times per night. Myrbetric 50 mg, one half by mouth daily initiated.   • Chronic anemia 2/4/2015 07/19/2016--hemoglobin slightly low at 13.3.  Hematocrit normal at 42.0.  05/11/2015--patient seen in follow-up and had a recent colonoscopy which was negative. Serum iron studies were normal. Homocystine and methylmalonic acid were normal. No further workup. Recent CBC reveals a slightly low hemoglobin but a normal hematocrit.   02/04/2015--routine CBC reveals a low hemoglobin of 11.5, hematocrit   • Chronic diarrhea 4/18/2016 07/25/2016--patient seen in follow-up and reports his diarrhea has improved but not resolved.  He reports that he WelChol did not help but he only took a maximum of 2 pills per day.  04/18/2016--patient with a history of partial colectomy performed in 2014 performed for an inflamed terminal ileum.  Part of the terminal ileum in the cecum removed.  Since that time patient presents with chronic diar   • Diabetic eye exam (CMS/[facility]) 2/3/2017    02/03/2017--patient reports he has not had a diabetic eye examination.  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  Order given.   • Diabetic foot exam 2/3/2017    02/03/2017--diabetic foot examination reveals no ulcers and no pre-ulcerative calluses.  He was left great toe and second toe have been ambulated from a lawnmower accident as a  teenager.  Sensation is intact.   • Gout 7/15/2010    07/15/2010--patient presented with complaints of left foot pain. Initial diagnosis of gout. Uric acid 8.3. Treatment begun with allopurinol.   • History of echocardiogram 12/14/2014 12/14/2014--echocardiogram reveals left ventricular chamber size is normal. Mild concentric left ventricular hypertrophy. Normal left ventricular systolic function with ejection fraction of 58%. Abnormal left ventricular diastolic function is observed. Left Atrium normal. Right ventricular cavity size is mildly enlarged. The right ventricular global systolic function is normal. Right atrium is nor   • History of Hepatitis, infectious Age 18    18 years of age--patient had infectious hepatitis. Exact type unknown.   • History of partial colectomy, 12/17/2014--ileo-cecal colectomy with primary reanastomosis. 12/17/2014 12/17/2014--patient presented with a small bowel obstruction secondary to inflamed terminal ileum. He underwent ileo-cecal colectomy with primary reanastomosis.   • History of small bowel obstruction 12/15/2014    01/19/2015--patient seen in follow-up and is doing well. No change in current medical regimen at the present time. Set up fasting lab and follow up. I explained to patient that we will need to monitor him for the development of vitamin B12 deficiency. He will certainly be at risk for this.   12/17/2014--patient presented with a small bowel obstruction secondary to inflamed terminal ileum. He under   • Hyperlipidemia 6/13/2007 06/13/2007--treatment for hyperlipidemia begun.   • Hypothyroidism 5/8/2015 05/19/2017--TSH elevated at 5.33.  Free T4 and free T3 are normal.  It appears the patient truly has hypothyroidism.  Levothyroxine 50 µg per day initiated.  01/27/2016--repeat thyroid function tests normal.  11/11/2015--TSH elevated at 5.27. Free T4 low normal at 0.92. TPO antibodies less than 6. Repeat thyroid function tests ordered.  05/08/2015--TSH  mildly elevated at 4.5. Free T4 low normal at   • Irritable bladder 5/11/2016 02/03/2017--patient reports that he is irritable bladder symptoms have returned but they seem to be intermittent.  He describes urgency and frequency.  However, he was diabetes is not under optimal control and could be playing a role.  His A1c is less than 8 at the present time.  In the past Myrbetriq seemed to be helpful and I will give patient samples to have at the time that he needs it.  He is   • Medical non-compliance 5/2/2019   • Nicotine dependence, cigarettes, uncomplicated 5/2/2019   • Nocturia 3/29/2016    02/03/2017--patient seen in follow-up and continues to have nocturia 2-3 times per night.  He has irritable bladder symptoms consisting of urgency and occasional incontinence has returned as well.  Myrbetriq samples given and patient can use that as needed.  07/25/2016--patient reports irritable bladder symptoms have resolved and he stopped using the Myrbetriq.  05/11/2015--patient presents with a   • Type 2 diabetes mellitus without complication, without long-term current use of insulin (CMS/[facility]) 3/9/2007    03/09/2007--patient presented with polyuria and polydipsia. Initial diagnosis of diabetes. Serum glucose 252, initial hemoglobin A1c 10.4.   • Vitamin D deficiency 4/12/2016         Past Surgical History:   Procedure Laterality Date   • AMPUTATION FOOT / TOE  18 years old    18 years of age--left great toe and left second toe amputation from a lawnmower accident.   • APPENDECTOMY  11 years old    11 years of age.   • CATARACT EXTRACTION Bilateral 2013 2013--patient reports he had bilateral cataract surgery about 4 years ago.  Intraocular lenses placed.   • COLECTOMY PARTIAL / TOTAL  12/17/2014 12/17/2014--patient presented with a small bowel obstruction secondary to inflamed terminal ileum. He underwent ileo-cecal colectomy with primary reanastomosis.   • COLONOSCOPY  04/06/2015 04/06/2015--colonoscopy  revealed small internal hemorrhoids. Well healed ileocolic anastomosis. Fair prep.   • EXPLORATORY LAPAROTOMY  12/17/2014 12/17/2014--patient presented with a small bowel obstruction secondary to inflamed terminal ileum. He underwent ileo-cecal colectomy with primary reanastomosis.         No Known Allergies        Current Outpatient Medications:   •  allopurinol (ZYLOPRIM) 300 MG tablet, TAKE 1 TABLET BY MOUTH ONE TIME A DAY , Disp: 30 tablet, Rfl: 6  •  atorvastatin (LIPITOR) 80 MG tablet, TAKE 1 TABLET BY MOUTH EVERY DAY FOR CHOLESTEROL, Disp: 90 tablet, Rfl: 1  •  B-D ULTRAFINE III SHORT PEN 31G X 8 MM misc, Use with Victoza injections daily., Disp: , Rfl: 3  •  Cholecalciferol (VITAMIN D3) 5000 UNITS capsule capsule, 1 by mouth daily as directed, Disp: 30 capsule, Rfl: 11  •  levothyroxine (SYNTHROID, LEVOTHROID) 50 MCG tablet, TAKE 1 TABLET BY MOUTH ONE TIME A DAY for low thyroid , Disp: 30 tablet, Rfl: 5  •  Liraglutide (Victoza) 18 MG/3ML solution pen-injector injection, Inject 1.8 mg subcutaneously daily as directed for diabetes, Disp: 9 pen, Rfl: 3  •  metoprolol tartrate (LOPRESSOR) 25 MG tablet, TAKE 1 TABLET BY MOUTH TWO TIMES A DAY for high blood pressure, Disp: 60 tablet, Rfl: 6  •  SITagliptin-metFORMIN HCl ER (JANUMET XR)  MG tablet, Take 2 p.o. daily for diabetes, Disp: 60 tablet, Rfl: 5      Family History   Problem Relation Age of Onset   • Scoliosis Mother         Amyotrophic Lateral Sclerosis   • Stroke Father         Father had multiple strokes.   • Diabetes Father         Type 2   • Diabetes Paternal Grandfather         Type 2         Social History     Socioeconomic History   • Marital status:      Spouse name: Not on file   • Number of children: Not on file   • Years of education: 14   • Highest education level: Not on file   Tobacco Use   • Smoking status: Current Every Day Smoker     Packs/day: 1.00     Types: Cigarettes   • Smokeless tobacco: Never Used   Vaping Use   •  "Vaping Use: Never used   Substance and Sexual Activity   • Alcohol use: Yes     Comment: Minimum consumption   • Drug use: No   • Sexual activity: Yes     Partners: Female         Vitals:    03/30/21 1419   BP: 142/70   BP Location: Left arm   Pulse: 53   SpO2: 98%   Weight: 114 kg (252 lb)   Height: 190.5 cm (75\")        Body mass index is 31.5 kg/m².      Physical Exam:    General: Alert and oriented x 3.  No acute distress.  Normal affect.  Obese.  HEENT: Pupils equal, round, reactive to light; extraocular movements intact; sclerae nonicteric; pharynx, ear canals and TMs normal.  Neck: Without JVD, thyromegaly, bruit, or adenopathy.  Lungs: Clear to auscultation in all fields.  Heart: Regular rate and rhythm without murmur, rub, gallop, or click.  Abdomen: Soft, nontender, without hepatosplenomegaly or hernia.  Bowel sounds normal.  : Deferred.  Rectal: Deferred.  Extremities: Without clubbing, cyanosis, edema, or pulse deficit.  Neurologic: Intact without focal deficit.  Normal station and gait observed during ingress and egress from the examination room.  Skin: Without significant lesion.  Musculoskeletal: Unremarkable.    Lab/other results:    NMR is absolutely perfect.  CMP normal except glucose 172.  Hemoglobin A1c 7.79.  Vitamin D slightly low at 29.4.  Thyroid function test normal.  Uric acid normal at 5.3.  CBC normal except hemoglobin low at 12.4.  However, hematocrit is normal at 37.7.  CPK is normal.    Assessment/Plan:     Diagnosis Plan   1. Type 2 diabetes mellitus without complication, without long-term current use of insulin (CMS/[facility])  Comprehensive Metabolic Panel    Hemoglobin A1c    Microalbumin / Creatinine Urine Ratio - Urine, Clean Catch   2. Hyperlipidemia  CK    Comprehensive Metabolic Panel    NMR LipoProfile   3. Primary hypothyroidism  TSH    T4, Free    T3, Free   4. Benign essential hypertension     5. Gout  Uric Acid   6. Benign prostatic hypertrophy  PSA DIAGNOSTIC   7. Irritable " bladder     8. Nocturia     9. Chronic diarrhea     10. History of partial colectomy, 12/17/2014--ileo-cecal colectomy with primary reanastomosis.  Obstruction secondary to NSAID-induced inflamed ileum.     11. Therapeutic drug monitoring  CBC (No Diff)   12. Routine physical examination  CBC (No Diff)    CK    Comprehensive Metabolic Panel    Hemoglobin A1c    Microalbumin / Creatinine Urine Ratio - Urine, Clean Catch    NMR LipoProfile    Vitamin D 25 Hydroxy    Uric Acid    TSH    T4, Free    T3, Free    PSA DIAGNOSTIC   13. Vitamin D deficiency  Vitamin D 25 Hydroxy     Patient has type 2 diabetes is under reasonable control.  Strongly recommend low carbohydrate diet, exercise, and weight loss.  Hyperlipidemia is under excellent control with 40 mg of Lipitor.  His blood pressure slightly elevated today but not enough to make any changes.  Patient has gout and takes allopurinol which he tolerates well.  He has BPH and irritable bladder with nocturia and his symptoms are currently tolerable.  He also has chronic diarrhea that began after his partial colectomy.  This is tolerable as well.    Plan is as follows: No change in current medical regimen.  I will have patient follow-up in 4 months with lab prior for his annual physical or follow-up as needed.  I have recommended that he get vascular screen.      Procedures

## 2021-04-15 ENCOUNTER — TELEPHONE (OUTPATIENT)
Dept: INTERNAL MEDICINE | Facility: CLINIC | Age: 66
End: 2021-04-15

## 2021-06-01 DIAGNOSIS — E11.9 TYPE 2 DIABETES MELLITUS WITHOUT COMPLICATION, WITHOUT LONG-TERM CURRENT USE OF INSULIN (HCC): Chronic | ICD-10-CM

## 2021-06-01 NOTE — TELEPHONE ENCOUNTER
Caller: Navarro Bruno    Relationship: Self    Best call back number: [phone]    Medication needed:   Requested Prescriptions     Pending Prescriptions Disp Refills   • Liraglutide (Victoza) 18 MG/3ML solution pen-injector injection 9 pen 3     Sig: Inject 1.8 mg subcutaneously daily as directed for diabetes       When do you need the refill by:     What additional details did the patient provide when requesting the medication: HAS BEEN OUT OF THIS MEDICATION FOR AWHILE NOW. PATIENT STATES THAT THIS MEDICATION COMES TO THE OFFICE AND THEN HE PICKS IT UP.     Does the patient have less than a 3 day supply:  [x] Yes  [] No    What is the patient's preferred pharmacy:  [facility]  [address] [place] [address]  [phone]

## 2021-06-02 RX ORDER — LIRAGLUTIDE 6 MG/ML
INJECTION SUBCUTANEOUS
Qty: 9 PEN | Refills: 3 | Status: SHIPPED | OUTPATIENT
Start: 2021-06-02 | End: 2021-08-03

## 2021-07-20 DIAGNOSIS — E03.9 HYPOTHYROIDISM, UNSPECIFIED TYPE: Chronic | ICD-10-CM

## 2021-07-20 RX ORDER — LEVOTHYROXINE SODIUM 0.05 MG/1
TABLET ORAL
Qty: 30 TABLET | Refills: 1 | Status: SHIPPED | OUTPATIENT
Start: 2021-07-20 | End: 2021-10-08

## 2021-07-26 ENCOUNTER — TELEPHONE (OUTPATIENT)
Dept: INTERNAL MEDICINE | Facility: CLINIC | Age: 66
End: 2021-07-26

## 2021-07-27 ENCOUNTER — LAB (OUTPATIENT)
Dept: LAB | Facility: HOSPITAL | Age: 66
End: 2021-07-27

## 2021-07-27 DIAGNOSIS — Z51.81 THERAPEUTIC DRUG MONITORING: ICD-10-CM

## 2021-07-27 DIAGNOSIS — E11.9 TYPE 2 DIABETES MELLITUS WITHOUT COMPLICATION, WITHOUT LONG-TERM CURRENT USE OF INSULIN (HCC): Chronic | ICD-10-CM

## 2021-07-27 DIAGNOSIS — Z87.39 HISTORY OF GOUT: Chronic | ICD-10-CM

## 2021-07-27 DIAGNOSIS — N40.1 BENIGN NON-NODULAR PROSTATIC HYPERPLASIA WITH LOWER URINARY TRACT SYMPTOMS: Chronic | ICD-10-CM

## 2021-07-27 DIAGNOSIS — E03.9 PRIMARY HYPOTHYROIDISM: Chronic | ICD-10-CM

## 2021-07-27 DIAGNOSIS — E55.9 VITAMIN D DEFICIENCY: ICD-10-CM

## 2021-07-27 DIAGNOSIS — E78.2 MIXED HYPERLIPIDEMIA: Chronic | ICD-10-CM

## 2021-07-27 DIAGNOSIS — Z00.00 ROUTINE PHYSICAL EXAMINATION: ICD-10-CM

## 2021-07-27 LAB
25(OH)D3 SERPL-MCNC: 37.9 NG/ML (ref 30–100)
ALBUMIN SERPL-MCNC: 4.1 G/DL (ref 3.5–5.2)
ALBUMIN UR-MCNC: 15.3 MG/DL
ALBUMIN/GLOB SERPL: 1.3 G/DL
ALP SERPL-CCNC: 102 U/L (ref 39–117)
ALT SERPL W P-5'-P-CCNC: 7 U/L (ref 1–41)
ANION GAP SERPL CALCULATED.3IONS-SCNC: 11.6 MMOL/L (ref 5–15)
AST SERPL-CCNC: 12 U/L (ref 1–40)
BILIRUB SERPL-MCNC: 0.3 MG/DL (ref 0–1.2)
BUN SERPL-MCNC: 14 MG/DL (ref 8–23)
BUN/CREAT SERPL: 15.6 (ref 7–25)
CALCIUM SPEC-SCNC: 10 MG/DL (ref 8.6–10.5)
CHLORIDE SERPL-SCNC: 101 MMOL/L (ref 98–107)
CK SERPL-CCNC: 80 U/L (ref 20–200)
CO2 SERPL-SCNC: 25.4 MMOL/L (ref 22–29)
CREAT SERPL-MCNC: 0.9 MG/DL (ref 0.76–1.27)
CREAT UR-MCNC: 118.2 MG/DL
DEPRECATED RDW RBC AUTO: 43 FL (ref 37–54)
ERYTHROCYTE [DISTWIDTH] IN BLOOD BY AUTOMATED COUNT: 13.4 % (ref 12.3–15.4)
GFR SERPL CREATININE-BSD FRML MDRD: 84 ML/MIN/1.73
GLOBULIN UR ELPH-MCNC: 3.1 GM/DL
GLUCOSE SERPL-MCNC: 154 MG/DL (ref 65–99)
HBA1C MFR BLD: 7.8 % (ref 4.8–5.6)
HCT VFR BLD AUTO: 41.5 % (ref 37.5–51)
HGB BLD-MCNC: 13.3 G/DL (ref 13–17.7)
MCH RBC QN AUTO: 28 PG (ref 26.6–33)
MCHC RBC AUTO-ENTMCNC: 32 G/DL (ref 31.5–35.7)
MCV RBC AUTO: 87.4 FL (ref 79–97)
MICROALBUMIN/CREAT UR: 129.4 MG/G
PLATELET # BLD AUTO: 281 10*3/MM3 (ref 140–450)
PMV BLD AUTO: 10.2 FL (ref 6–12)
POTASSIUM SERPL-SCNC: 4.5 MMOL/L (ref 3.5–5.2)
PROT SERPL-MCNC: 7.2 G/DL (ref 6–8.5)
PSA SERPL-MCNC: 0.94 NG/ML (ref 0–4)
RBC # BLD AUTO: 4.75 10*6/MM3 (ref 4.14–5.8)
SODIUM SERPL-SCNC: 138 MMOL/L (ref 136–145)
T3FREE SERPL-MCNC: 2.58 PG/ML (ref 2–4.4)
T4 FREE SERPL-MCNC: 1.04 NG/DL (ref 0.93–1.7)
TSH SERPL DL<=0.05 MIU/L-ACNC: 2.22 UIU/ML (ref 0.27–4.2)
URATE SERPL-MCNC: 3.8 MG/DL (ref 3.4–7)
WBC # BLD AUTO: 10.53 10*3/MM3 (ref 3.4–10.8)

## 2021-07-27 PROCEDURE — 85027 COMPLETE CBC AUTOMATED: CPT

## 2021-07-27 PROCEDURE — 83704 LIPOPROTEIN BLD QUAN PART: CPT

## 2021-07-27 PROCEDURE — 84481 FREE ASSAY (FT-3): CPT

## 2021-07-27 PROCEDURE — 82306 VITAMIN D 25 HYDROXY: CPT

## 2021-07-27 PROCEDURE — 80061 LIPID PANEL: CPT

## 2021-07-27 PROCEDURE — 84443 ASSAY THYROID STIM HORMONE: CPT

## 2021-07-27 PROCEDURE — 36415 COLL VENOUS BLD VENIPUNCTURE: CPT

## 2021-07-27 PROCEDURE — 80053 COMPREHEN METABOLIC PANEL: CPT

## 2021-07-27 PROCEDURE — 83036 HEMOGLOBIN GLYCOSYLATED A1C: CPT

## 2021-07-27 PROCEDURE — 82570 ASSAY OF URINE CREATININE: CPT

## 2021-07-27 PROCEDURE — 82043 UR ALBUMIN QUANTITATIVE: CPT

## 2021-07-27 PROCEDURE — 84153 ASSAY OF PSA TOTAL: CPT

## 2021-07-27 PROCEDURE — 82550 ASSAY OF CK (CPK): CPT

## 2021-07-27 PROCEDURE — 84439 ASSAY OF FREE THYROXINE: CPT

## 2021-07-27 PROCEDURE — 84550 ASSAY OF BLOOD/URIC ACID: CPT

## 2021-07-29 LAB
CHOLEST SERPL-MCNC: 122 MG/DL (ref 100–199)
HDL SERPL-SCNC: 31.2 UMOL/L
HDLC SERPL-MCNC: 44 MG/DL
LDL SERPL QN: 19.7 NM
LDL SERPL-SCNC: 697 NMOL/L
LDL SMALL SERPL-SCNC: 505 NMOL/L
LDLC SERPL CALC-MCNC: 50 MG/DL (ref 0–99)
TRIGL SERPL-MCNC: 170 MG/DL (ref 0–149)

## 2021-08-03 ENCOUNTER — TELEPHONE (OUTPATIENT)
Dept: INTERNAL MEDICINE | Facility: CLINIC | Age: 66
End: 2021-08-03

## 2021-08-03 ENCOUNTER — OFFICE VISIT (OUTPATIENT)
Dept: INTERNAL MEDICINE | Facility: CLINIC | Age: 66
End: 2021-08-03

## 2021-08-03 VITALS
DIASTOLIC BLOOD PRESSURE: 72 MMHG | BODY MASS INDEX: 30.56 KG/M2 | OXYGEN SATURATION: 95 % | HEIGHT: 75 IN | WEIGHT: 245.8 LBS | SYSTOLIC BLOOD PRESSURE: 136 MMHG | HEART RATE: 67 BPM | RESPIRATION RATE: 16 BRPM

## 2021-08-03 DIAGNOSIS — R35.1 NOCTURIA: Chronic | ICD-10-CM

## 2021-08-03 DIAGNOSIS — E11.9 ENCOUNTER FOR DIABETIC FOOT EXAM (HCC): Chronic | ICD-10-CM

## 2021-08-03 DIAGNOSIS — Z87.39 HISTORY OF GOUT: Chronic | ICD-10-CM

## 2021-08-03 DIAGNOSIS — I10 BENIGN ESSENTIAL HYPERTENSION: Chronic | ICD-10-CM

## 2021-08-03 DIAGNOSIS — E11.9 TYPE 2 DIABETES MELLITUS WITHOUT COMPLICATION, WITHOUT LONG-TERM CURRENT USE OF INSULIN (HCC): Chronic | ICD-10-CM

## 2021-08-03 DIAGNOSIS — E55.9 VITAMIN D DEFICIENCY: Chronic | ICD-10-CM

## 2021-08-03 DIAGNOSIS — R80.9 MICROALBUMINURIA: ICD-10-CM

## 2021-08-03 DIAGNOSIS — Z51.81 THERAPEUTIC DRUG MONITORING: ICD-10-CM

## 2021-08-03 DIAGNOSIS — D64.9 CHRONIC ANEMIA: Chronic | ICD-10-CM

## 2021-08-03 DIAGNOSIS — E03.9 PRIMARY HYPOTHYROIDISM: Chronic | ICD-10-CM

## 2021-08-03 DIAGNOSIS — N40.1 BENIGN NON-NODULAR PROSTATIC HYPERPLASIA WITH LOWER URINARY TRACT SYMPTOMS: Chronic | ICD-10-CM

## 2021-08-03 DIAGNOSIS — Z90.49 HISTORY OF PARTIAL COLECTOMY: Chronic | ICD-10-CM

## 2021-08-03 DIAGNOSIS — E66.9 NON MORBID OBESITY: ICD-10-CM

## 2021-08-03 DIAGNOSIS — K52.9 CHRONIC DIARRHEA: Chronic | ICD-10-CM

## 2021-08-03 DIAGNOSIS — N32.89 IRRITABLE BLADDER: Chronic | ICD-10-CM

## 2021-08-03 DIAGNOSIS — R15.2 FECAL URGENCY: ICD-10-CM

## 2021-08-03 DIAGNOSIS — F17.210 NICOTINE DEPENDENCE, CIGARETTES, UNCOMPLICATED: Chronic | ICD-10-CM

## 2021-08-03 DIAGNOSIS — R19.4 CHANGE IN BOWEL HABITS: ICD-10-CM

## 2021-08-03 DIAGNOSIS — Z00.00 ROUTINE PHYSICAL EXAMINATION: Primary | ICD-10-CM

## 2021-08-03 DIAGNOSIS — E78.2 MIXED HYPERLIPIDEMIA: Chronic | ICD-10-CM

## 2021-08-03 PROCEDURE — 99214 OFFICE O/P EST MOD 30 MIN: CPT | Performed by: INTERNAL MEDICINE

## 2021-08-03 PROCEDURE — 99397 PER PM REEVAL EST PAT 65+ YR: CPT | Performed by: INTERNAL MEDICINE

## 2021-08-03 RX ORDER — ORAL SEMAGLUTIDE 7 MG/1
7 TABLET ORAL DAILY
Qty: 90 TABLET | Refills: 3 | Status: SHIPPED | OUTPATIENT
Start: 2021-08-03 | End: 2021-09-03 | Stop reason: SDUPTHER

## 2021-08-03 NOTE — TELEPHONE ENCOUNTER
Caller: Navarro Bruno    Relationship: Self    Best call back number:[phone]    What medications are you currently taking:   Current Outpatient Medications on File Prior to Visit   Medication Sig Dispense Refill   • allopurinol (ZYLOPRIM) 300 MG tablet TAKE 1 TABLET BY MOUTH ONE TIME A DAY  30 tablet 6   • atorvastatin (LIPITOR) 80 MG tablet TAKE 1 TABLET BY MOUTH EVERY DAY FOR CHOLESTEROL 90 tablet 1   • B-D ULTRAFINE III SHORT PEN 31G X 8 MM misc Use with Victoza injections daily.  3   • Cholecalciferol (VITAMIN D3) 5000 UNITS capsule capsule 1 by mouth daily as directed 30 capsule 11   • levothyroxine (SYNTHROID, LEVOTHROID) 50 MCG tablet TAKE 1 TABLET BY MOUTH EVERY DAY for low thyroid 30 tablet 1   • metoprolol tartrate (LOPRESSOR) 25 MG tablet TAKE 1 TABLET BY MOUTH TWO TIMES A DAY for high blood pressure 60 tablet 6   • Semaglutide (Rybelsus) 7 MG tablet Take 7 mg by mouth Daily. For diabetes 90 tablet 3   • SITagliptin-metFORMIN HCl ER (JANUMET XR)  MG tablet Take 2 p.o. daily for diabetes 60 tablet 5   • [DISCONTINUED] Liraglutide (Victoza) 18 MG/3ML solution pen-injector injection Inject 1.8 mg subcutaneously daily as directed for diabetes 9 pen 3     No current facility-administered medications on file prior to visit.        Which medication are you concerned about: Semaglutide (Rybelsus) 7 MG tablet    Who prescribed you this medication: DR BRANHAM    What are your concerns: PATIENT STATED THAT A PRIOR AUTHORIZATION IS NEEDED ON THE MEDICATION. HE STATED THEY GAVE HIM THIS NUMBER FOR THE PROVIDER TO CALL: [phone]

## 2021-08-03 NOTE — PROGRESS NOTES
08/03/2021    Patient Information  Navarro Bruno                                                                                          [address] [facility] [address]      1955  [unfilled]  There is no work phone number on file.    Chief Complaint:     Routine annual physical examination and follow-up blood work.  No new acute complaints.    History of Present Illness:    Patient with a history of type 2 diabetes, hyperlipidemia, hypothyroidism, hypertension, chronic anemia, BPH, gout, irritable bladder, nocturia, cigarette smoker, history of partial colectomy due to obstruction from NSAID induced inflamed ileum, chronic diarrhea, vitamin D deficiency.  He presents today for his routine annual physical exam and follow-up blood work.  His past medical history reviewed and updated were necessary including health maintenance parameters.  This reveals he will be up-to-date or else accounted for after today's visit with the exception of needing his diabetic eye exam and a AAA screen/vascular screen which I have encouraged him both today and in the past to get.    Review of Systems   Constitutional: Negative.   HENT: Negative.    Eyes: Negative.    Cardiovascular: Negative.    Respiratory: Negative.    Endocrine: Negative.    Hematologic/Lymphatic: Negative.    Skin: Negative.    Musculoskeletal: Negative.    Gastrointestinal: Negative.    Genitourinary: Negative.    Neurological: Negative.    Psychiatric/Behavioral: Negative.    Allergic/Immunologic: Negative.        Active Problems:    Patient Active Problem List   Diagnosis   • Chronic anemia   • Benign essential hypertension   • Hyperlipidemia   • Primary hypothyroidism   • Irritable bladder   • Nocturia   • Type 2 diabetes mellitus without complication, without long-term current use of insulin (CMS/[facility])   • Vitamin D deficiency   • Therapeutic drug monitoring   • Routine physical examination   • Chronic diarrhea   • History of  partial colectomy, 12/17/2014--ileo-cecal colectomy with primary reanastomosis.  Obstruction secondary to NSAID-induced inflamed ileum.   • Gout   • Benign prostatic hypertrophy   • Diabetic eye exam (CMS/[facility])   • Diabetic foot exam   • Nicotine dependence, cigarettes, uncomplicated   • Medical non-compliance   • Non morbid obesity   • Microalbuminuria   • Change in bowel habits   • Fecal urgency         Past Medical History:   Diagnosis Date   • Benign essential hypertension 4/19/2007 04/19/2007--treatment for hypertension begun.   • Benign prostatic hypertrophy 7/25/2016 07/25/2016--patient reports irritable bladder symptoms have resolved and he stopped using the Myrbetriq.  05/11/2015--patient presents with a one-month history of urinary urgency, occasional urge incontinence without dysuria. Patient has nocturia 2-3 times per night. Myrbetric 50 mg, one half by mouth daily initiated.   • Chronic anemia 2/4/2015 07/19/2016--hemoglobin slightly low at 13.3.  Hematocrit normal at 42.0.  05/11/2015--patient seen in follow-up and had a recent colonoscopy which was negative. Serum iron studies were normal. Homocystine and methylmalonic acid were normal. No further workup. Recent CBC reveals a slightly low hemoglobin but a normal hematocrit.   02/04/2015--routine CBC reveals a low hemoglobin of 11.5, hematocrit   • Chronic diarrhea 4/18/2016 07/25/2016--patient seen in follow-up and reports his diarrhea has improved but not resolved.  He reports that he WelChol did not help but he only took a maximum of 2 pills per day.  04/18/2016--patient with a history of partial colectomy performed in 2014 performed for an inflamed terminal ileum.  Part of the terminal ileum in the cecum removed.  Since that time patient presents with chronic diar   • Diabetic eye exam (CMS/[facility]) 2/3/2017    02/03/2017--patient reports he has not had a diabetic eye examination.  He gets his vision tested when he has his driving test but  that is all.  Order given.   • Diabetic foot exam 2/3/2017    02/03/2017--diabetic foot examination reveals no ulcers and no pre-ulcerative calluses.  He was left great toe and second toe have been ambulated from a lawnmower accident as a teenager.  Sensation is intact.   • Gout 7/15/2010    07/15/2010--patient presented with complaints of left foot pain. Initial diagnosis of gout. Uric acid 8.3. Treatment begun with allopurinol.   • History of echocardiogram 12/14/2014 12/14/2014--echocardiogram reveals left ventricular chamber size is normal. Mild concentric left ventricular hypertrophy. Normal left ventricular systolic function with ejection fraction of 58%. Abnormal left ventricular diastolic function is observed. Left Atrium normal. Right ventricular cavity size is mildly enlarged. The right ventricular global systolic function is normal. Right atrium is nor   • History of Hepatitis, infectious Age 18    18 years of age--patient had infectious hepatitis. Exact type unknown.   • History of partial colectomy, 12/17/2014--ileo-cecal colectomy with primary reanastomosis. 12/17/2014 12/17/2014--patient presented with a small bowel obstruction secondary to inflamed terminal ileum. He underwent ileo-cecal colectomy with primary reanastomosis.   • History of small bowel obstruction 12/15/2014    01/19/2015--patient seen in follow-up and is doing well. No change in current medical regimen at the present time. Set up fasting lab and follow up. I explained to patient that we will need to monitor him for the development of vitamin B12 deficiency. He will certainly be at risk for this.   12/17/2014--patient presented with a small bowel obstruction secondary to inflamed terminal ileum. He under   • Hyperlipidemia 6/13/2007 06/13/2007--treatment for hyperlipidemia begun.   • Hypothyroidism 5/8/2015 05/19/2017--TSH elevated at 5.33.  Free T4 and free T3 are normal.  It appears the patient truly has hypothyroidism.   Levothyroxine 50 µg per day initiated.  01/27/2016--repeat thyroid function tests normal.  11/11/2015--TSH elevated at 5.27. Free T4 low normal at 0.92. TPO antibodies less than 6. Repeat thyroid function tests ordered.  05/08/2015--TSH mildly elevated at 4.5. Free T4 low normal at   • Irritable bladder 5/11/2016 02/03/2017--patient reports that he is irritable bladder symptoms have returned but they seem to be intermittent.  He describes urgency and frequency.  However, he was diabetes is not under optimal control and could be playing a role.  His A1c is less than 8 at the present time.  In the past Myrbetriq seemed to be helpful and I will give patient samples to have at the time that he needs it.  He is   • Medical non-compliance 5/2/2019   • Microalbuminuria 8/3/2021    August 3, 2021--routine follow-up.  Patient is diabetic and his urine microalbumin/creatinine ratio returned elevated at 129.4.  May need low-dose ACE inhibitor.   • Nicotine dependence, cigarettes, uncomplicated 5/2/2019   • Nocturia 3/29/2016    02/03/2017--patient seen in follow-up and continues to have nocturia 2-3 times per night.  He has irritable bladder symptoms consisting of urgency and occasional incontinence has returned as well.  Myrbetriq samples given and patient can use that as needed.  07/25/2016--patient reports irritable bladder symptoms have resolved and he stopped using the Myrbetriq.  05/11/2015--patient presents with a   • Non morbid obesity 8/3/2021   • Type 2 diabetes mellitus without complication, without long-term current use of insulin (CMS/[facility]) 3/9/2007    03/09/2007--patient presented with polyuria and polydipsia. Initial diagnosis of diabetes. Serum glucose 252, initial hemoglobin A1c 10.4.   • Vitamin D deficiency 4/12/2016         Past Surgical History:   Procedure Laterality Date   • AMPUTATION FOOT / TOE  18 years old    18 years of age--left great toe and left second toe amputation from a lawnmower accident.    • APPENDECTOMY  11 years old    11 years of age.   • CATARACT EXTRACTION Bilateral 2013 2013--patient reports he had bilateral cataract surgery about 4 years ago.  Intraocular lenses placed.   • COLECTOMY PARTIAL / TOTAL  12/17/2014 12/17/2014--patient presented with a small bowel obstruction secondary to inflamed terminal ileum. He underwent ileo-cecal colectomy with primary reanastomosis.   • COLONOSCOPY  04/06/2015 04/06/2015--colonoscopy revealed small internal hemorrhoids. Well healed ileocolic anastomosis. Fair prep.   • EXPLORATORY LAPAROTOMY  12/17/2014 12/17/2014--patient presented with a small bowel obstruction secondary to inflamed terminal ileum. He underwent ileo-cecal colectomy with primary reanastomosis.         No Known Allergies        Current Outpatient Medications:   •  allopurinol (ZYLOPRIM) 300 MG tablet, TAKE 1 TABLET BY MOUTH ONE TIME A DAY , Disp: 30 tablet, Rfl: 6  •  atorvastatin (LIPITOR) 80 MG tablet, TAKE 1 TABLET BY MOUTH EVERY DAY FOR CHOLESTEROL, Disp: 90 tablet, Rfl: 1  •  B-D ULTRAFINE III SHORT PEN 31G X 8 MM misc, Use with Victoza injections daily., Disp: , Rfl: 3  •  Cholecalciferol (VITAMIN D3) 5000 UNITS capsule capsule, 1 by mouth daily as directed, Disp: 30 capsule, Rfl: 11  •  levothyroxine (SYNTHROID, LEVOTHROID) 50 MCG tablet, TAKE 1 TABLET BY MOUTH EVERY DAY for low thyroid, Disp: 30 tablet, Rfl: 1  •  metoprolol tartrate (LOPRESSOR) 25 MG tablet, TAKE 1 TABLET BY MOUTH TWO TIMES A DAY for high blood pressure, Disp: 60 tablet, Rfl: 6  •  Semaglutide (Rybelsus) 7 MG tablet, Take 7 mg by mouth Daily. For diabetes, Disp: 90 tablet, Rfl: 3  •  SITagliptin-metFORMIN HCl ER (JANUMET XR)  MG tablet, Take 2 p.o. daily for diabetes, Disp: 60 tablet, Rfl: 5      Family History   Problem Relation Age of Onset   • Scoliosis Mother         Amyotrophic Lateral Sclerosis   • Stroke Father         Father had multiple strokes.   • Diabetes Father         Type 2   •  "Diabetes Paternal Grandfather         Type 2         Social History     Socioeconomic History   • Marital status:      Spouse name: Not on file   • Number of children: Not on file   • Years of education: 14   • Highest education level: Not on file   Tobacco Use   • Smoking status: Current Every Day Smoker     Packs/day: 1.00     Types: Cigarettes   • Smokeless tobacco: Never Used   Vaping Use   • Vaping Use: Never used   Substance and Sexual Activity   • Alcohol use: Yes     Comment: Minimum consumption   • Drug use: No   • Sexual activity: Yes     Partners: Female         Vitals:    08/03/21 0829   BP: 136/72   Pulse: 67   Resp: 16   SpO2: 95%   Weight: 111 kg (245 lb 12.8 oz)   Height: 190.5 cm (75\")        Body mass index is 30.72 kg/m².      Physical Exam:    General: Alert and oriented x 3.  No acute distress.  Obese.  Normal affect.  HEENT: Pupils equal, round, reactive to light; extraocular movements intact; sclerae nonicteric; pharynx, ear canals and TMs normal.  Neck: Without JVD, thyromegaly, bruit, or adenopathy.  Lungs: Clear to auscultation in all fields.  Heart: Regular rate and rhythm without murmur, rub, gallop, or click.  Abdomen: Soft, nontender, without hepatosplenomegaly or hernia.  Bowel sounds normal.  : Deferred.  Rectal: Deferred.  Extremities: Without clubbing, cyanosis, edema, or pulse deficit.  Neurologic: Intact without focal deficit.  Normal station and gait observed during ingress and egress from the examination room.  Skin: Without significant lesion.  Musculoskeletal: Unremarkable.    Lab/other results:    NMR reveals a total cholesterol of 122.  Triglycerides mildly elevated 170.  LDL particle number excellent at 697.  Small LDL particle number excellent at 505.  HDL particle number normal at 31.2.  CMP normal except glucose 154.  Hemoglobin A1c 7.8.  Microalbumin/creatinine ratio elevated at 129.4.  Vitamin D normal at 37.9.  Uric acid normal at 3.8.  Thyroid function test " normal.  PSA normal at 0.941.  CPK normal.  CBC normal.    Assessment/Plan:     Diagnosis Plan   1. Routine physical examination     2. Type 2 diabetes mellitus without complication, without long-term current use of insulin (CMS/[facility])  Comprehensive Metabolic Panel    Hemoglobin A1c    Semaglutide (Rybelsus) 7 MG tablet   3. Hyperlipidemia  CK    Comprehensive Metabolic Panel    NMR LipoProfile   4. Primary hypothyroidism  TSH    T4, Free    T3, Free   5. Benign essential hypertension     6. Chronic anemia  CBC (No Diff)   7. Benign prostatic hypertrophy     8. Gout  Uric Acid   9. Irritable bladder     10. Nocturia     11. Diabetic foot exam     12. Nicotine dependence, cigarettes, uncomplicated     13. History of partial colectomy, 12/17/2014--ileo-cecal colectomy with primary reanastomosis.  Obstruction secondary to NSAID-induced inflamed ileum.  Ambulatory Referral For Screening Colonoscopy   14. Chronic diarrhea     15. Vitamin D deficiency  Vitamin D 25 Hydroxy   16. Non morbid obesity     17. Microalbuminuria  Microalbumin / Creatinine Urine Ratio - Urine, Clean Catch   18. Therapeutic drug monitoring     19. Change in bowel habits  Ambulatory Referral For Screening Colonoscopy   20. Fecal urgency       Patient presents with essentially normal annual physical except for the following issues: He has type 2 diabetes which is under reasonable control.  Patient does have nonmorbid obesity and is actually lost some weight since the last visit.  I encouraged him to follow a low carbohydrate diet and continue to lose weight.  Hyperlipidemia is under reasonable control with 80 mg of Lipitor.  His thyroid is therapeutic with 50 mcg of levothyroxine.  Blood pressure appears to be well controlled on metoprolol.  Chronic anemia appears to have resolved but we will continue to monitor.  Patient does have BPH and irritable bladder symptoms with nocturia but his symptoms are tolerable.  Gout is stable on allopurinol with  good uric acid levels.  Patient continues to smoke cigarettes and I have encouraged him to quit smoking both today and many times in the past.  Chronic diarrhea currently not an issue.  Vitamin D is in the normal range.  Patient does have microalbuminuria which we will continue to monitor.  We will add this to the problem list.    Several preventative health issues discussed including review of vaccinations and recommendations, including dietary issues, exercise and weight loss.  Safe sex practices discussed.  Patient advised to wear seatbelt whenever driving and avoid texting and driving.  Also advised to look both ways before crossing the street.  Colon cancer prevention discussed and is up-to-date with colonoscopy.  Advised to avoid tobacco products and minimize alcohol consumption.    Plan is as follows: Continue with low carbohydrate diet and weight loss efforts.  No changes in current medical regimen.  Patient will follow-up in 4 months with lab prior or follow-up as needed.  Once again, recommend diabetic eye exam and vascular screening test.    Addendum: August 3, 2021--patient with a previous history of partial colectomy due to NSAID induced inflammation presents with a 1 week history of obstipation.  He reports loose stool/diarrhea and has been taking over-the-counter medications including antidiarrheal medications and probiotic.  However, he now has urgency and feels like he needs to defecate but cannot.  He is concerned about return of a blockage.  He also has hemorrhoids that are bothering him.  Patient referred ASAP for repeat colonoscopy.    Procedures

## 2021-08-10 ENCOUNTER — TELEPHONE (OUTPATIENT)
Dept: INTERNAL MEDICINE | Facility: CLINIC | Age: 66
End: 2021-08-10

## 2021-08-10 NOTE — TELEPHONE ENCOUNTER
THE PATIENT HAS BEEN TAKING THE Semaglutide (Rybelsus) 7 MG tablet MEDICATION. THE PATIENT DOES NOT KNOW IF HE IS SUPPOSED TO BE TAKING THE 7 MG  OF THIS TABLET, OR IF HE NEEDS TO TAKE 30 LIKE THE PHARMACY REPRESENTATIVE RECOMMENDED. PLEASE ADVISE BY CALLING [phone].

## 2021-08-10 NOTE — TELEPHONE ENCOUNTER
Patient needs to take 3 mg of the Rybelsus daily for 30 days and then he needs to increase it to 7 mg/day and stay at that dose.

## 2021-08-11 ENCOUNTER — OFFICE VISIT (OUTPATIENT)
Dept: SURGERY | Facility: CLINIC | Age: 66
End: 2021-08-11

## 2021-08-11 VITALS — BODY MASS INDEX: 30.61 KG/M2 | HEIGHT: 75 IN | WEIGHT: 246.2 LBS

## 2021-08-11 DIAGNOSIS — K59.00 CONSTIPATION, UNSPECIFIED CONSTIPATION TYPE: Primary | ICD-10-CM

## 2021-08-11 PROCEDURE — 99203 OFFICE O/P NEW LOW 30 MIN: CPT | Performed by: SURGERY

## 2021-08-11 RX ORDER — AMOXICILLIN 250 MG
2 CAPSULE ORAL NIGHTLY
Status: DISCONTINUED | OUTPATIENT
Start: 2021-08-11 | End: 2021-12-20

## 2021-08-11 NOTE — H&P (VIEW-ONLY)
Chief Complaint   Patient presents with   • Bowel Changes   • Colonoscopy       Subjective     Navarro Bruno is a 66 y.o. male here for evaluation of constipation.  The patient reports that 2 weeks ago he developed severe constipation was unable to have a bowel movement.  Lasted for several days.  He felt pain in his pelvis and was unable to urinate.  He eventually started having bowel movements and urinating.  Since then he has been having bowel movements every day.  He reports having hemorrhoids and intermittent episodes of rectal bleeding.  Reports having colonoscopy in 2015 where he had poor bowel preparation.  He has history of right hemicolectomy on 12/17/2014 for obstruction at the level of the terminal ileum and was thought to be related to chronic NSAIDs use.  Patient denies taking chronic NSAIDs anymore.  He continues to smoke.  He does not drink alcohol as he used to.  He used to be a heavy alcohol user.  Denies any weight loss.  Has history of chronic diarrhea treated with WelChol in the past.    Past Medical History:   Diagnosis Date   • Benign essential hypertension 4/19/2007 04/19/2007--treatment for hypertension begun.   • Benign prostatic hypertrophy 7/25/2016 07/25/2016--patient reports irritable bladder symptoms have resolved and he stopped using the Myrbetriq.  05/11/2015--patient presents with a one-month history of urinary urgency, occasional urge incontinence without dysuria. Patient has nocturia 2-3 times per night. Myrbetric 50 mg, one half by mouth daily initiated.   • Chronic anemia 2/4/2015 07/19/2016--hemoglobin slightly low at 13.3.  Hematocrit normal at 42.0.  05/11/2015--patient seen in follow-up and had a recent colonoscopy which was negative. Serum iron studies were normal. Homocystine and methylmalonic acid were normal. No further workup. Recent CBC reveals a slightly low hemoglobin but a normal hematocrit.   02/04/2015--routine CBC reveals a low hemoglobin of 11.5,  hematocrit   • Chronic diarrhea 4/18/2016 07/25/2016--patient seen in follow-up and reports his diarrhea has improved but not resolved.  He reports that he WelChol did not help but he only took a maximum of 2 pills per day.  04/18/2016--patient with a history of partial colectomy performed in 2014 performed for an inflamed terminal ileum.  Part of the terminal ileum in the cecum removed.  Since that time patient presents with chronic diar   • Diabetes (CMS/[facility])    • Diabetic eye exam (CMS/[facility]) 2/3/2017    02/03/2017--patient reports he has not had a diabetic eye examination.  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  Order given.   • Diabetic foot exam 2/3/2017    02/03/2017--diabetic foot examination reveals no ulcers and no pre-ulcerative calluses.  He was left great toe and second toe have been ambulated from a lawnmower accident as a teenager.  Sensation is intact.   • Gout 7/15/2010    07/15/2010--patient presented with complaints of left foot pain. Initial diagnosis of gout. Uric acid 8.3. Treatment begun with allopurinol.   • Headache    • History of echocardiogram 12/14/2014 12/14/2014--echocardiogram reveals left ventricular chamber size is normal. Mild concentric left ventricular hypertrophy. Normal left ventricular systolic function with ejection fraction of 58%. Abnormal left ventricular diastolic function is observed. Left Atrium normal. Right ventricular cavity size is mildly enlarged. The right ventricular global systolic function is normal. Right atrium is nor   • History of Hepatitis, infectious Age 18    18 years of age--patient had infectious hepatitis. Exact type unknown.   • History of partial colectomy, 12/17/2014--ileo-cecal colectomy with primary reanastomosis. 12/17/2014 12/17/2014--patient presented with a small bowel obstruction secondary to inflamed terminal ileum. He underwent ileo-cecal colectomy with primary reanastomosis.   • History of small  bowel obstruction 12/15/2014    01/19/2015--patient seen in follow-up and is doing well. No change in current medical regimen at the present time. Set up fasting lab and follow up. I explained to patient that we will need to monitor him for the development of vitamin B12 deficiency. He will certainly be at risk for this.   12/17/2014--patient presented with a small bowel obstruction secondary to inflamed terminal ileum. He under   • Hyperlipidemia 6/13/2007 06/13/2007--treatment for hyperlipidemia begun.   • Hypothyroidism 5/8/2015 05/19/2017--TSH elevated at 5.33.  Free T4 and free T3 are normal.  It appears the patient truly has hypothyroidism.  Levothyroxine 50 µg per day initiated.  01/27/2016--repeat thyroid function tests normal.  11/11/2015--TSH elevated at 5.27. Free T4 low normal at 0.92. TPO antibodies less than 6. Repeat thyroid function tests ordered.  05/08/2015--TSH mildly elevated at 4.5. Free T4 low normal at   • Irritable bladder 5/11/2016 02/03/2017--patient reports that he is irritable bladder symptoms have returned but they seem to be intermittent.  He describes urgency and frequency.  However, he was diabetes is not under optimal control and could be playing a role.  His A1c is less than 8 at the present time.  In the past Myrbetriq seemed to be helpful and I will give patient samples to have at the time that he needs it.  He is   • Kidney stones    • Medical non-compliance 5/2/2019   • Microalbuminuria 8/3/2021    August 3, 2021--routine follow-up.  Patient is diabetic and his urine microalbumin/creatinine ratio returned elevated at 129.4.  May need low-dose ACE inhibitor.   • Nicotine dependence, cigarettes, uncomplicated 5/2/2019   • Nocturia 3/29/2016    02/03/2017--patient seen in follow-up and continues to have nocturia 2-3 times per night.  He has irritable bladder symptoms consisting of urgency and occasional incontinence has returned as well.  Myrbetriq samples given and  patient can use that as needed.  07/25/2016--patient reports irritable bladder symptoms have resolved and he stopped using the Myrbetriq.  05/11/2015--patient presents with a   • Non morbid obesity 8/3/2021   • Type 2 diabetes mellitus without complication, without long-term current use of insulin (CMS/[facility]) 3/9/2007    03/09/2007--patient presented with polyuria and polydipsia. Initial diagnosis of diabetes. Serum glucose 252, initial hemoglobin A1c 10.4.   • Vitamin D deficiency 4/12/2016       Past Surgical History:   Procedure Laterality Date   • AMPUTATION FOOT / TOE  18 years old    18 years of age--left great toe and left second toe amputation from a lawnmower accident.   • APPENDECTOMY  11 years old    11 years of age.   • CATARACT EXTRACTION Bilateral 2013 2013--patient reports he had bilateral cataract surgery about 4 years ago.  Intraocular lenses placed.   • COLECTOMY PARTIAL / TOTAL  12/17/2014 12/17/2014--patient presented with a small bowel obstruction secondary to inflamed terminal ileum. He underwent ileo-cecal colectomy with primary reanastomosis.   • COLONOSCOPY  04/06/2015 04/06/2015--colonoscopy revealed small internal hemorrhoids. Well healed ileocolic anastomosis. Fair prep.   • EXPLORATORY LAPAROTOMY  12/17/2014 12/17/2014--patient presented with a small bowel obstruction secondary to inflamed terminal ileum. He underwent ileo-cecal colectomy with primary reanastomosis.         Current Outpatient Medications:   •  allopurinol (ZYLOPRIM) 300 MG tablet, TAKE 1 TABLET BY MOUTH ONE TIME A DAY , Disp: 30 tablet, Rfl: 6  •  atorvastatin (LIPITOR) 80 MG tablet, TAKE 1 TABLET BY MOUTH EVERY DAY FOR CHOLESTEROL, Disp: 90 tablet, Rfl: 1  •  B-D ULTRAFINE III SHORT PEN 31G X 8 MM misc, Use with Victoza injections daily., Disp: , Rfl: 3  •  Bioflavonoid Products (SUPER C-500 PO), Take  by mouth., Disp: , Rfl:   •  Cholecalciferol (VITAMIN D3) 5000 UNITS capsule capsule, 1 by mouth daily as  directed, Disp: 30 capsule, Rfl: 11  •  levothyroxine (SYNTHROID, LEVOTHROID) 50 MCG tablet, TAKE 1 TABLET BY MOUTH EVERY DAY for low thyroid, Disp: 30 tablet, Rfl: 1  •  metoprolol tartrate (LOPRESSOR) 25 MG tablet, TAKE 1 TABLET BY MOUTH TWO TIMES A DAY for high blood pressure, Disp: 60 tablet, Rfl: 6  •  Probiotic Product (PROBIOTIC PO), Take  by mouth., Disp: , Rfl:   •  Semaglutide (Rybelsus) 7 MG tablet, Take 7 mg by mouth Daily. For diabetes, Disp: 90 tablet, Rfl: 3  •  SITagliptin-metFORMIN HCl ER (JANUMET XR)  MG tablet, Take 2 p.o. daily for diabetes, Disp: 60 tablet, Rfl: 5    Current Facility-Administered Medications:   •  sennosides-docusate (PERICOLACE) 8.6-50 MG per tablet 2 tablet, 2 tablet, Oral, Nightly, Guille Cedeno MD    No Known Allergies    Family History   Problem Relation Age of Onset   • Scoliosis Mother         Amyotrophic Lateral Sclerosis   • Stroke Father         Father had multiple strokes.   • Diabetes Father         Type 2   • Hypertension Father    • Diabetes Paternal Grandfather         Type 2   Father with history of colon cancer    Social History     Socioeconomic History   • Marital status:      Spouse name: Not on file   • Number of children: Not on file   • Years of education: 14   • Highest education level: Not on file   Tobacco Use   • Smoking status: Current Every Day Smoker     Packs/day: 1.00     Types: Cigarettes   • Smokeless tobacco: Never Used   Vaping Use   • Vaping Use: Never used   Substance and Sexual Activity   • Alcohol use: Yes     Comment: Minimum consumption   • Drug use: Yes     Types: Marijuana     Comment: Quit 20 years ago.    • Sexual activity: Yes     Partners: Female       REVIEW OF SYSTEMS    CONSTITUTIONAL: Denies fevers, chills, unintentional weight loss or weight gain  RESPIRATORY: Denies chronic cough or sob.   CARDIAC: Denies chest pain, palpitations, edema  GI: Denies dyspepsia, reflux, heartburn, nausea, vomiting,  "diarrhea or constipation  : Denies dysuria or hematuria.    MUSCULOSKELETAL: Denies muscle weakness and pain   NEURO: Denies chronic headaches.   ENDOCRINE: Denies significant heat or cold intolerance or history of thyroid problems.    DERM: no rashes,lesions or discharge.     Physical Examination  Ht 190.5 cm (75\")   Wt 112 kg (246 lb 3.2 oz)   BMI 30.77 kg/m²   Body mass index is 30.77 kg/m².  GENERAL:alert, well appearing, and in no distress and oriented to person, place, and time  HEENT: normochephalic, atraumatic, no scleral icterus moist mucous membranes.  NECK: Supple there is no thyromegaly or lymphadenopathy  CHEST: clear to auscultation, no wheezes, rales or rhonchi, symmetric air entry  CARDIAC: regular rate and rhythm    ABDOMEN: soft, nontender, nondistended, no masses or organomegaly  EXTREMITIES: no cyanosis, clubbing or edema    NEURO: alert and oriented, normal speech, cranial nerves 2-12 grossly intact, no focal deficits   SKIN: Moist, warm, no rashes.  Perianal examination revealed a thrombosed nontender right lateral external hemorrhoid.  Digital rectal examination show a tonic anal sphincter without any masses    Colonoscopy report from 2015 was reviewed.  Report describes an incomplete colonoscopy I was unable to be completed due to large amount of stool.    Repeat colonoscopy show normal colonic mucosa throughout, internal hemorrhoids.    Labs 7/27/2021: Normal hemoglobin, normal value cell count CMP with elevated blood glucose, otherwise normal labs    Assessment/Plan   66-year-old male with family history of colon cancer, constipation that is now resolved.  Smoking as risk factor.  Prior history of colon surgery.  He is due for colonoscopy.  Discussed with him about further step.  Recommend that he undergoes colonoscopy.  Risk and benefits of procedure including bleeding, infection, perforation were discussed in detail with the patient that verbalized understanding and agree with the " plan.  He will need to do liquid diet the day before.  I recommend the for the meantime he start taking stool softeners as well as fiber supplementation with 1 tablespoon of Metamucil daily.  He need to increase his water intake.  Ambulate.    Encourage smoking cessation  Surgical scheduling started    Discussed risks of surgery with patient and in particular increased risk of wound infection, poor wound healing, hernias (with abdominal surgery) and post-operative pulmonary complications associated with smoking.      Diagnosis Plan   1. Constipation, unspecified constipation type  sennosides-docusate (PERICOLACE) 8.6-50 MG per tablet 2 tablet    Case Request    Case Request            Guille Cedeno MD  General, Minimally Invasive and Endoscopic Surgery  [facility]    [address], [address]  [place], [place], [facility]  P: [phone]  F: [phone]

## 2021-08-11 NOTE — PROGRESS NOTES
Chief Complaint   Patient presents with   • Bowel Changes   • Colonoscopy       Subjective     Navarro Bruno is a 66 y.o. male here for evaluation of constipation.  The patient reports that 2 weeks ago he developed severe constipation was unable to have a bowel movement.  Lasted for several days.  He felt pain in his pelvis and was unable to urinate.  He eventually started having bowel movements and urinating.  Since then he has been having bowel movements every day.  He reports having hemorrhoids and intermittent episodes of rectal bleeding.  Reports having colonoscopy in 2015 where he had poor bowel preparation.  He has history of right hemicolectomy on 12/17/2014 for obstruction at the level of the terminal ileum and was thought to be related to chronic NSAIDs use.  Patient denies taking chronic NSAIDs anymore.  He continues to smoke.  He does not drink alcohol as he used to.  He used to be a heavy alcohol user.  Denies any weight loss.  Has history of chronic diarrhea treated with WelChol in the past.    Past Medical History:   Diagnosis Date   • Benign essential hypertension 4/19/2007 04/19/2007--treatment for hypertension begun.   • Benign prostatic hypertrophy 7/25/2016 07/25/2016--patient reports irritable bladder symptoms have resolved and he stopped using the Myrbetriq.  05/11/2015--patient presents with a one-month history of urinary urgency, occasional urge incontinence without dysuria. Patient has nocturia 2-3 times per night. Myrbetric 50 mg, one half by mouth daily initiated.   • Chronic anemia 2/4/2015 07/19/2016--hemoglobin slightly low at 13.3.  Hematocrit normal at 42.0.  05/11/2015--patient seen in follow-up and had a recent colonoscopy which was negative. Serum iron studies were normal. Homocystine and methylmalonic acid were normal. No further workup. Recent CBC reveals a slightly low hemoglobin but a normal hematocrit.   02/04/2015--routine CBC reveals a low hemoglobin of 11.5,  hematocrit   • Chronic diarrhea 4/18/2016 07/25/2016--patient seen in follow-up and reports his diarrhea has improved but not resolved.  He reports that he WelChol did not help but he only took a maximum of 2 pills per day.  04/18/2016--patient with a history of partial colectomy performed in 2014 performed for an inflamed terminal ileum.  Part of the terminal ileum in the cecum removed.  Since that time patient presents with chronic diar   • Diabetes (CMS/[facility])    • Diabetic eye exam (CMS/[facility]) 2/3/2017    02/03/2017--patient reports he has not had a diabetic eye examination.  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  Order given.   • Diabetic foot exam 2/3/2017    02/03/2017--diabetic foot examination reveals no ulcers and no pre-ulcerative calluses.  He was left great toe and second toe have been ambulated from a lawnmower accident as a teenager.  Sensation is intact.   • Gout 7/15/2010    07/15/2010--patient presented with complaints of left foot pain. Initial diagnosis of gout. Uric acid 8.3. Treatment begun with allopurinol.   • Headache    • History of echocardiogram 12/14/2014 12/14/2014--echocardiogram reveals left ventricular chamber size is normal. Mild concentric left ventricular hypertrophy. Normal left ventricular systolic function with ejection fraction of 58%. Abnormal left ventricular diastolic function is observed. Left Atrium normal. Right ventricular cavity size is mildly enlarged. The right ventricular global systolic function is normal. Right atrium is nor   • History of Hepatitis, infectious Age 18    18 years of age--patient had infectious hepatitis. Exact type unknown.   • History of partial colectomy, 12/17/2014--ileo-cecal colectomy with primary reanastomosis. 12/17/2014 12/17/2014--patient presented with a small bowel obstruction secondary to inflamed terminal ileum. He underwent ileo-cecal colectomy with primary reanastomosis.   • History of small  bowel obstruction 12/15/2014    01/19/2015--patient seen in follow-up and is doing well. No change in current medical regimen at the present time. Set up fasting lab and follow up. I explained to patient that we will need to monitor him for the development of vitamin B12 deficiency. He will certainly be at risk for this.   12/17/2014--patient presented with a small bowel obstruction secondary to inflamed terminal ileum. He under   • Hyperlipidemia 6/13/2007 06/13/2007--treatment for hyperlipidemia begun.   • Hypothyroidism 5/8/2015 05/19/2017--TSH elevated at 5.33.  Free T4 and free T3 are normal.  It appears the patient truly has hypothyroidism.  Levothyroxine 50 µg per day initiated.  01/27/2016--repeat thyroid function tests normal.  11/11/2015--TSH elevated at 5.27. Free T4 low normal at 0.92. TPO antibodies less than 6. Repeat thyroid function tests ordered.  05/08/2015--TSH mildly elevated at 4.5. Free T4 low normal at   • Irritable bladder 5/11/2016 02/03/2017--patient reports that he is irritable bladder symptoms have returned but they seem to be intermittent.  He describes urgency and frequency.  However, he was diabetes is not under optimal control and could be playing a role.  His A1c is less than 8 at the present time.  In the past Myrbetriq seemed to be helpful and I will give patient samples to have at the time that he needs it.  He is   • Kidney stones    • Medical non-compliance 5/2/2019   • Microalbuminuria 8/3/2021    August 3, 2021--routine follow-up.  Patient is diabetic and his urine microalbumin/creatinine ratio returned elevated at 129.4.  May need low-dose ACE inhibitor.   • Nicotine dependence, cigarettes, uncomplicated 5/2/2019   • Nocturia 3/29/2016    02/03/2017--patient seen in follow-up and continues to have nocturia 2-3 times per night.  He has irritable bladder symptoms consisting of urgency and occasional incontinence has returned as well.  Myrbetriq samples given and  patient can use that as needed.  07/25/2016--patient reports irritable bladder symptoms have resolved and he stopped using the Myrbetriq.  05/11/2015--patient presents with a   • Non morbid obesity 8/3/2021   • Type 2 diabetes mellitus without complication, without long-term current use of insulin (CMS/[facility]) 3/9/2007    03/09/2007--patient presented with polyuria and polydipsia. Initial diagnosis of diabetes. Serum glucose 252, initial hemoglobin A1c 10.4.   • Vitamin D deficiency 4/12/2016       Past Surgical History:   Procedure Laterality Date   • AMPUTATION FOOT / TOE  18 years old    18 years of age--left great toe and left second toe amputation from a lawnmower accident.   • APPENDECTOMY  11 years old    11 years of age.   • CATARACT EXTRACTION Bilateral 2013 2013--patient reports he had bilateral cataract surgery about 4 years ago.  Intraocular lenses placed.   • COLECTOMY PARTIAL / TOTAL  12/17/2014 12/17/2014--patient presented with a small bowel obstruction secondary to inflamed terminal ileum. He underwent ileo-cecal colectomy with primary reanastomosis.   • COLONOSCOPY  04/06/2015 04/06/2015--colonoscopy revealed small internal hemorrhoids. Well healed ileocolic anastomosis. Fair prep.   • EXPLORATORY LAPAROTOMY  12/17/2014 12/17/2014--patient presented with a small bowel obstruction secondary to inflamed terminal ileum. He underwent ileo-cecal colectomy with primary reanastomosis.         Current Outpatient Medications:   •  allopurinol (ZYLOPRIM) 300 MG tablet, TAKE 1 TABLET BY MOUTH ONE TIME A DAY , Disp: 30 tablet, Rfl: 6  •  atorvastatin (LIPITOR) 80 MG tablet, TAKE 1 TABLET BY MOUTH EVERY DAY FOR CHOLESTEROL, Disp: 90 tablet, Rfl: 1  •  B-D ULTRAFINE III SHORT PEN 31G X 8 MM misc, Use with Victoza injections daily., Disp: , Rfl: 3  •  Bioflavonoid Products (SUPER C-500 PO), Take  by mouth., Disp: , Rfl:   •  Cholecalciferol (VITAMIN D3) 5000 UNITS capsule capsule, 1 by mouth daily as  directed, Disp: 30 capsule, Rfl: 11  •  levothyroxine (SYNTHROID, LEVOTHROID) 50 MCG tablet, TAKE 1 TABLET BY MOUTH EVERY DAY for low thyroid, Disp: 30 tablet, Rfl: 1  •  metoprolol tartrate (LOPRESSOR) 25 MG tablet, TAKE 1 TABLET BY MOUTH TWO TIMES A DAY for high blood pressure, Disp: 60 tablet, Rfl: 6  •  Probiotic Product (PROBIOTIC PO), Take  by mouth., Disp: , Rfl:   •  Semaglutide (Rybelsus) 7 MG tablet, Take 7 mg by mouth Daily. For diabetes, Disp: 90 tablet, Rfl: 3  •  SITagliptin-metFORMIN HCl ER (JANUMET XR)  MG tablet, Take 2 p.o. daily for diabetes, Disp: 60 tablet, Rfl: 5    Current Facility-Administered Medications:   •  sennosides-docusate (PERICOLACE) 8.6-50 MG per tablet 2 tablet, 2 tablet, Oral, Nightly, Guille Cedeno MD    No Known Allergies    Family History   Problem Relation Age of Onset   • Scoliosis Mother         Amyotrophic Lateral Sclerosis   • Stroke Father         Father had multiple strokes.   • Diabetes Father         Type 2   • Hypertension Father    • Diabetes Paternal Grandfather         Type 2   Father with history of colon cancer    Social History     Socioeconomic History   • Marital status:      Spouse name: Not on file   • Number of children: Not on file   • Years of education: 14   • Highest education level: Not on file   Tobacco Use   • Smoking status: Current Every Day Smoker     Packs/day: 1.00     Types: Cigarettes   • Smokeless tobacco: Never Used   Vaping Use   • Vaping Use: Never used   Substance and Sexual Activity   • Alcohol use: Yes     Comment: Minimum consumption   • Drug use: Yes     Types: Marijuana     Comment: Quit 20 years ago.    • Sexual activity: Yes     Partners: Female       REVIEW OF SYSTEMS    CONSTITUTIONAL: Denies fevers, chills, unintentional weight loss or weight gain  RESPIRATORY: Denies chronic cough or sob.   CARDIAC: Denies chest pain, palpitations, edema  GI: Denies dyspepsia, reflux, heartburn, nausea, vomiting,  "diarrhea or constipation  : Denies dysuria or hematuria.    MUSCULOSKELETAL: Denies muscle weakness and pain   NEURO: Denies chronic headaches.   ENDOCRINE: Denies significant heat or cold intolerance or history of thyroid problems.    DERM: no rashes,lesions or discharge.     Physical Examination  Ht 190.5 cm (75\")   Wt 112 kg (246 lb 3.2 oz)   BMI 30.77 kg/m²   Body mass index is 30.77 kg/m².  GENERAL:alert, well appearing, and in no distress and oriented to person, place, and time  HEENT: normochephalic, atraumatic, no scleral icterus moist mucous membranes.  NECK: Supple there is no thyromegaly or lymphadenopathy  CHEST: clear to auscultation, no wheezes, rales or rhonchi, symmetric air entry  CARDIAC: regular rate and rhythm    ABDOMEN: soft, nontender, nondistended, no masses or organomegaly  EXTREMITIES: no cyanosis, clubbing or edema    NEURO: alert and oriented, normal speech, cranial nerves 2-12 grossly intact, no focal deficits   SKIN: Moist, warm, no rashes.  Perianal examination revealed a thrombosed nontender right lateral external hemorrhoid.  Digital rectal examination show a tonic anal sphincter without any masses    Colonoscopy report from 2015 was reviewed.  Report describes an incomplete colonoscopy I was unable to be completed due to large amount of stool.    Repeat colonoscopy show normal colonic mucosa throughout, internal hemorrhoids.    Labs 7/27/2021: Normal hemoglobin, normal value cell count CMP with elevated blood glucose, otherwise normal labs    Assessment/Plan   66-year-old male with family history of colon cancer, constipation that is now resolved.  Smoking as risk factor.  Prior history of colon surgery.  He is due for colonoscopy.  Discussed with him about further step.  Recommend that he undergoes colonoscopy.  Risk and benefits of procedure including bleeding, infection, perforation were discussed in detail with the patient that verbalized understanding and agree with the " plan.  He will need to do liquid diet the day before.  I recommend the for the meantime he start taking stool softeners as well as fiber supplementation with 1 tablespoon of Metamucil daily.  He need to increase his water intake.  Ambulate.    Encourage smoking cessation  Surgical scheduling started    Discussed risks of surgery with patient and in particular increased risk of wound infection, poor wound healing, hernias (with abdominal surgery) and post-operative pulmonary complications associated with smoking.      Diagnosis Plan   1. Constipation, unspecified constipation type  sennosides-docusate (PERICOLACE) 8.6-50 MG per tablet 2 tablet    Case Request    Case Request            Guille Cedeno MD  General, Minimally Invasive and Endoscopic Surgery  [facility], [facility]    [address], [address]  [employer], [place], [facility] [place]note 2[place]  P: [phone]  F: [phone]

## 2021-08-16 ENCOUNTER — TELEPHONE (OUTPATIENT)
Dept: INTERNAL MEDICINE | Facility: CLINIC | Age: 66
End: 2021-08-16

## 2021-08-16 NOTE — TELEPHONE ENCOUNTER
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     Do you require a callback: YES

## 2021-08-17 ENCOUNTER — ANESTHESIA EVENT (OUTPATIENT)
Dept: GASTROENTEROLOGY | Facility: HOSPITAL | Age: 66
End: 2021-08-17

## 2021-08-17 ENCOUNTER — HOSPITAL ENCOUNTER (OUTPATIENT)
Facility: HOSPITAL | Age: 66
Setting detail: HOSPITAL OUTPATIENT SURGERY
Discharge: HOME | End: 2021-08-17
Attending: SURGERY | Admitting: SURGERY

## 2021-08-17 ENCOUNTER — ANESTHESIA (OUTPATIENT)
Dept: GASTROENTEROLOGY | Facility: HOSPITAL | Age: 66
End: 2021-08-17

## 2021-08-17 VITALS
RESPIRATION RATE: 16 BRPM | HEIGHT: 75 IN | DIASTOLIC BLOOD PRESSURE: 65 MMHG | WEIGHT: 237 LBS | HEART RATE: 53 BPM | SYSTOLIC BLOOD PRESSURE: 102 MMHG | BODY MASS INDEX: 29.47 KG/M2 | OXYGEN SATURATION: 94 %

## 2021-08-17 DIAGNOSIS — K59.00 CONSTIPATION, UNSPECIFIED CONSTIPATION TYPE: ICD-10-CM

## 2021-08-17 DIAGNOSIS — K56.699 STRICTURE OF BOWEL (HCC): Primary | ICD-10-CM

## 2021-08-17 LAB — GLUCOSE BLDC GLUCOMTR-MCNC: 136 MG/DL (ref 70–130)

## 2021-08-17 PROCEDURE — 25010000002 PROPOFOL 10 MG/ML EMULSION: Performed by: ANESTHESIOLOGY

## 2021-08-17 PROCEDURE — 88305 TISSUE EXAM BY PATHOLOGIST: CPT | Performed by: SURGERY

## 2021-08-17 PROCEDURE — 82962 GLUCOSE BLOOD TEST: CPT

## 2021-08-17 RX ORDER — PROPOFOL 10 MG/ML
VIAL (ML) INTRAVENOUS AS NEEDED
Status: DISCONTINUED | OUTPATIENT
Start: 2021-08-17 | End: 2021-08-17 | Stop reason: SURG

## 2021-08-17 RX ORDER — LIDOCAINE HYDROCHLORIDE 20 MG/ML
INJECTION, SOLUTION INFILTRATION; PERINEURAL AS NEEDED
Status: DISCONTINUED | OUTPATIENT
Start: 2021-08-17 | End: 2021-08-17 | Stop reason: SURG

## 2021-08-17 RX ORDER — SODIUM CHLORIDE 0.9 % (FLUSH) 0.9 %
3 SYRINGE (ML) INJECTION EVERY 12 HOURS SCHEDULED
Status: DISCONTINUED | OUTPATIENT
Start: 2021-08-17 | End: 2021-08-17 | Stop reason: HOSPADM

## 2021-08-17 RX ORDER — SODIUM CHLORIDE 0.9 % (FLUSH) 0.9 %
10 SYRINGE (ML) INJECTION AS NEEDED
Status: DISCONTINUED | OUTPATIENT
Start: 2021-08-17 | End: 2021-08-17 | Stop reason: HOSPADM

## 2021-08-17 RX ORDER — SODIUM CHLORIDE, SODIUM LACTATE, POTASSIUM CHLORIDE, CALCIUM CHLORIDE 600; 310; 30; 20 MG/100ML; MG/100ML; MG/100ML; MG/100ML
30 INJECTION, SOLUTION INTRAVENOUS CONTINUOUS PRN
Status: DISCONTINUED | OUTPATIENT
Start: 2021-08-17 | End: 2021-08-17 | Stop reason: HOSPADM

## 2021-08-17 RX ADMIN — PROPOFOL 140 MCG/KG/MIN: 10 INJECTION, EMULSION INTRAVENOUS at 08:49

## 2021-08-17 RX ADMIN — LIDOCAINE HYDROCHLORIDE 60 MG: 20 INJECTION, SOLUTION INFILTRATION; PERINEURAL at 08:49

## 2021-08-17 RX ADMIN — PROPOFOL 100 MG: 10 INJECTION, EMULSION INTRAVENOUS at 08:49

## 2021-08-17 RX ADMIN — SODIUM CHLORIDE, POTASSIUM CHLORIDE, SODIUM LACTATE AND CALCIUM CHLORIDE: 600; 310; 30; 20 INJECTION, SOLUTION INTRAVENOUS at 08:44

## 2021-08-17 NOTE — DISCHARGE INSTRUCTIONS

## 2021-08-17 NOTE — ANESTHESIA POSTPROCEDURE EVALUATION
"Patient: Navarro Bruno    Procedure Summary     Date: 08/17/21 Room / [facility]:  [facility]    Anesthesia Start: 0844 Anesthesia Stop: 0920    Procedure: COLONOSCOPY TO ANASTAMOSIS (N/A ) Diagnosis:       Constipation, unspecified constipation type      (Constipation, unspecified constipation type [K59.00])    Surgeons: Guille Cedeno MD Provider: Grant Reyes MD    Anesthesia Type: MAC ASA Status: 3          Anesthesia Type: MAC    Vitals  Vitals Value Taken Time   /71 08/17/21 0917   Temp     Pulse 55 08/17/21 0917   Resp 16 08/17/21 0917   SpO2 94 % 08/17/21 0917           Post Anesthesia Care and Evaluation    Patient location during evaluation: bedside  Patient participation: complete - patient participated  Level of consciousness: awake and alert  Pain score: 0  Pain management: adequate  Airway patency: patent  Anesthetic complications: No anesthetic complications  PONV Status: none  Cardiovascular status: acceptable and hemodynamically stable  Respiratory status: acceptable and spontaneous ventilation  Hydration status: acceptable    Comments: /71 (BP Location: Left arm, Patient Position: Lying)   Pulse 55   Resp 16   Ht 190.5 cm (75\")   Wt 108 kg (237 lb)   SpO2 94%   BMI 29.62 kg/m²         "

## 2021-08-17 NOTE — ANESTHESIA PREPROCEDURE EVALUATION
" Anesthesia Evaluation     Patient summary reviewed and Nursing notes reviewed   NPO Solid Status: > 8 hours  NPO Liquid Status: > 2 hours           Airway   Mallampati: II  TM distance: >3 FB  Neck ROM: full  No difficulty expected  Dental    (+) poor dentition    Pulmonary - normal exam   (+) a smoker Current,   Cardiovascular - normal exam  Exercise tolerance: good (4-7 METS)    ECG reviewed  Patient on routine beta blocker and Beta blocker given within 24 hours of surgery    (+) hypertension, hyperlipidemia,       Neuro/Psych  (+) headaches,     GI/Hepatic/Renal/Endo    (+) obesity,   hepatitis, liver disease, renal disease CRI, diabetes mellitus type 2,     Musculoskeletal (-) negative ROS    Abdominal    Substance History - negative use     OB/GYN          Other - negative ROS           Phys Exam Other: Nothing \"loose\" per patient                Anesthesia Plan    ASA 3     MAC     intravenous induction     Anesthetic plan, all risks, benefits, and alternatives have been provided, discussed and informed consent has been obtained with: patient.      "

## 2021-08-18 LAB
CYTO UR: NORMAL
LAB AP CASE REPORT: NORMAL
PATH REPORT.FINAL DX SPEC: NORMAL
PATH REPORT.GROSS SPEC: NORMAL

## 2021-08-18 NOTE — TELEPHONE ENCOUNTER
PATIENT IS CALLING IN HE STATES THAT EMPLOYER NEVER DID RECEIVE THE SHORT TERM PAPERWORK AND HE WANTED TO LEAVE FAX NUMBER TO HAVE THAT SENT ASAP.    FAX NUMBER   [employer]    [phone]

## 2021-08-20 ENCOUNTER — TELEPHONE (OUTPATIENT)
Dept: SURGERY | Facility: CLINIC | Age: 66
End: 2021-08-20

## 2021-08-20 DIAGNOSIS — K51.019 CHRONIC ULCERATIVE ILEOCOLITIS WITH COMPLICATION (HCC): Primary | ICD-10-CM

## 2021-08-24 ENCOUNTER — TELEPHONE (OUTPATIENT)
Dept: SURGERY | Facility: CLINIC | Age: 66
End: 2021-08-24

## 2021-08-24 DIAGNOSIS — K52.9 INFLAMMATORY BOWEL DISEASE: Primary | ICD-10-CM

## 2021-08-24 NOTE — TELEPHONE ENCOUNTER
I spoke with the patient regarding his biopsy results with ulceration at the ileocolonic anastomosis.  He has history of terminal ileal stricture.  He was diagnosed with NSAIDs related stricture but the biopsy reports some features concerning for Crohn's disease.  He has not been on any treatment.  I discussed with him about further step.  Recommend that he undergo small bowel follow-through to assess for anastomotic stricture as well as laboratory results and inflammatory disease panel.  After we have the results we will discuss further step.  If this is related to inflammatory bowel disease he may be candidate for different type of medications.  If this is related to chronic ischemia then he may need to have surgical revision of the anastomosis.  He understood

## 2021-08-30 ENCOUNTER — HOSPITAL ENCOUNTER (OUTPATIENT)
Dept: GENERAL RADIOLOGY | Facility: HOSPITAL | Age: 66
Discharge: HOME | End: 2021-08-30

## 2021-08-30 ENCOUNTER — LAB (OUTPATIENT)
Dept: LAB | Facility: HOSPITAL | Age: 66
End: 2021-08-30

## 2021-08-30 DIAGNOSIS — K56.699 STRICTURE OF BOWEL (HCC): ICD-10-CM

## 2021-08-30 DIAGNOSIS — K51.019 CHRONIC ULCERATIVE ILEOCOLITIS WITH COMPLICATION (HCC): ICD-10-CM

## 2021-08-30 DIAGNOSIS — K52.9 INFLAMMATORY BOWEL DISEASE: ICD-10-CM

## 2021-08-30 LAB
ALBUMIN SERPL-MCNC: 4 G/DL (ref 3.5–5.2)
ALBUMIN/GLOB SERPL: 1.7 G/DL
ALP SERPL-CCNC: 88 U/L (ref 39–117)
ALT SERPL W P-5'-P-CCNC: <5 U/L (ref 1–41)
ANION GAP SERPL CALCULATED.3IONS-SCNC: 9.1 MMOL/L (ref 5–15)
AST SERPL-CCNC: 9 U/L (ref 1–40)
BASOPHILS # BLD AUTO: 0.06 10*3/MM3 (ref 0–0.2)
BASOPHILS NFR BLD AUTO: 0.6 % (ref 0–1.5)
BILIRUB SERPL-MCNC: 0.3 MG/DL (ref 0–1.2)
BUN SERPL-MCNC: 13 MG/DL (ref 8–23)
BUN/CREAT SERPL: 15.7 (ref 7–25)
CALCIUM SPEC-SCNC: 9.2 MG/DL (ref 8.6–10.5)
CHLORIDE SERPL-SCNC: 104 MMOL/L (ref 98–107)
CO2 SERPL-SCNC: 25.9 MMOL/L (ref 22–29)
CREAT SERPL-MCNC: 0.83 MG/DL (ref 0.76–1.27)
DEPRECATED RDW RBC AUTO: 46.1 FL (ref 37–54)
EOSINOPHIL # BLD AUTO: 0.2 10*3/MM3 (ref 0–0.4)
EOSINOPHIL NFR BLD AUTO: 2 % (ref 0.3–6.2)
ERYTHROCYTE [DISTWIDTH] IN BLOOD BY AUTOMATED COUNT: 13.8 % (ref 12.3–15.4)
ERYTHROCYTE [SEDIMENTATION RATE] IN BLOOD: 24 MM/HR (ref 0–20)
GFR SERPL CREATININE-BSD FRML MDRD: 92 ML/MIN/1.73
GLOBULIN UR ELPH-MCNC: 2.4 GM/DL
GLUCOSE SERPL-MCNC: 133 MG/DL (ref 65–99)
HCT VFR BLD AUTO: 41.5 % (ref 37.5–51)
HGB BLD-MCNC: 12.9 G/DL (ref 13–17.7)
IMM GRANULOCYTES # BLD AUTO: 0.15 10*3/MM3 (ref 0–0.05)
IMM GRANULOCYTES NFR BLD AUTO: 1.5 % (ref 0–0.5)
LYMPHOCYTES # BLD AUTO: 2.33 10*3/MM3 (ref 0.7–3.1)
LYMPHOCYTES NFR BLD AUTO: 22.7 % (ref 19.6–45.3)
MCH RBC QN AUTO: 28.1 PG (ref 26.6–33)
MCHC RBC AUTO-ENTMCNC: 31.1 G/DL (ref 31.5–35.7)
MCV RBC AUTO: 90.4 FL (ref 79–97)
MONOCYTES # BLD AUTO: 0.68 10*3/MM3 (ref 0.1–0.9)
MONOCYTES NFR BLD AUTO: 6.6 % (ref 5–12)
NEUTROPHILS NFR BLD AUTO: 6.83 10*3/MM3 (ref 1.7–7)
NEUTROPHILS NFR BLD AUTO: 66.6 % (ref 42.7–76)
NRBC BLD AUTO-RTO: 0 /100 WBC (ref 0–0.2)
PLATELET # BLD AUTO: 238 10*3/MM3 (ref 140–450)
PMV BLD AUTO: 9.4 FL (ref 6–12)
POTASSIUM SERPL-SCNC: 4.7 MMOL/L (ref 3.5–5.2)
PROT SERPL-MCNC: 6.4 G/DL (ref 6–8.5)
RBC # BLD AUTO: 4.59 10*6/MM3 (ref 4.14–5.8)
SODIUM SERPL-SCNC: 139 MMOL/L (ref 136–145)
WBC # BLD AUTO: 10.25 10*3/MM3 (ref 3.4–10.8)

## 2021-08-30 PROCEDURE — 85025 COMPLETE CBC W/AUTO DIFF WBC: CPT

## 2021-08-30 PROCEDURE — 85652 RBC SED RATE AUTOMATED: CPT

## 2021-08-30 PROCEDURE — 80053 COMPREHEN METABOLIC PANEL: CPT

## 2021-08-30 PROCEDURE — 36415 COLL VENOUS BLD VENIPUNCTURE: CPT

## 2021-08-30 PROCEDURE — 74250 X-RAY XM SM INT 1CNTRST STD: CPT

## 2021-08-30 RX ADMIN — BARIUM SULFATE 366 ML: 960 POWDER, FOR SUSPENSION ORAL at 09:15

## 2021-09-01 ENCOUNTER — TELEPHONE (OUTPATIENT)
Dept: SURGERY | Facility: CLINIC | Age: 66
End: 2021-09-01

## 2021-09-02 ENCOUNTER — TELEPHONE (OUTPATIENT)
Dept: SURGERY | Facility: CLINIC | Age: 66
End: 2021-09-02

## 2021-09-02 NOTE — TELEPHONE ENCOUNTER
I spoke to patient regarding his small bowel follow-through results.  He has approximately 20 cm of ileum there is stricture leading to the anastomosis with the colon.  He has been fairly asymptomatic from that.  Discussed with him about the need to wait for the results of the blood test regarding inflammatory bowel disease.  If he has inflammatory bowel disease then he will need to have treatment.  If not then option will be for him to have resection of the anastomosis and the strictured small bowel with reanastomosis versus observation until symptoms arise.  He understood

## 2021-09-03 DIAGNOSIS — E11.9 TYPE 2 DIABETES MELLITUS WITHOUT COMPLICATION, WITHOUT LONG-TERM CURRENT USE OF INSULIN (HCC): Chronic | ICD-10-CM

## 2021-09-03 RX ORDER — ORAL SEMAGLUTIDE 7 MG/1
7 TABLET ORAL DAILY
Qty: 90 TABLET | Refills: 3 | Status: SHIPPED | OUTPATIENT
Start: 2021-09-03 | End: 2022-09-16

## 2021-09-03 NOTE — TELEPHONE ENCOUNTER
Caller: Navarro Bruno    Relationship: Self    Best call back number: [phone]    Medication needed:   Requested Prescriptions     Pending Prescriptions Disp Refills   • Semaglutide (Rybelsus) 7 MG tablet 90 tablet 3     Sig: Take 7 mg by mouth Daily. For diabetes       When do you need the refill by: 09/03    What additional details did the patient provide when requesting the medication: IS COMPLETELY OUT OF MEDICATION     Does the patient have less than a 3 day supply:  [x] Yes  [] No    What is the patient's preferred pharmacy: [facility] [id number] [place] [phone] [facility] [phone] FX

## 2021-09-06 LAB — REF LAB TEST METHOD: NORMAL

## 2021-09-07 ENCOUNTER — TELEPHONE (OUTPATIENT)
Dept: SURGERY | Facility: CLINIC | Age: 66
End: 2021-09-07

## 2021-09-07 NOTE — TELEPHONE ENCOUNTER
I spoke with the patient regarding his results.  He does not have findings consistent with inflammatory bowel disease.  Discussed with him the the small bowel at the terminal ileum is extremely narrow and concerned that this will eventually cause problems.  He is asymptomatic now from it.  Discussed with him about the need to follow-up in my office within the next month to discuss further.  I will suggest the he avoids any leafy vegetables for now.  He understood

## 2021-09-27 ENCOUNTER — TELEPHONE (OUTPATIENT)
Dept: INTERNAL MEDICINE | Facility: CLINIC | Age: 66
End: 2021-09-27

## 2021-09-27 NOTE — TELEPHONE ENCOUNTER
Caller: Navarro Bruno    Relationship: Self    Medication requested (name and dosage):   RYBELSUS 7MG    Pharmacy where request should be sent:   FROM      Additional details provided by patient:  PATIENT STATES THAT THIS MEDICATION IS FROM  AND IS REQUESTING TO HAVE THIS SENT     Best call back number: [phone]    Does the patient have less than a 3 day supply:  [x] Yes  [] No    [address] Rep   09/27/21 12:47 EDT

## 2021-09-27 NOTE — TELEPHONE ENCOUNTER
Patient notified that his medication has arrived, can pick it up at desk. Expressed understanding.

## 2021-10-08 DIAGNOSIS — E03.9 HYPOTHYROIDISM, UNSPECIFIED TYPE: Chronic | ICD-10-CM

## 2021-10-08 RX ORDER — LEVOTHYROXINE SODIUM 0.05 MG/1
TABLET ORAL
Qty: 30 TABLET | Refills: 2 | Status: SHIPPED | OUTPATIENT
Start: 2021-10-08 | End: 2022-02-07

## 2021-11-16 ENCOUNTER — TELEPHONE (OUTPATIENT)
Dept: INTERNAL MEDICINE | Facility: CLINIC | Age: 66
End: 2021-11-16

## 2021-11-19 ENCOUNTER — OFFICE VISIT (OUTPATIENT)
Dept: SURGERY | Facility: CLINIC | Age: 66
End: 2021-11-19

## 2021-11-19 ENCOUNTER — PREP FOR SURGERY (OUTPATIENT)
Dept: OTHER | Facility: HOSPITAL | Age: 66
End: 2021-11-19

## 2021-11-19 VITALS — HEIGHT: 75 IN | BODY MASS INDEX: 31.16 KG/M2 | WEIGHT: 250.6 LBS

## 2021-11-19 DIAGNOSIS — K56.699 SMALL BOWEL STRICTURE (HCC): Primary | ICD-10-CM

## 2021-11-19 DIAGNOSIS — K63.3 ILEUM ULCER: Primary | ICD-10-CM

## 2021-11-19 PROCEDURE — 99214 OFFICE O/P EST MOD 30 MIN: CPT | Performed by: SURGERY

## 2021-11-19 RX ORDER — ERYTHROMYCIN 500 MG/1
1000 TABLET, COATED ORAL 3 TIMES DAILY
Qty: 6 TABLET | Refills: 0 | Status: SHIPPED | OUTPATIENT
Start: 2021-11-19 | End: 2021-11-20

## 2021-11-19 RX ORDER — GABAPENTIN 300 MG/1
600 CAPSULE ORAL 3 TIMES DAILY
Status: CANCELLED | OUTPATIENT
Start: 2021-12-22

## 2021-11-19 RX ORDER — ACETAMINOPHEN 500 MG
1000 TABLET ORAL EVERY 6 HOURS
Status: CANCELLED | OUTPATIENT
Start: 2021-12-22

## 2021-11-19 RX ORDER — CHLORHEXIDINE GLUCONATE 4 G/100ML
SOLUTION TOPICAL 2 TIMES DAILY
Qty: 236 ML | Refills: 0 | Status: SHIPPED | OUTPATIENT
Start: 2021-11-19 | End: 2022-02-28

## 2021-11-19 RX ORDER — NEOMYCIN SULFATE 500 MG/1
1000 TABLET ORAL 3 TIMES DAILY
Qty: 6 TABLET | Refills: 0 | Status: SHIPPED | OUTPATIENT
Start: 2021-11-19 | End: 2021-11-20

## 2021-11-19 RX ORDER — ALVIMOPAN 12 MG/1
12 CAPSULE ORAL ONCE
Status: CANCELLED | OUTPATIENT
Start: 2021-12-22 | End: 2021-11-19

## 2021-11-20 NOTE — PROGRESS NOTES
CC: Ileal stricture     HPI: 66-year-old male that is here today for follow-up.  The patient is here today for follow-up after undergoing small bowel follow-through for stricture ileum.  He has history of open right hemicolectomy for ileal obstruction in 12/17/2014 for what he was thought to be related to chronic NSAIDs use.  Patient has not been taking NSAIDs since then.  He was seen in my office for changes of bowel habits.  He underwent colonoscopy that show evidence of stricture at the ileocolonic anastomosis.  Pathology report show evidence of ulceration and inflamed granulation tissue with necroinflammatory debris's at the anastomosis.  Inflammatory bowel disease panel was negative.  The patient has been doing very well and has been having normal bowel function.  He is here today for discussion of surgical mention of his ileal stricture     PMH: Hypertension, BPH, chronic anemia, chronic diarrhea, diabetes, gout, headache, hyperlipidemia, hypothyroidism, irritable bladder syndrome, nephrolithiasis, microalbuminuria, nocturia, obesity, vitamin D deficiency     PSH:   -Foot amputation left great toe 19 years ago  -Appendectomy   -Cataract extraction  -Open right hemicolectomy 12/17/2014  -Colonoscopy 4/6/2015, 8/17/2021    MEDS: Sennoside, allopurinol, atorvastatin, metoprolol, Victoza, calcium citrate vitamin D, probiotics, levothyroxine, semaglutide,     ALL: No known drug allergies    FH and SH: Family history is noncontributory.  No family history of Crohn's or ulcerative colitis    Social History     Socioeconomic History   • Marital status:    • Years of education: 14   Tobacco Use   • Smoking status: Current Every Day Smoker     Packs/day: 1.00     Types: Cigarettes   • Smokeless tobacco: Never Used   Vaping Use   • Vaping Use: Never used   Substance and Sexual Activity   • Alcohol use: Yes     Comment: Minimum consumption   • Drug use: Not Currently     Types: Marijuana     Comment: Quit 20 years  "ago.    • Sexual activity: Yes     Partners: Female        ROS:   Constitutional: denies any weight changes, fatigue or weakness.  HEENT: Denies hearing loss and rhinorrhea  Cardiovascular: denies chest pain, palpitations, edemas.  Respiratory: denies cough, sputum, SOB.  Gastrointestinal: denies N&V, abd pain, reports diarrhea, denies constipation.  Genitourinary: denies dysuria, frequency.  Endocrine: denies cold intolerance, lethargy and flushing.  Hem: denies excessive bruising and postop bleeding.  Musculoskeletal: denies weakness, joint swelling, pain or stiffness.  Neuro: denies seizures, CVA, paresthesia, or peripheral neuropathy.   Skin: denies change in nevi, rashes, masses.     PE:   Vitals:    11/19/21 0825   Weight: 114 kg (250 lb 9.6 oz)   Height: 190.5 cm (75\")     Body mass index is 31.32 kg/m².   Alert and oriented ×3, no acute distress.  Head is normocephalic and atraumatic.  Neck is supple there is no thyromegaly or lymphadenopathy  Chest is clear bilaterally there is no added sounds  Regular rate and rhythm, no murmurs  Abdomen is soft and nontender, is nondistended, bowel sounds are positive.  There is no rebound or guarding is and there is no peritoneal signs.  Well-healed midline incision  No clubbing cyanosis or edema in lower or upper extremities    Diagnostic studies:   Small bowel follow-through 8/30/2021 there is a fixed luminal compromise involving the distal 20 cm of terminal ileum proximal to the ileocolonic anastomosis.  There is no evidence of obstruction    Labs 8/30/2021:   -IBD diagnostic panel negative for Crohn's or ulcerative colitis  -Hemoglobin 12.9, otherwise normal CBC  -Sedimentation rate slightly elevated  Glucose 133, otherwise normal CMP    Final Diagnosis   1. Colon, Anastomosis, Biopsy: Colonic and small bowel type mucosa with                A. Ulceration, inflamed granulation tissue, necroinflammatory debris, and reparative/regenerative epithelial        changes.  B. " No dysplasia nor malignancy.  C. No granulomata, diagnostic viral inclusions nor intestinal parasites identified.        Assessment and plan    The patient is a very pleasant 66-year-old male with chronic ileal stricture without any clear reason other than NSAIDs use in the past.  Not sure why he continues to have the ileal stricture right now.  His small bowel follow-through as well as pathology report from colonoscopy was reviewed by me and discussed with the patient.  I think that he has a very long stricture at the ileum that will eventually cause obstruction.  Therefore I recommend he undergoes laparoscopic ileocolonic anastomosis and small bowel resection with anastomosis possible open.  Risk and benefits of procedure including bleeding, infection, intra-abdominal injuries, leak were discussed in detail with the patient that verbalized understanding agree with the plan.  Discussed with him about the need to keep losing weight and quit smoking.     Discussed with patient increased perioperative risks associated with obesity including increased risks of DVT, infection, seromas, poor wound healing and hernias (with abdominal surgery).    Discussed risks of surgery with patient and in particular increased risk of wound infection, poor wound healing, hernias (with abdominal surgery) and post-operative pulmonary complications associated with smoking.     Surgical scheduling started  Will need to have bowel prep     Guille Cedeno MD  General, Minimally Invasive and Endoscopic Surgery  [facility]     [address], [address]  [place], [place], [address]  P: [phone]  F: [phone]

## 2021-12-07 DIAGNOSIS — Z87.39 HISTORY OF GOUT: Chronic | ICD-10-CM

## 2021-12-09 RX ORDER — ALLOPURINOL 300 MG/1
TABLET ORAL
Qty: 90 TABLET | Refills: 2 | Status: SHIPPED | OUTPATIENT
Start: 2021-12-09 | End: 2022-04-18

## 2021-12-20 ENCOUNTER — PRE-ADMISSION TESTING (OUTPATIENT)
Dept: PREADMISSION TESTING | Facility: HOSPITAL | Age: 66
End: 2021-12-20

## 2021-12-20 ENCOUNTER — TELEPHONE (OUTPATIENT)
Dept: SURGERY | Facility: CLINIC | Age: 66
End: 2021-12-20

## 2021-12-20 VITALS
BODY MASS INDEX: 30.53 KG/M2 | SYSTOLIC BLOOD PRESSURE: 128 MMHG | TEMPERATURE: 98 F | DIASTOLIC BLOOD PRESSURE: 82 MMHG | WEIGHT: 245.5 LBS | OXYGEN SATURATION: 96 % | HEIGHT: 75 IN | HEART RATE: 58 BPM | RESPIRATION RATE: 18 BRPM

## 2021-12-20 DIAGNOSIS — K63.3 ILEUM ULCER: ICD-10-CM

## 2021-12-20 LAB
ANION GAP SERPL CALCULATED.3IONS-SCNC: 9.4 MMOL/L (ref 5–15)
BUN SERPL-MCNC: 13 MG/DL (ref 8–23)
BUN/CREAT SERPL: 18.3 (ref 7–25)
CALCIUM SPEC-SCNC: 9.2 MG/DL (ref 8.6–10.5)
CHLORIDE SERPL-SCNC: 106 MMOL/L (ref 98–107)
CO2 SERPL-SCNC: 24.6 MMOL/L (ref 22–29)
CREAT SERPL-MCNC: 0.71 MG/DL (ref 0.76–1.27)
DEPRECATED RDW RBC AUTO: 42.8 FL (ref 37–54)
ERYTHROCYTE [DISTWIDTH] IN BLOOD BY AUTOMATED COUNT: 13.2 % (ref 12.3–15.4)
GFR SERPL CREATININE-BSD FRML MDRD: 111 ML/MIN/1.73
GLUCOSE SERPL-MCNC: 156 MG/DL (ref 65–99)
HCT VFR BLD AUTO: 40.6 % (ref 37.5–51)
HGB BLD-MCNC: 13.1 G/DL (ref 13–17.7)
MCH RBC QN AUTO: 28.7 PG (ref 26.6–33)
MCHC RBC AUTO-ENTMCNC: 32.3 G/DL (ref 31.5–35.7)
MCV RBC AUTO: 88.8 FL (ref 79–97)
PLATELET # BLD AUTO: 278 10*3/MM3 (ref 140–450)
PMV BLD AUTO: 9.8 FL (ref 6–12)
POTASSIUM SERPL-SCNC: 4.1 MMOL/L (ref 3.5–5.2)
QT INTERVAL: 460 MS
RBC # BLD AUTO: 4.57 10*6/MM3 (ref 4.14–5.8)
SARS-COV-2 ORF1AB RESP QL NAA+PROBE: DETECTED
SODIUM SERPL-SCNC: 140 MMOL/L (ref 136–145)
WBC NRBC COR # BLD: 11.92 10*3/MM3 (ref 3.4–10.8)

## 2021-12-20 PROCEDURE — U0005 INFEC AGEN DETEC AMPLI PROBE: HCPCS

## 2021-12-20 PROCEDURE — 80048 BASIC METABOLIC PNL TOTAL CA: CPT

## 2021-12-20 PROCEDURE — 85027 COMPLETE CBC AUTOMATED: CPT

## 2021-12-20 PROCEDURE — 93005 ELECTROCARDIOGRAM TRACING: CPT

## 2021-12-20 PROCEDURE — C9803 HOPD COVID-19 SPEC COLLECT: HCPCS

## 2021-12-20 PROCEDURE — 93010 ELECTROCARDIOGRAM REPORT: CPT | Performed by: INTERNAL MEDICINE

## 2021-12-20 PROCEDURE — 36415 COLL VENOUS BLD VENIPUNCTURE: CPT

## 2021-12-20 PROCEDURE — U0004 COV-19 TEST NON-CDC HGH THRU: HCPCS

## 2021-12-20 RX ORDER — CHLORHEXIDINE GLUCONATE 500 MG/1
1 CLOTH TOPICAL TAKE AS DIRECTED
COMMUNITY
End: 2022-02-28

## 2021-12-20 RX ORDER — SITAGLIPTIN AND METFORMIN HYDROCHLORIDE 1000; 50 MG/1; MG/1
1 TABLET, FILM COATED ORAL 2 TIMES DAILY WITH MEALS
COMMUNITY
End: 2022-07-25 | Stop reason: SDUPTHER

## 2021-12-20 NOTE — TELEPHONE ENCOUNTER
Spoke with patient regarding his Covid test.  This is positive.  He has not had any symptoms.  Discussed with him about the need to quarantine for at least 10 days.  We will cancel the surgery for now.  Patient to call my office of Dr. Chavira if any symptoms.  He understood

## 2021-12-20 NOTE — TELEPHONE ENCOUNTER
----- Message from Guille Cedeno MD sent at 12/20/2021  2:20 PM EST -----  Please cancel surgery for this patient on Wednesday, he tested positive for Covid.  Please reschedule for more than 10 days from NOW

## 2021-12-20 NOTE — TELEPHONE ENCOUNTER
Called patient to reschedule his surgery due to a positive covid test. There was no voicemail set up.

## 2021-12-20 NOTE — DISCHARGE INSTRUCTIONS
Take the following medications the morning of surgery:  METOPROLOL, LEVOTHYROXINE      Arrive to hospital on your day of surgery at 10:00 AM.      If you are on prescription narcotic pain medication to control your pain you may also take that medication the morning of surgery.    General Instructions:  • Do not eat solid food after midnight the night before surgery.  • You may drink clear liquids day of surgery but must stop at least one hour before your hospital arrival time.  • It is beneficial for you to have a clear drink that contains carbohydrates the day of surgery.  We suggest a 12 to 20 ounce bottle of Gatorade or Powerade for non-diabetic patients or a 12 to 20 ounce bottle of G2 or Powerade Zero for diabetic patients. (Pediatric patients, are not advised to drink a 12 to 20 ounce carbohydrate drink)    Clear liquids are liquids you can see through.  Nothing red in color.     Plain water                               Sports drinks  Sodas                                   Gelatin (Jell-O)  Fruit juices without pulp such as white grape juice and apple juice  Popsicles that contain no fruit or yogurt  Tea or coffee (no cream or milk added)  Gatorade / Powerade  G2 / Powerade Zero    • Infants may have breast milk up to four hours before surgery.  • Infants drinking formula may drink formula up to six hours before surgery.   • Patients who avoid smoking, chewing tobacco and alcohol for 4 weeks prior to surgery have a reduced risk of post-operative complications.  Quit smoking as many days before surgery as you can.  • Do not smoke, use chewing tobacco or drink alcohol the day of surgery.   • If applicable bring your C-PAP/ BI-PAP machine.  • Bring any papers given to you in the doctor’s office.  • Wear clean comfortable clothes.  • Do not wear contact lenses, false eyelashes or make-up.  Bring a case for your glasses.   • Bring crutches or walker if applicable.  • Remove all piercings.  Leave jewelry and any  other valuables at home.  • Hair extensions with metal clips must be removed prior to surgery.  • The Pre-Admission Testing nurse will instruct you to bring medications if unable to obtain an accurate list in Pre-Admission Testing.        If you were given a blood bank ID arm band remember to bring it with you the day of surgery.    Preventing a Surgical Site Infection:  • For 2 to 3 days before surgery, avoid shaving with a razor because the razor can irritate skin and make it easier to develop an infection.    • Any areas of open skin can increase the risk of a post-operative wound infection by allowing bacteria to enter and travel throughout the body.  Notify your surgeon if you have any skin wounds / rashes even if it is not near the expected surgical site.  The area will need assessed to determine if surgery should be delayed until it is healed.  • The night prior to surgery shower using a fresh bar of anti-bacterial soap (such as Dial) and clean washcloth.  Sleep in a clean bed with clean clothing.  Do not allow pets to sleep with you.  • Shower on the morning of surgery using a fresh bar of anti-bacterial soap (such as Dial) and clean washcloth.  Dry with a clean towel and dress in clean clothing.  • Ask your surgeon if you will be receiving antibiotics prior to surgery.  • Make sure you, your family, and all healthcare providers clean their hands with soap and water or an alcohol based hand  before caring for you or your wound.    Day of surgery:  Your arrival time is approximately two hours before your scheduled surgery time.  Upon arrival, a Pre-op nurse and Anesthesiologist will review your health history, obtain vital signs, and answer questions you may have.  The only belongings needed at this time will be a list of your home medications and if applicable your C-PAP/BI-PAP machine.  A Pre-op nurse will start an IV and you may receive medication in preparation for surgery, including something to  help you relax.     Please be aware that surgery does come with discomfort.  We want to make every effort to control your discomfort so please discuss any uncontrolled symptoms with your nurse.   Your doctor will most likely have prescribed pain medications.      If you are going home after surgery you will receive individualized written care instructions before being discharged.  A responsible adult must drive you to and from the hospital on the day of your surgery and stay with you for 24 hours.  Discharge prescriptions can be filled by the hospital pharmacy during regular pharmacy hours.  If you are having surgery late in the day/evening your prescription may be e-prescribed to your pharmacy.  Please verify your pharmacy hours or chose a 24 hour pharmacy to avoid not having access to your prescription because your pharmacy has closed for the day.    If you are staying overnight following surgery, you will be transported to your hospital room following the recovery period.  [facility] has all private rooms.    If you have any questions please call Pre-Admission Testing at [phone].  Deductibles and co-payments are collected on the day of service. Please be prepared to pay the required co-pay, deductible or deposit on the day of service as defined by your plan.    Patient Education for Self-Quarantine Process    • Following your COVID testing, we strongly recommend that you wear a mask when you are with other people and practice social distancing.   • Limit your activities to only required outings.  • Wash your hands with soap and water frequently for at least 20 seconds.   • Avoid touching your eyes, nose and mouth with unwashed hands.  • Do not share anything - utensils, drinking glasses, food from the same bowl.   • Sanitize household surfaces daily. Include all high touch areas (door handles, light switches, phones, countertops, etc.)    Call your surgeon immediately if you experience any of  the following symptoms:  • Sore Throat  • Shortness of Breath or difficulty breathing  • Cough  • Chills  • Body soreness or muscle pain  • Headache  • Fever  • New loss of taste or smell  • Do not arrive for your surgery ill.  Your procedure will need to be rescheduled to another time.  You will need to call your physician before the day of surgery to avoid any unnecessary exposure to hospital staff as well as other patients.    CHLORHEXIDINE CLOTH INSTRUCTIONS  The morning of surgery follow these instructions using the Chlorhexidine cloths you've been given.  These steps reduce bacteria on the body.  Do not use the cloths near your eyes, ears mouth, genitalia or on open wounds.  Throw the cloths away after use but do not try to flush them down a toilet.      • Open and remove one cloth at a time from the package.    • Leave the cloth unfolded and begin the bathing.  • Massage the skin with the cloths using gentle pressure to remove bacteria.  Do not scrub harshly.   • Follow the steps below with one 2% CHG cloth per area (6 total cloths).  • One cloth for neck, shoulders and chest.  • One cloth for both arms, hands, fingers and underarms (do underarms last).  • One cloth for the abdomen followed by groin.  • One cloth for right leg and foot including between the toes.  • One cloth for left leg and foot including between the toes.  • The last cloth is to be used for the back of the neck, back and buttocks.    Allow the CHG to air dry 3 minutes on the skin which will give it time to work and decrease the chance of irritation.  The skin may feel sticky until it is dry.  Do not rinse with water or any other liquid or you will lose the beneficial effects of the CHG.  If mild skin irritation occurs, do rinse the skin to remove the CHG.  Report this to the nurse at time of admission.  Do not apply lotions, creams, ointments, deodorants or perfumes after using the clothes. Dress in clean clothes before coming to the  [facility].

## 2022-01-12 ENCOUNTER — TELEPHONE (OUTPATIENT)
Dept: SURGERY | Facility: CLINIC | Age: 67
End: 2022-01-12

## 2022-01-12 NOTE — TELEPHONE ENCOUNTER
Patient's surgery scheduled on 1/26/22 with Dr. Cedeno has been postponed due to Covid. We will call to reschedule when we are able to.

## 2022-01-24 ENCOUNTER — APPOINTMENT (OUTPATIENT)
Dept: PREADMISSION TESTING | Facility: HOSPITAL | Age: 67
End: 2022-01-24

## 2022-02-07 DIAGNOSIS — E03.9 HYPOTHYROIDISM, UNSPECIFIED TYPE: Chronic | ICD-10-CM

## 2022-02-07 RX ORDER — LEVOTHYROXINE SODIUM 0.05 MG/1
TABLET ORAL
Qty: 30 TABLET | Refills: 0 | Status: ON HOLD | OUTPATIENT
Start: 2022-02-07 | End: 2022-03-04

## 2022-02-15 ENCOUNTER — PREP FOR SURGERY (OUTPATIENT)
Dept: OTHER | Facility: HOSPITAL | Age: 67
End: 2022-02-15

## 2022-02-15 ENCOUNTER — TELEPHONE (OUTPATIENT)
Dept: SURGERY | Facility: CLINIC | Age: 67
End: 2022-02-15

## 2022-02-15 DIAGNOSIS — K56.699 SMALL BOWEL STRICTURE: Primary | ICD-10-CM

## 2022-02-15 RX ORDER — PREGABALIN 75 MG/1
75 CAPSULE ORAL ONCE
Status: CANCELLED | OUTPATIENT
Start: 2022-03-02 | End: 2022-02-15

## 2022-02-15 RX ORDER — NEOMYCIN SULFATE 500 MG/1
1000 TABLET ORAL 3 TIMES DAILY
Qty: 6 TABLET | Refills: 0 | Status: SHIPPED | OUTPATIENT
Start: 2022-02-15 | End: 2022-02-16

## 2022-02-15 RX ORDER — ALVIMOPAN 12 MG/1
12 CAPSULE ORAL ONCE
Status: CANCELLED | OUTPATIENT
Start: 2022-03-02 | End: 2022-02-15

## 2022-02-15 RX ORDER — ERYTHROMYCIN 500 MG/1
1000 TABLET, COATED ORAL 3 TIMES DAILY
Qty: 6 TABLET | Refills: 0 | Status: SHIPPED | OUTPATIENT
Start: 2022-02-15 | End: 2022-02-16

## 2022-02-15 RX ORDER — CHLORHEXIDINE GLUCONATE 4 G/100ML
SOLUTION TOPICAL 2 TIMES DAILY
Qty: 236 ML | Refills: 0 | Status: SHIPPED | OUTPATIENT
Start: 2022-02-15 | End: 2022-02-28

## 2022-02-15 RX ORDER — ACETAMINOPHEN 500 MG
1000 TABLET ORAL EVERY 6 HOURS
Status: CANCELLED | OUTPATIENT
Start: 2022-03-02

## 2022-02-15 RX ORDER — CELECOXIB 200 MG/1
200 CAPSULE ORAL EVERY 12 HOURS SCHEDULED
Status: CANCELLED | OUTPATIENT
Start: 2022-03-02

## 2022-02-15 NOTE — TELEPHONE ENCOUNTER
I called the patient regarding the list of restrictions for elective surgeries that require admission.  I offer him robotic assisted laparoscopic ileocolic resection.  I think it would be a better option of performing straight laparoscopically.  Risk and benefits of procedure including bleeding, infection, anastomotic leak and the risk of converting to open were discussed in detail with the patient that verbalized understanding and agree with the plan

## 2022-02-21 DIAGNOSIS — E11.9 TYPE 2 DIABETES MELLITUS WITHOUT COMPLICATION: ICD-10-CM

## 2022-02-22 RX ORDER — SITAGLIPTIN AND METFORMIN HYDROCHLORIDE 1000; 50 MG/1; MG/1
TABLET, FILM COATED, EXTENDED RELEASE ORAL
Qty: 60 TABLET | Refills: 0 | Status: SHIPPED | OUTPATIENT
Start: 2022-02-22 | End: 2022-02-28

## 2022-02-28 ENCOUNTER — PRE-ADMISSION TESTING (OUTPATIENT)
Dept: PREADMISSION TESTING | Facility: HOSPITAL | Age: 67
End: 2022-02-28

## 2022-02-28 VITALS
TEMPERATURE: 97.8 F | RESPIRATION RATE: 20 BRPM | OXYGEN SATURATION: 96 % | BODY MASS INDEX: 29.59 KG/M2 | HEIGHT: 75 IN | HEART RATE: 66 BPM | DIASTOLIC BLOOD PRESSURE: 81 MMHG | SYSTOLIC BLOOD PRESSURE: 146 MMHG | WEIGHT: 238 LBS

## 2022-02-28 DIAGNOSIS — K56.699 SMALL BOWEL STRICTURE: ICD-10-CM

## 2022-02-28 DIAGNOSIS — E03.9 HYPOTHYROIDISM, UNSPECIFIED TYPE: Chronic | ICD-10-CM

## 2022-02-28 LAB
ANION GAP SERPL CALCULATED.3IONS-SCNC: 12 MMOL/L (ref 5–15)
BUN SERPL-MCNC: 16 MG/DL (ref 8–23)
BUN/CREAT SERPL: 18.4 (ref 7–25)
CALCIUM SPEC-SCNC: 9.4 MG/DL (ref 8.6–10.5)
CHLORIDE SERPL-SCNC: 101 MMOL/L (ref 98–107)
CO2 SERPL-SCNC: 25 MMOL/L (ref 22–29)
CREAT SERPL-MCNC: 0.87 MG/DL (ref 0.76–1.27)
DEPRECATED RDW RBC AUTO: 47.2 FL (ref 37–54)
EGFRCR SERPLBLD CKD-EPI 2021: 94.6 ML/MIN/1.73
ERYTHROCYTE [DISTWIDTH] IN BLOOD BY AUTOMATED COUNT: 14.6 % (ref 12.3–15.4)
GLUCOSE SERPL-MCNC: 118 MG/DL (ref 65–99)
HCT VFR BLD AUTO: 40.6 % (ref 37.5–51)
HGB BLD-MCNC: 12.9 G/DL (ref 13–17.7)
MCH RBC QN AUTO: 28.5 PG (ref 26.6–33)
MCHC RBC AUTO-ENTMCNC: 31.8 G/DL (ref 31.5–35.7)
MCV RBC AUTO: 89.8 FL (ref 79–97)
PLATELET # BLD AUTO: 276 10*3/MM3 (ref 140–450)
PMV BLD AUTO: 10.3 FL (ref 6–12)
POTASSIUM SERPL-SCNC: 4.1 MMOL/L (ref 3.5–5.2)
RBC # BLD AUTO: 4.52 10*6/MM3 (ref 4.14–5.8)
SARS-COV-2 ORF1AB RESP QL NAA+PROBE: NOT DETECTED
SODIUM SERPL-SCNC: 138 MMOL/L (ref 136–145)
WBC NRBC COR # BLD: 10.43 10*3/MM3 (ref 3.4–10.8)

## 2022-02-28 PROCEDURE — C9803 HOPD COVID-19 SPEC COLLECT: HCPCS

## 2022-02-28 PROCEDURE — U0005 INFEC AGEN DETEC AMPLI PROBE: HCPCS

## 2022-02-28 PROCEDURE — 80048 BASIC METABOLIC PNL TOTAL CA: CPT

## 2022-02-28 PROCEDURE — 85027 COMPLETE CBC AUTOMATED: CPT

## 2022-02-28 PROCEDURE — U0004 COV-19 TEST NON-CDC HGH THRU: HCPCS

## 2022-02-28 PROCEDURE — 36415 COLL VENOUS BLD VENIPUNCTURE: CPT

## 2022-02-28 RX ORDER — CHLORHEXIDINE GLUCONATE 500 MG/1
CLOTH TOPICAL TAKE AS DIRECTED
COMMUNITY
End: 2022-03-06 | Stop reason: HOSPADM

## 2022-02-28 RX ORDER — LEVOTHYROXINE SODIUM 0.05 MG/1
TABLET ORAL
Qty: 30 TABLET | Refills: 0 | OUTPATIENT
Start: 2022-02-28

## 2022-03-02 ENCOUNTER — ANESTHESIA (OUTPATIENT)
Dept: PERIOP | Facility: HOSPITAL | Age: 67
End: 2022-03-02

## 2022-03-02 ENCOUNTER — HOSPITAL ENCOUNTER (INPATIENT)
Facility: HOSPITAL | Age: 67
LOS: 4 days | Discharge: HOME | End: 2022-03-06
Attending: SURGERY | Admitting: SURGERY

## 2022-03-02 ENCOUNTER — ANESTHESIA EVENT (OUTPATIENT)
Dept: PERIOP | Facility: HOSPITAL | Age: 67
End: 2022-03-02

## 2022-03-02 DIAGNOSIS — K56.699 STRICTURE OF BOWEL: Primary | ICD-10-CM

## 2022-03-02 DIAGNOSIS — K63.3 ILEUM ULCER: ICD-10-CM

## 2022-03-02 DIAGNOSIS — K56.699 SMALL BOWEL STRICTURE: ICD-10-CM

## 2022-03-02 LAB
GLUCOSE BLDC GLUCOMTR-MCNC: 133 MG/DL (ref 70–130)
GLUCOSE BLDC GLUCOMTR-MCNC: 156 MG/DL (ref 70–130)
GLUCOSE BLDC GLUCOMTR-MCNC: 160 MG/DL (ref 70–130)
GLUCOSE BLDC GLUCOMTR-MCNC: 171 MG/DL (ref 70–130)

## 2022-03-02 PROCEDURE — 44160 REMOVAL OF COLON: CPT | Performed by: SURGERY

## 2022-03-02 PROCEDURE — 25010000002 NEOSTIGMINE 5 MG/10ML SOLUTION: Performed by: NURSE ANESTHETIST, CERTIFIED REGISTERED

## 2022-03-02 PROCEDURE — 25010000002 MAGNESIUM SULFATE PER 500 MG OF MAGNESIUM: Performed by: NURSE ANESTHETIST, CERTIFIED REGISTERED

## 2022-03-02 PROCEDURE — 63710000001 INSULIN LISPRO (HUMAN) PER 5 UNITS: Performed by: SURGERY

## 2022-03-02 PROCEDURE — 25010000002 FENTANYL CITRATE (PF) 50 MCG/ML SOLUTION: Performed by: NURSE ANESTHETIST, CERTIFIED REGISTERED

## 2022-03-02 PROCEDURE — 0DBK0ZZ EXCISION OF ASCENDING COLON, OPEN APPROACH: ICD-10-PCS | Performed by: SURGERY

## 2022-03-02 PROCEDURE — 88307 TISSUE EXAM BY PATHOLOGIST: CPT | Performed by: SURGERY

## 2022-03-02 PROCEDURE — 25010000002 CEFOXITIN PER 1 G: Performed by: SURGERY

## 2022-03-02 PROCEDURE — 25010000002 HYDROMORPHONE PER 4 MG: Performed by: NURSE ANESTHETIST, CERTIFIED REGISTERED

## 2022-03-02 PROCEDURE — 44160 REMOVAL OF COLON: CPT | Performed by: SPECIALIST/TECHNOLOGIST, OTHER

## 2022-03-02 PROCEDURE — 25010000002 PROPOFOL 10 MG/ML EMULSION: Performed by: NURSE ANESTHETIST, CERTIFIED REGISTERED

## 2022-03-02 PROCEDURE — 0DBB0ZZ EXCISION OF ILEUM, OPEN APPROACH: ICD-10-PCS | Performed by: SURGERY

## 2022-03-02 PROCEDURE — 25010000002 SUCCINYLCHOLINE PER 20 MG: Performed by: NURSE ANESTHETIST, CERTIFIED REGISTERED

## 2022-03-02 PROCEDURE — 94799 UNLISTED PULMONARY SVC/PX: CPT

## 2022-03-02 PROCEDURE — 8E0W4CZ ROBOTIC ASSISTED PROCEDURE OF TRUNK REGION, PERCUTANEOUS ENDOSCOPIC APPROACH: ICD-10-PCS | Performed by: SURGERY

## 2022-03-02 PROCEDURE — 88304 TISSUE EXAM BY PATHOLOGIST: CPT | Performed by: SURGERY

## 2022-03-02 PROCEDURE — 0D1B0ZK BYPASS ILEUM TO ASCENDING COLON, OPEN APPROACH: ICD-10-PCS | Performed by: SURGERY

## 2022-03-02 PROCEDURE — 0DNU0ZZ RELEASE OMENTUM, OPEN APPROACH: ICD-10-PCS | Performed by: SURGERY

## 2022-03-02 PROCEDURE — C1889 IMPLANT/INSERT DEVICE, NOC: HCPCS | Performed by: SURGERY

## 2022-03-02 PROCEDURE — 82962 GLUCOSE BLOOD TEST: CPT

## 2022-03-02 PROCEDURE — C9290 INJ, BUPIVACAINE LIPOSOME: HCPCS | Performed by: ANESTHESIOLOGY

## 2022-03-02 PROCEDURE — S0260 H&P FOR SURGERY: HCPCS | Performed by: SURGERY

## 2022-03-02 PROCEDURE — 0DNU4ZZ RELEASE OMENTUM, PERCUTANEOUS ENDOSCOPIC APPROACH: ICD-10-PCS | Performed by: SURGERY

## 2022-03-02 PROCEDURE — C1765 ADHESION BARRIER: HCPCS | Performed by: SURGERY

## 2022-03-02 PROCEDURE — 0DN80ZZ RELEASE SMALL INTESTINE, OPEN APPROACH: ICD-10-PCS | Performed by: SURGERY

## 2022-03-02 PROCEDURE — 0DNL0ZZ RELEASE TRANSVERSE COLON, OPEN APPROACH: ICD-10-PCS | Performed by: SURGERY

## 2022-03-02 PROCEDURE — 0 BUPIVACAINE LIPOSOME 1.3 % SUSPENSION: Performed by: ANESTHESIOLOGY

## 2022-03-02 PROCEDURE — 25010000002 KETOROLAC TROMETHAMINE PER 15 MG: Performed by: SURGERY

## 2022-03-02 PROCEDURE — 25010000002 DEXAMETHASONE PER 1 MG: Performed by: NURSE ANESTHETIST, CERTIFIED REGISTERED

## 2022-03-02 DEVICE — ENDOPATH ECHELON ENDOSCOPIC LINEAR CUTTER RELOADS, BLUE, 60MM
Type: IMPLANTABLE DEVICE | Site: ABDOMEN | Status: FUNCTIONAL
Brand: ECHELON ENDOPATH

## 2022-03-02 RX ORDER — LABETALOL HYDROCHLORIDE 5 MG/ML
5 INJECTION, SOLUTION INTRAVENOUS
Status: DISCONTINUED | OUTPATIENT
Start: 2022-03-02 | End: 2022-03-02 | Stop reason: HOSPADM

## 2022-03-02 RX ORDER — HYDROCODONE BITARTRATE AND ACETAMINOPHEN 7.5; 325 MG/1; MG/1
1 TABLET ORAL ONCE AS NEEDED
Status: DISCONTINUED | OUTPATIENT
Start: 2022-03-02 | End: 2022-03-02 | Stop reason: HOSPADM

## 2022-03-02 RX ORDER — ALLOPURINOL 300 MG/1
300 TABLET ORAL DAILY
Status: DISCONTINUED | OUTPATIENT
Start: 2022-03-02 | End: 2022-03-06 | Stop reason: HOSPADM

## 2022-03-02 RX ORDER — GLYCOPYRROLATE 0.2 MG/ML
INJECTION INTRAMUSCULAR; INTRAVENOUS AS NEEDED
Status: DISCONTINUED | OUTPATIENT
Start: 2022-03-02 | End: 2022-03-02 | Stop reason: SURG

## 2022-03-02 RX ORDER — PREGABALIN 75 MG/1
75 CAPSULE ORAL ONCE
Status: COMPLETED | OUTPATIENT
Start: 2022-03-02 | End: 2022-03-02

## 2022-03-02 RX ORDER — PROPOFOL 10 MG/ML
VIAL (ML) INTRAVENOUS AS NEEDED
Status: DISCONTINUED | OUTPATIENT
Start: 2022-03-02 | End: 2022-03-02 | Stop reason: SURG

## 2022-03-02 RX ORDER — SODIUM CHLORIDE 0.9 % (FLUSH) 0.9 %
10 SYRINGE (ML) INJECTION AS NEEDED
Status: DISCONTINUED | OUTPATIENT
Start: 2022-03-02 | End: 2022-03-02 | Stop reason: HOSPADM

## 2022-03-02 RX ORDER — NALOXONE HCL 0.4 MG/ML
0.4 VIAL (ML) INJECTION
Status: DISCONTINUED | OUTPATIENT
Start: 2022-03-02 | End: 2022-03-06

## 2022-03-02 RX ORDER — LIDOCAINE HYDROCHLORIDE 20 MG/ML
INJECTION, SOLUTION INFILTRATION; PERINEURAL AS NEEDED
Status: DISCONTINUED | OUTPATIENT
Start: 2022-03-02 | End: 2022-03-02 | Stop reason: SURG

## 2022-03-02 RX ORDER — SUCCINYLCHOLINE CHLORIDE 20 MG/ML
INJECTION INTRAMUSCULAR; INTRAVENOUS AS NEEDED
Status: DISCONTINUED | OUTPATIENT
Start: 2022-03-02 | End: 2022-03-02 | Stop reason: SURG

## 2022-03-02 RX ORDER — ALBUTEROL SULFATE 2.5 MG/3ML
2.5 SOLUTION RESPIRATORY (INHALATION) ONCE AS NEEDED
Status: DISCONTINUED | OUTPATIENT
Start: 2022-03-02 | End: 2022-03-02 | Stop reason: HOSPADM

## 2022-03-02 RX ORDER — OXYCODONE AND ACETAMINOPHEN 7.5; 325 MG/1; MG/1
1 TABLET ORAL EVERY 4 HOURS PRN
Status: DISCONTINUED | OUTPATIENT
Start: 2022-03-02 | End: 2022-03-02 | Stop reason: HOSPADM

## 2022-03-02 RX ORDER — HEPARIN SODIUM 5000 [USP'U]/ML
5000 INJECTION, SOLUTION INTRAVENOUS; SUBCUTANEOUS EVERY 8 HOURS SCHEDULED
Status: DISCONTINUED | OUTPATIENT
Start: 2022-03-03 | End: 2022-03-06 | Stop reason: HOSPADM

## 2022-03-02 RX ORDER — HYDROMORPHONE HYDROCHLORIDE 1 MG/ML
0.5 INJECTION, SOLUTION INTRAMUSCULAR; INTRAVENOUS; SUBCUTANEOUS
Status: DISCONTINUED | OUTPATIENT
Start: 2022-03-02 | End: 2022-03-06

## 2022-03-02 RX ORDER — INSULIN LISPRO 100 [IU]/ML
0-9 INJECTION, SOLUTION INTRAVENOUS; SUBCUTANEOUS
Status: DISCONTINUED | OUTPATIENT
Start: 2022-03-02 | End: 2022-03-06 | Stop reason: HOSPADM

## 2022-03-02 RX ORDER — DEXAMETHASONE SODIUM PHOSPHATE 10 MG/ML
INJECTION INTRAMUSCULAR; INTRAVENOUS AS NEEDED
Status: DISCONTINUED | OUTPATIENT
Start: 2022-03-02 | End: 2022-03-02 | Stop reason: SURG

## 2022-03-02 RX ORDER — PROMETHAZINE HYDROCHLORIDE 25 MG/1
25 TABLET ORAL ONCE AS NEEDED
Status: DISCONTINUED | OUTPATIENT
Start: 2022-03-02 | End: 2022-03-02 | Stop reason: HOSPADM

## 2022-03-02 RX ORDER — ATORVASTATIN CALCIUM 80 MG/1
80 TABLET, FILM COATED ORAL DAILY
Status: DISCONTINUED | OUTPATIENT
Start: 2022-03-02 | End: 2022-03-06 | Stop reason: HOSPADM

## 2022-03-02 RX ORDER — ONDANSETRON 2 MG/ML
4 INJECTION INTRAMUSCULAR; INTRAVENOUS EVERY 6 HOURS PRN
Status: DISCONTINUED | OUTPATIENT
Start: 2022-03-02 | End: 2022-03-06 | Stop reason: HOSPADM

## 2022-03-02 RX ORDER — CELECOXIB 200 MG/1
200 CAPSULE ORAL EVERY 12 HOURS SCHEDULED
Status: DISCONTINUED | OUTPATIENT
Start: 2022-03-02 | End: 2022-03-02 | Stop reason: HOSPADM

## 2022-03-02 RX ORDER — MAGNESIUM SULFATE HEPTAHYDRATE 500 MG/ML
INJECTION, SOLUTION INTRAMUSCULAR; INTRAVENOUS AS NEEDED
Status: DISCONTINUED | OUTPATIENT
Start: 2022-03-02 | End: 2022-03-02 | Stop reason: SURG

## 2022-03-02 RX ORDER — IBUPROFEN 600 MG/1
600 TABLET ORAL ONCE AS NEEDED
Status: DISCONTINUED | OUTPATIENT
Start: 2022-03-02 | End: 2022-03-02 | Stop reason: HOSPADM

## 2022-03-02 RX ORDER — KETOROLAC TROMETHAMINE 15 MG/ML
15 INJECTION, SOLUTION INTRAMUSCULAR; INTRAVENOUS EVERY 6 HOURS
Status: DISPENSED | OUTPATIENT
Start: 2022-03-02 | End: 2022-03-04

## 2022-03-02 RX ORDER — SODIUM CHLORIDE 9 MG/ML
100 INJECTION, SOLUTION INTRAVENOUS CONTINUOUS
Status: DISCONTINUED | OUTPATIENT
Start: 2022-03-02 | End: 2022-03-04

## 2022-03-02 RX ORDER — ROCURONIUM BROMIDE 10 MG/ML
INJECTION, SOLUTION INTRAVENOUS AS NEEDED
Status: DISCONTINUED | OUTPATIENT
Start: 2022-03-02 | End: 2022-03-02 | Stop reason: SURG

## 2022-03-02 RX ORDER — ONDANSETRON 4 MG/1
4 TABLET, FILM COATED ORAL EVERY 6 HOURS PRN
Status: DISCONTINUED | OUTPATIENT
Start: 2022-03-02 | End: 2022-03-06 | Stop reason: HOSPADM

## 2022-03-02 RX ORDER — LEVOTHYROXINE SODIUM 0.05 MG/1
50 TABLET ORAL DAILY
Status: DISCONTINUED | OUTPATIENT
Start: 2022-03-02 | End: 2022-03-06 | Stop reason: HOSPADM

## 2022-03-02 RX ORDER — FENTANYL CITRATE 50 UG/ML
50 INJECTION, SOLUTION INTRAMUSCULAR; INTRAVENOUS
Status: DISCONTINUED | OUTPATIENT
Start: 2022-03-02 | End: 2022-03-02 | Stop reason: HOSPADM

## 2022-03-02 RX ORDER — HYDROMORPHONE HCL 110MG/55ML
PATIENT CONTROLLED ANALGESIA SYRINGE INTRAVENOUS AS NEEDED
Status: DISCONTINUED | OUTPATIENT
Start: 2022-03-02 | End: 2022-03-02 | Stop reason: SURG

## 2022-03-02 RX ORDER — ACETAMINOPHEN 500 MG
1000 TABLET ORAL EVERY 6 HOURS
Status: DISCONTINUED | OUTPATIENT
Start: 2022-03-02 | End: 2022-03-02 | Stop reason: HOSPADM

## 2022-03-02 RX ORDER — FAMOTIDINE 20 MG/1
20 TABLET, FILM COATED ORAL 2 TIMES DAILY
Status: DISCONTINUED | OUTPATIENT
Start: 2022-03-02 | End: 2022-03-06 | Stop reason: HOSPADM

## 2022-03-02 RX ORDER — EPHEDRINE SULFATE 50 MG/ML
INJECTION, SOLUTION INTRAVENOUS AS NEEDED
Status: DISCONTINUED | OUTPATIENT
Start: 2022-03-02 | End: 2022-03-02 | Stop reason: SURG

## 2022-03-02 RX ORDER — SODIUM CHLORIDE, SODIUM LACTATE, POTASSIUM CHLORIDE, CALCIUM CHLORIDE 600; 310; 30; 20 MG/100ML; MG/100ML; MG/100ML; MG/100ML
INJECTION, SOLUTION INTRAVENOUS CONTINUOUS PRN
Status: DISCONTINUED | OUTPATIENT
Start: 2022-03-02 | End: 2022-03-02 | Stop reason: SURG

## 2022-03-02 RX ORDER — BUPIVACAINE HYDROCHLORIDE AND EPINEPHRINE 5; 5 MG/ML; UG/ML
INJECTION, SOLUTION EPIDURAL; INTRACAUDAL; PERINEURAL AS NEEDED
Status: DISCONTINUED | OUTPATIENT
Start: 2022-03-02 | End: 2022-03-02 | Stop reason: HOSPADM

## 2022-03-02 RX ORDER — NEOSTIGMINE METHYLSULFATE 0.5 MG/ML
INJECTION, SOLUTION INTRAVENOUS AS NEEDED
Status: DISCONTINUED | OUTPATIENT
Start: 2022-03-02 | End: 2022-03-02 | Stop reason: SURG

## 2022-03-02 RX ORDER — FAMOTIDINE 10 MG/ML
20 INJECTION, SOLUTION INTRAVENOUS
Status: COMPLETED | OUTPATIENT
Start: 2022-03-02 | End: 2022-03-02

## 2022-03-02 RX ORDER — ALVIMOPAN 12 MG/1
12 CAPSULE ORAL ONCE
Status: COMPLETED | OUTPATIENT
Start: 2022-03-02 | End: 2022-03-02

## 2022-03-02 RX ORDER — DIPHENHYDRAMINE HYDROCHLORIDE 50 MG/ML
12.5 INJECTION INTRAMUSCULAR; INTRAVENOUS
Status: DISCONTINUED | OUTPATIENT
Start: 2022-03-02 | End: 2022-03-02 | Stop reason: HOSPADM

## 2022-03-02 RX ORDER — OXYCODONE HYDROCHLORIDE AND ACETAMINOPHEN 5; 325 MG/1; MG/1
1 TABLET ORAL EVERY 4 HOURS PRN
Status: DISCONTINUED | OUTPATIENT
Start: 2022-03-02 | End: 2022-03-06 | Stop reason: HOSPADM

## 2022-03-02 RX ORDER — NAPROXEN 250 MG/1
250 TABLET ORAL 2 TIMES DAILY PRN
Status: DISCONTINUED | OUTPATIENT
Start: 2022-03-02 | End: 2022-03-06 | Stop reason: HOSPADM

## 2022-03-02 RX ORDER — EPHEDRINE SULFATE 50 MG/ML
5 INJECTION, SOLUTION INTRAVENOUS ONCE AS NEEDED
Status: DISCONTINUED | OUTPATIENT
Start: 2022-03-02 | End: 2022-03-02 | Stop reason: HOSPADM

## 2022-03-02 RX ORDER — NALOXONE HCL 0.4 MG/ML
0.2 VIAL (ML) INJECTION AS NEEDED
Status: DISCONTINUED | OUTPATIENT
Start: 2022-03-02 | End: 2022-03-02 | Stop reason: HOSPADM

## 2022-03-02 RX ORDER — DIPHENHYDRAMINE HCL 25 MG
25 CAPSULE ORAL
Status: DISCONTINUED | OUTPATIENT
Start: 2022-03-02 | End: 2022-03-02 | Stop reason: HOSPADM

## 2022-03-02 RX ORDER — ONDANSETRON 2 MG/ML
4 INJECTION INTRAMUSCULAR; INTRAVENOUS ONCE AS NEEDED
Status: DISCONTINUED | OUTPATIENT
Start: 2022-03-02 | End: 2022-03-02 | Stop reason: HOSPADM

## 2022-03-02 RX ORDER — HYDRALAZINE HYDROCHLORIDE 20 MG/ML
5 INJECTION INTRAMUSCULAR; INTRAVENOUS
Status: DISCONTINUED | OUTPATIENT
Start: 2022-03-02 | End: 2022-03-02 | Stop reason: HOSPADM

## 2022-03-02 RX ORDER — SODIUM CHLORIDE 0.9 % (FLUSH) 0.9 %
10 SYRINGE (ML) INJECTION EVERY 12 HOURS SCHEDULED
Status: DISCONTINUED | OUTPATIENT
Start: 2022-03-02 | End: 2022-03-02 | Stop reason: HOSPADM

## 2022-03-02 RX ORDER — BUPIVACAINE HYDROCHLORIDE 2.5 MG/ML
INJECTION, SOLUTION EPIDURAL; INFILTRATION; INTRACAUDAL
Status: COMPLETED | OUTPATIENT
Start: 2022-03-02 | End: 2022-03-02

## 2022-03-02 RX ORDER — SODIUM CHLORIDE, SODIUM LACTATE, POTASSIUM CHLORIDE, CALCIUM CHLORIDE 600; 310; 30; 20 MG/100ML; MG/100ML; MG/100ML; MG/100ML
9 INJECTION, SOLUTION INTRAVENOUS CONTINUOUS PRN
Status: DISCONTINUED | OUTPATIENT
Start: 2022-03-02 | End: 2022-03-02 | Stop reason: HOSPADM

## 2022-03-02 RX ORDER — FENTANYL CITRATE 50 UG/ML
INJECTION, SOLUTION INTRAMUSCULAR; INTRAVENOUS AS NEEDED
Status: DISCONTINUED | OUTPATIENT
Start: 2022-03-02 | End: 2022-03-02 | Stop reason: SURG

## 2022-03-02 RX ORDER — MIDAZOLAM HYDROCHLORIDE 1 MG/ML
1 INJECTION INTRAMUSCULAR; INTRAVENOUS
Status: DISCONTINUED | OUTPATIENT
Start: 2022-03-02 | End: 2022-03-02 | Stop reason: HOSPADM

## 2022-03-02 RX ORDER — HYDROMORPHONE HYDROCHLORIDE 1 MG/ML
0.25 INJECTION, SOLUTION INTRAMUSCULAR; INTRAVENOUS; SUBCUTANEOUS
Status: DISCONTINUED | OUTPATIENT
Start: 2022-03-02 | End: 2022-03-02 | Stop reason: HOSPADM

## 2022-03-02 RX ORDER — FLUMAZENIL 0.1 MG/ML
0.2 INJECTION INTRAVENOUS AS NEEDED
Status: DISCONTINUED | OUTPATIENT
Start: 2022-03-02 | End: 2022-03-02 | Stop reason: HOSPADM

## 2022-03-02 RX ORDER — MAGNESIUM HYDROXIDE 1200 MG/15ML
LIQUID ORAL AS NEEDED
Status: DISCONTINUED | OUTPATIENT
Start: 2022-03-02 | End: 2022-03-02 | Stop reason: HOSPADM

## 2022-03-02 RX ORDER — PROMETHAZINE HYDROCHLORIDE 25 MG/1
25 SUPPOSITORY RECTAL ONCE AS NEEDED
Status: DISCONTINUED | OUTPATIENT
Start: 2022-03-02 | End: 2022-03-02 | Stop reason: HOSPADM

## 2022-03-02 RX ORDER — ACETAMINOPHEN 500 MG
1000 TABLET ORAL EVERY 6 HOURS
Status: DISPENSED | OUTPATIENT
Start: 2022-03-02 | End: 2022-03-04

## 2022-03-02 RX ADMIN — HYDROMORPHONE HYDROCHLORIDE 0.5 MG: 2 INJECTION, SOLUTION INTRAMUSCULAR; INTRAVENOUS; SUBCUTANEOUS at 09:33

## 2022-03-02 RX ADMIN — SODIUM CHLORIDE 100 ML/HR: 9 INJECTION, SOLUTION INTRAVENOUS at 13:56

## 2022-03-02 RX ADMIN — OXYCODONE AND ACETAMINOPHEN 1 TABLET: 5; 325 TABLET ORAL at 17:13

## 2022-03-02 RX ADMIN — MAGNESIUM SULFATE HEPTAHYDRATE 2 G: 500 INJECTION, SOLUTION INTRAMUSCULAR; INTRAVENOUS at 08:05

## 2022-03-02 RX ADMIN — ACETAMINOPHEN 1000 MG: 500 TABLET ORAL at 06:53

## 2022-03-02 RX ADMIN — CELECOXIB 200 MG: 200 CAPSULE ORAL at 06:54

## 2022-03-02 RX ADMIN — ALLOPURINOL 300 MG: 300 TABLET ORAL at 13:56

## 2022-03-02 RX ADMIN — ROCURONIUM BROMIDE 20 MG: 50 INJECTION INTRAVENOUS at 09:11

## 2022-03-02 RX ADMIN — BUPIVACAINE 10 ML: 13.3 INJECTION, SUSPENSION, LIPOSOMAL INFILTRATION at 07:45

## 2022-03-02 RX ADMIN — ROCURONIUM BROMIDE 45 MG: 50 INJECTION INTRAVENOUS at 07:50

## 2022-03-02 RX ADMIN — CEFOXITIN 2 G: 2 INJECTION, POWDER, FOR SOLUTION INTRAVENOUS at 09:37

## 2022-03-02 RX ADMIN — ACETAMINOPHEN 1000 MG: 500 TABLET ORAL at 20:41

## 2022-03-02 RX ADMIN — DEXAMETHASONE SODIUM PHOSPHATE 4 MG: 10 INJECTION INTRAMUSCULAR; INTRAVENOUS at 08:13

## 2022-03-02 RX ADMIN — FAMOTIDINE 20 MG: 10 INJECTION INTRAVENOUS at 06:54

## 2022-03-02 RX ADMIN — FENTANYL CITRATE 50 MCG: 0.05 INJECTION, SOLUTION INTRAMUSCULAR; INTRAVENOUS at 07:36

## 2022-03-02 RX ADMIN — PREGABALIN 75 MG: 75 CAPSULE ORAL at 06:53

## 2022-03-02 RX ADMIN — FENTANYL CITRATE 50 MCG: 0.05 INJECTION, SOLUTION INTRAMUSCULAR; INTRAVENOUS at 08:23

## 2022-03-02 RX ADMIN — SUCCINYLCHOLINE CHLORIDE 160 MG: 20 INJECTION, SOLUTION INTRAMUSCULAR; INTRAVENOUS; PARENTERAL at 07:38

## 2022-03-02 RX ADMIN — FAMOTIDINE 20 MG: 20 TABLET ORAL at 20:42

## 2022-03-02 RX ADMIN — SODIUM CHLORIDE, POTASSIUM CHLORIDE, SODIUM LACTATE AND CALCIUM CHLORIDE 9 ML/HR: 600; 310; 30; 20 INJECTION, SOLUTION INTRAVENOUS at 06:40

## 2022-03-02 RX ADMIN — ACETAMINOPHEN 1000 MG: 500 TABLET ORAL at 13:56

## 2022-03-02 RX ADMIN — LIDOCAINE HYDROCHLORIDE 80 MG: 20 INJECTION, SOLUTION INFILTRATION; PERINEURAL at 07:38

## 2022-03-02 RX ADMIN — ROCURONIUM BROMIDE 5 MG: 50 INJECTION INTRAVENOUS at 07:38

## 2022-03-02 RX ADMIN — CEFOXITIN 2 G: 2 INJECTION, POWDER, FOR SOLUTION INTRAVENOUS at 07:27

## 2022-03-02 RX ADMIN — KETOROLAC TROMETHAMINE 15 MG: 15 INJECTION, SOLUTION INTRAMUSCULAR; INTRAVENOUS at 20:42

## 2022-03-02 RX ADMIN — INSULIN LISPRO 2 UNITS: 100 INJECTION, SOLUTION INTRAVENOUS; SUBCUTANEOUS at 20:52

## 2022-03-02 RX ADMIN — GLYCOPYRROLATE 1 MG: 0.2 INJECTION INTRAMUSCULAR; INTRAVENOUS at 11:15

## 2022-03-02 RX ADMIN — KETOROLAC TROMETHAMINE 15 MG: 15 INJECTION, SOLUTION INTRAMUSCULAR; INTRAVENOUS at 13:56

## 2022-03-02 RX ADMIN — ROCURONIUM BROMIDE 15 MG: 50 INJECTION INTRAVENOUS at 09:30

## 2022-03-02 RX ADMIN — ALVIMOPAN 12 MG: 12 CAPSULE ORAL at 06:53

## 2022-03-02 RX ADMIN — BUPIVACAINE HYDROCHLORIDE 30 ML: 2.5 INJECTION, SOLUTION EPIDURAL; INFILTRATION; INTRACAUDAL; PERINEURAL at 07:45

## 2022-03-02 RX ADMIN — SODIUM CHLORIDE, POTASSIUM CHLORIDE, SODIUM LACTATE AND CALCIUM CHLORIDE: 600; 310; 30; 20 INJECTION, SOLUTION INTRAVENOUS at 07:31

## 2022-03-02 RX ADMIN — EPHEDRINE SULFATE 10 MG: 50 INJECTION INTRAVENOUS at 07:58

## 2022-03-02 RX ADMIN — ROCURONIUM BROMIDE 15 MG: 50 INJECTION INTRAVENOUS at 10:45

## 2022-03-02 RX ADMIN — EPHEDRINE SULFATE 10 MG: 50 INJECTION INTRAVENOUS at 08:06

## 2022-03-02 RX ADMIN — NEOSTIGMINE METHYLSULFATE 5 MG: 0.5 INJECTION INTRAVENOUS at 11:15

## 2022-03-02 RX ADMIN — PROPOFOL 180 MG: 10 INJECTION, EMULSION INTRAVENOUS at 07:38

## 2022-03-02 RX ADMIN — GLYCOPYRROLATE 0.2 MG: 0.2 INJECTION INTRAMUSCULAR; INTRAVENOUS at 07:36

## 2022-03-02 RX ADMIN — EPHEDRINE SULFATE 5 MG: 50 INJECTION INTRAVENOUS at 09:39

## 2022-03-02 RX ADMIN — SODIUM CHLORIDE, POTASSIUM CHLORIDE, SODIUM LACTATE AND CALCIUM CHLORIDE: 600; 310; 30; 20 INJECTION, SOLUTION INTRAVENOUS at 10:53

## 2022-03-02 NOTE — H&P
CC: Ileal stricture     HPI: 66-year-old male that is here today for follow-up.  The patient is here today for follow-up after undergoing small bowel follow-through for stricture ileum.  He has history of open right hemicolectomy for ileal obstruction in 12/17/2014 for what he was thought to be related to chronic NSAIDs use.  Patient has not been taking NSAIDs since then.  He was seen in my office for changes of bowel habits.  He underwent colonoscopy that show evidence of stricture at the ileocolonic anastomosis.  Pathology report show evidence of ulceration and inflamed granulation tissue with necroinflammatory debris's at the anastomosis.  Inflammatory bowel disease panel was negative.  The patient has been doing very well and has been having normal bowel function.  He is here today for discussion of surgical mention of his ileal stricture     PMH: Hypertension, BPH, chronic anemia, chronic diarrhea, diabetes, gout, headache, hyperlipidemia, hypothyroidism, irritable bladder syndrome, nephrolithiasis, microalbuminuria, nocturia, obesity, vitamin D deficiency     PSH:   -Foot amputation left great toe 19 years ago  -Appendectomy   -Cataract extraction  -Open right hemicolectomy 12/17/2014  -Colonoscopy 4/6/2015, 8/17/2021     MEDS: Sennoside, allopurinol, atorvastatin, metoprolol, Victoza, calcium citrate vitamin D, probiotics, levothyroxine, semaglutide,     ALL: No known drug allergies     FH and SH: Family history is noncontributory.  No family history of Crohn's or ulcerative colitis     Social History   Social History            Socioeconomic History   • Marital status:    • Years of education: 14   Tobacco Use   • Smoking status: Current Every Day Smoker       Packs/day: 1.00       Types: Cigarettes   • Smokeless tobacco: Never Used   Vaping Use   • Vaping Use: Never used   Substance and Sexual Activity   • Alcohol use: Yes       Comment: Minimum consumption   • Drug use: Not Currently       Types:  "Marijuana       Comment: Quit 20 years ago.    • Sexual activity: Yes       Partners: Female            ROS:   Constitutional: denies any weight changes, fatigue or weakness.  HEENT: Denies hearing loss and rhinorrhea  Cardiovascular: denies chest pain, palpitations, edemas.  Respiratory: denies cough, sputum, SOB.  Gastrointestinal: denies N&V, abd pain, reports diarrhea, denies constipation.  Genitourinary: denies dysuria, frequency.  Endocrine: denies cold intolerance, lethargy and flushing.  Hem: denies excessive bruising and postop bleeding.  Musculoskeletal: denies weakness, joint swelling, pain or stiffness.  Neuro: denies seizures, CVA, paresthesia, or peripheral neuropathy.   Skin: denies change in nevi, rashes, masses.     PE:   Vitals:    03/02/22 0613   BP: 125/75   BP Location: Left arm   Patient Position: Lying   Pulse: 58   Resp: 18   Temp: 98.6 °F (37 °C)   TempSrc: Oral   SpO2: 93%   Weight: 107 kg (236 lb 12.8 oz)   Height: 190.5 cm (75\")     Body mass index is 31.32 kg/m².   Alert and oriented ×3, no acute distress.  Head is normocephalic and atraumatic.  Neck is supple there is no thyromegaly or lymphadenopathy  Chest is clear bilaterally there is no added sounds  Regular rate and rhythm, no murmurs  Abdomen is soft and nontender, is nondistended, bowel sounds are positive.  There is no rebound or guarding is and there is no peritoneal signs.  Well-healed midline incision  No clubbing cyanosis or edema in lower or upper extremities     Diagnostic studies:   Small bowel follow-through 8/30/2021 there is a fixed luminal compromise involving the distal 20 cm of terminal ileum proximal to the ileocolonic anastomosis.  There is no evidence of obstruction     Labs 8/30/2021:   -IBD diagnostic panel negative for Crohn's or ulcerative colitis  -Hemoglobin 12.9, otherwise normal CBC  -Sedimentation rate slightly elevated  Glucose 133, otherwise normal CMP     Final Diagnosis   1. Colon, Anastomosis, " Biopsy: Colonic and small bowel type mucosa with                A. Ulceration, inflamed granulation tissue, necroinflammatory debris, and reparative/regenerative epithelial        changes.  B. No dysplasia nor malignancy.  C. No granulomata, diagnostic viral inclusions nor intestinal parasites identified.         Assessment and plan     The patient is a very pleasant 66-year-old male with chronic ileal stricture without any clear reason other than NSAIDs use in the past.  Not sure why he continues to have the ileal stricture right now.  His small bowel follow-through as well as pathology report from colonoscopy was reviewed by me and discussed with the patient.  I think that he has a very long stricture at the ileum that will eventually cause obstruction.  Therefore I recommend he undergoes robotic assisted laparoscopic ileocolonic anastomosis and small bowel resection with anastomosis possible open.  Risk and benefits of procedure including bleeding, infection, intra-abdominal injuries, leak were discussed in detail with the patient that verbalized understanding agree with the plan.  Discussed with him about the need to keep losing weight and quit smoking.      Discussed with patient increased perioperative risks associated with obesity including increased risks of DVT, infection, seromas, poor wound healing and hernias (with abdominal surgery).     Discussed risks of surgery with patient and in particular increased risk of wound infection, poor wound healing, hernias (with abdominal surgery) and post-operative pulmonary complications associated with smoking.

## 2022-03-02 NOTE — BRIEF OP NOTE
COLON RESECTION LAPAROSCOPIC SIGMOID WITH DAVINCI ROBOT  Progress Note    Navarro Bruno  3/2/2022    Pre-op Diagnosis:   Small bowel stricture (HCC) [K56.699]       Post-Op Diagnosis Codes:     * Small bowel stricture (HCC) [K56.699]    Procedure/CPT® Codes:        Procedure(s):  Robotic assisted laparoscopic to Open ileocolic resection with anastomosis    Surgeon(s):  Guille Cedeno MD    Anesthesia: General with Block    Staff:   Circulator: Lachelle Quijano RN; Cindy Michel RN; Gratn Steiner RN  Scrub Person: Celsa Belcher; Chun Dsouza; Ciara Gutierres PCT  Assistant: Aaron Patton CSA  Assistant: Aaron Patton CSA      Estimated Blood Loss: 50 cc    Urine Voided: * No values recorded between 3/2/2022  7:30 AM and 3/2/2022 11:22 AM *    Specimens:                Specimens     ID Source Type Tests Collected By Collected At Frozen?    A Small Intestine Tissue · TISSUE PATHOLOGY EXAM   Guille Cedeno MD 3/2/22 1014 No    Description: ileocolic resection    This specimen was not marked as sent.    B Umbilicus Tissue · TISSUE PATHOLOGY EXAM   Guille Cedeno MD 3/2/22 1102     Description: umbilicus    This specimen was not marked as sent.                Drains:   Urethral Catheter Silicone 16 Fr. (Active)       Findings: Dense adhesions from prior ileocolic resection    Complications: None    Assistant: Aaron Patton CSA  was responsible for performing the following activities: Retraction, Suction, Irrigation, Suturing, Closing, Placing Dressing, Harvesting of Vessels and Held/Positioned Camera and their skilled assistance was necessary for the success of this case.    Guille Cedeno MD     Date: 3/2/2022  Time: 11:22 EST

## 2022-03-02 NOTE — PLAN OF CARE
Goal Outcome Evaluation:      Pt A&O x 4, family at bedside, midline, 5 lap sites, post op colon resection, keller cath, PRN pain meds, clear liquid diet.

## 2022-03-02 NOTE — ANESTHESIA PROCEDURE NOTES
Peripheral Block      Patient location during procedure: OR  Start time: 3/2/2022 7:40 AM  Stop time: 3/2/2022 7:45 AM  Reason for block: at surgeon's request and post-op pain management  Performed by  Anesthesiologist: Stone Kim MD  Preanesthetic Checklist  Completed: patient identified, IV checked, site marked, risks and benefits discussed, surgical consent, monitors and equipment checked, pre-op evaluation and timeout performed  Prep:  Pt Position: supine  Prep: ChloraPrep  Patient monitoring: blood pressure monitoring, continuous pulse oximetry and EKG  Procedure    Sedation: yes    Guidance:ultrasound guided    ULTRASOUND INTERPRETATION. Using ultrasound guidance a 21 G gauge needle was placed in close proximity to the nerve, at which point, under ultrasound guidance anesthetic was injected in the area of the nerve and spread of the anesthesia was seen on ultrasound in close proximity thereto.  There were no abnormalities seen on ultrasound; a digital image was taken; and the patient tolerated the procedure with no complications. Images:still images not obtained    Laterality:Bilateral  Block Type:TAP      Medications Used: bupivacaine liposome (EXPAREL) 1.3 % injection, 10 mL  bupivacaine PF (MARCAINE) 0.25 % injection, 30 mL  Med administered at 3/2/2022 7:45 AM      Post Assessment  Injection Assessment: negative aspiration for heme  Patient Tolerance:comfortable throughout block  Complications:no

## 2022-03-02 NOTE — ANESTHESIA PROCEDURE NOTES
Airway  Urgency: elective    Date/Time: 3/2/2022 7:42 AM  Airway not difficult    General Information and Staff    Patient location during procedure: OR  Anesthesiologist: Stone Kim MD  CRNA: Philippe Mullins CRNA    Indications and Patient Condition  Indications for airway management: airway protection    Preoxygenated: yes  MILS maintained throughout  Mask difficulty assessment: 1 - vent by mask    Final Airway Details  Final airway type: endotracheal airway      Successful airway: ETT  Cuffed: yes   Successful intubation technique: direct laryngoscopy  Endotracheal tube insertion site: oral  Blade: Wolf  Blade size: 3  ETT size (mm): 8.0  Cormack-Lehane Classification: grade I - full view of glottis  Placement verified by: chest auscultation and capnometry   Inital cuff pressure (cm H2O): 22  Cuff volume (mL): 8  Measured from: lips  ETT/EBT  to lips (cm): 24  Number of attempts at approach: 1

## 2022-03-02 NOTE — ANESTHESIA PREPROCEDURE EVALUATION
Anesthesia Evaluation     Patient summary reviewed   NPO Solid Status: > 8 hours             Airway   Mallampati: II  Neck ROM: full  No difficulty expected  Dental      Pulmonary    Cardiovascular     ECG reviewed  Rhythm: regular    (+) hypertension,       Neuro/Psych  GI/Hepatic/Renal/Endo    (+) obesity,   diabetes mellitus, thyroid problem     Musculoskeletal         ROS comment: Gout  Abdominal    Substance History      OB/GYN          Other                        Anesthesia Plan    ASA 3     general with block       Anesthetic plan, all risks, benefits, and alternatives have been provided, discussed and informed consent has been obtained with: patient.  Use of blood products discussed with patient .       CODE STATUS:

## 2022-03-02 NOTE — OP NOTE
Date of procedure: 3/2/2022    Primary Surgeon: Dr. Cedeno    Assistant: Aaron Patton CSA  was responsible for performing the following activities: Retraction, Suction, Irrigation, Suturing, Closing, Placing Dressing, Harvesting of Vessels and Held/Positioned Camera and their skilled assistance was necessary for the success of this case.    Preoperative diagnosis: Ileal stricture    Postoperative diagnosis: Same    Procedure performed:   -Robotic converted to open ileocolonic resection  -Lysis of adhesions of approximately 2 hours    Anesthesia: General plus block    EBL: 50 cc    Complications: None    Findings: Adhesions at the right lower quadrant that prevented easy mobilization robotically.  Case converted to open    Condition of patient: Stable to recovery    Specimen: Ileum and ileocolonic anastomosis    Indications: 66-year-old male with history of prior ileocolic resection for ileal stricture secondary to NSAIDs presented to my office with change of bowel habits.  He was found to have stricture at the ileum prior to the ileocolonic anastomosis.  He was symptomatic.  Discussed with patient about treatment options.  I recommend that he undergoes robotic assisted laparoscopic ileocolonic resection possible open.  Risk and benefits of procedure including bleeding, infection, intra-abdominal injuries, anastomotic leaks were discussed in detail with the patient that verbalized understanding and agree with the plan    Description:   Patient was taken to operative room where he was placed in the OR table in supine position.  Preoperative antibiotics were given and SCDs were placed.  Patient underwent general endotracheal anesthesia.  Patient abdomen was then prepped and draped in usual sterile fashion.  Timeout was performed the patient was correctly identified.  I accessed the abdominal cavity in the left upper quadrant in the subcostal area with Optiview technique and insertion of a 5 mm trocar.  Upon  exploration of the abdominal cavity there was no injury from trocar placement.  There were omental adhesions to the anterior abdominal wall.  I placed another 2 8 mm robotic trochars in the left lower and left and umbilical area, these was done under direct laparoscopic vision.  A 12 mm trocar was placed in the left upper abdomen.  The initial 5 mm trocar was switched by a millimeter trocar, a assistant port of 8 mm was placed in the left flank.  This was done under the laparoscopic vision.  Robotic arms were introduced to operative field and connected to the trochars, instruments were placed under direct vision.  Started procedure by taking adhesions from the omentum to the anterior abdominal wall.  I started trying to take foundations from the small bowel to the prior anastomosis.  There were dense lesions in the right lower quadrant that prevented safe robotic dissection so decided to convert to open procedure.  Robotic instruments were removed and robotic arms were removed from the  operative field.  Trochars were removed.  I perform a midline incision around the umbilical area the incisional cardiothoracic with anesthesia and abdominal wall fascia.  Abdominal cavity was entered, adhesions from the omentum to anterior abdominal wall were taken and completed.  A large wound protector was placed.  Started taking adhesions from the small bowel to the right colon.  Then I proceeded to recognize the area of the ileocolonic anastomosis and this was mobilized slowly from the retroperitoneum.  The ureter was recognized and preserved.  Mobilization of the right colon was then performed lateral to medial up to the body flexure that was taken down through the omental attachments to the transverse colon where taken down with Bovie electrocautery and Enseal device.  Mobilization of the right colon was performed at the rectum medially and the duodenum was recognized and preserved.  I then proceeded to take a lesions from the  small bowel to the small bowel.  This was done with a combination of Bovie electrocautery and Metzenbaum scissors.  I was able to recognize the abnormal ileum that was approximately 20 cm from the prior ileocolonic anastomosis.  With [place] blue load stapler the ileum was transected were healthy approximately 30 cm away from the anastomosis.  Here the small bowel looked normal in caliber and thickness.  An area of resection in the ascending colon was selected and transected with [place] blue load stapler.  I then proceeded to transect the mesentery of the ileum and the colon with Enseal device.  Vascular supply was tied between clamps.  Specimen was removed and sent for pathology, analysis.  Both ends were tested for vascular supply that was excellent.  I then proceeded to perform a side-to-side ileocolonic stapled anastomosis, common enterotomy channel was closed with running interlocking 3-0 chromic and interrupted 2-0 silk Lembert sutures.  Crotch anastomosis working forwards with 2-0 silk suture.  The mesenteric defect was closed with figure of eight 2-0 silk suture.  Hemostasis was achieved irrigation of abdominal cavity was performed.  Seprafilm was placed intra-abdominally.  Because the umbilicus had small amount of purulent fluid I decided to resect the umbilicus to prevent wound infection that would jeopardize patient healing.  This was done with Bovie electrocautery.  I then closed abdominal wall with running 0 PDS suture from the superior inferior portion of the wound.  Skin was closed with staples.  All laparoscopic skin incisions were closed with staples.  Dressings were placed.  Patient was extubated and taken to recovery area in stable condition.  Instrument sponge count were correct.  Patient tolerated procedure well    Guille Cedeno MD, FACS  General, Minimally Invasive and Endoscopic Surgery  [facility]    [address], [address]  [place], [place], [address]  P: [phone]  F:  [phone]

## 2022-03-02 NOTE — ANESTHESIA POSTPROCEDURE EVALUATION
"Patient: Navarro Bruno    Procedure Summary     Date: 03/02/22 Room / Location: [facility] OR 08 /  [facility]    Anesthesia Start: 0731 Anesthesia Stop: 1139    Procedure: Robotic assisted laparoscopic to Open ileocolic resection with anastomosis (N/A Abdomen) Diagnosis:       Small bowel stricture (HCC)      (Small bowel stricture (HCC) [K56.699])    Surgeons: Guille Cedeno MD Provider: Stone Kim MD    Anesthesia Type: general with block ASA Status: 3          Anesthesia Type: general with block    Vitals  Vitals Value Taken Time   /74 03/02/22 1306   Temp 36.6 °C (97.9 °F) 03/02/22 1305   Pulse 64 03/02/22 1315   Resp 16 03/02/22 1305   SpO2 97 % 03/02/22 1315   Vitals shown include unvalidated device data.        Post Anesthesia Care and Evaluation    Patient location during evaluation: bedside  Patient participation: complete - patient participated  Level of consciousness: sleepy but conscious  Pain score: 0  Pain management: adequate  Airway patency: patent  Anesthetic complications: No anesthetic complications    Cardiovascular status: acceptable  Respiratory status: acceptable  Hydration status: acceptable    Comments: /74   Pulse 65   Temp 36.6 °C (97.9 °F) (Oral)   Resp 16   Ht 190.5 cm (75\")   Wt 107 kg (236 lb 12.8 oz)   SpO2 97%   BMI 29.60 kg/m²         "

## 2022-03-03 LAB
ANION GAP SERPL CALCULATED.3IONS-SCNC: 15.6 MMOL/L (ref 5–15)
BASOPHILS # BLD AUTO: 0.01 10*3/MM3 (ref 0–0.2)
BASOPHILS NFR BLD AUTO: 0.1 % (ref 0–1.5)
BUN SERPL-MCNC: 18 MG/DL (ref 8–23)
BUN/CREAT SERPL: 17.6 (ref 7–25)
CALCIUM SPEC-SCNC: 8.4 MG/DL (ref 8.6–10.5)
CHLORIDE SERPL-SCNC: 102 MMOL/L (ref 98–107)
CO2 SERPL-SCNC: 20.4 MMOL/L (ref 22–29)
CREAT SERPL-MCNC: 1.02 MG/DL (ref 0.76–1.27)
DEPRECATED RDW RBC AUTO: 42.6 FL (ref 37–54)
EGFRCR SERPLBLD CKD-EPI 2021: 80.6 ML/MIN/1.73
EOSINOPHIL # BLD AUTO: 0 10*3/MM3 (ref 0–0.4)
EOSINOPHIL NFR BLD AUTO: 0 % (ref 0.3–6.2)
ERYTHROCYTE [DISTWIDTH] IN BLOOD BY AUTOMATED COUNT: 14.1 % (ref 12.3–15.4)
GLUCOSE BLDC GLUCOMTR-MCNC: 160 MG/DL (ref 70–130)
GLUCOSE BLDC GLUCOMTR-MCNC: 169 MG/DL (ref 70–130)
GLUCOSE BLDC GLUCOMTR-MCNC: 180 MG/DL (ref 70–130)
GLUCOSE BLDC GLUCOMTR-MCNC: 195 MG/DL (ref 70–130)
GLUCOSE SERPL-MCNC: 152 MG/DL (ref 65–99)
HCT VFR BLD AUTO: 35.8 % (ref 37.5–51)
HGB BLD-MCNC: 11.9 G/DL (ref 13–17.7)
IMM GRANULOCYTES # BLD AUTO: 0.06 10*3/MM3 (ref 0–0.05)
IMM GRANULOCYTES NFR BLD AUTO: 0.4 % (ref 0–0.5)
LAB AP CASE REPORT: NORMAL
LYMPHOCYTES # BLD AUTO: 1.01 10*3/MM3 (ref 0.7–3.1)
LYMPHOCYTES NFR BLD AUTO: 7.1 % (ref 19.6–45.3)
MCH RBC QN AUTO: 27.8 PG (ref 26.6–33)
MCHC RBC AUTO-ENTMCNC: 33.2 G/DL (ref 31.5–35.7)
MCV RBC AUTO: 83.6 FL (ref 79–97)
MONOCYTES # BLD AUTO: 1.33 10*3/MM3 (ref 0.1–0.9)
MONOCYTES NFR BLD AUTO: 9.4 % (ref 5–12)
NEUTROPHILS NFR BLD AUTO: 11.8 10*3/MM3 (ref 1.7–7)
NEUTROPHILS NFR BLD AUTO: 83 % (ref 42.7–76)
NRBC BLD AUTO-RTO: 0 /100 WBC (ref 0–0.2)
PATH REPORT.FINAL DX SPEC: NORMAL
PATH REPORT.GROSS SPEC: NORMAL
PLATELET # BLD AUTO: 254 10*3/MM3 (ref 140–450)
PMV BLD AUTO: 10 FL (ref 6–12)
POTASSIUM SERPL-SCNC: 4.6 MMOL/L (ref 3.5–5.2)
RBC # BLD AUTO: 4.28 10*6/MM3 (ref 4.14–5.8)
SODIUM SERPL-SCNC: 138 MMOL/L (ref 136–145)
WBC NRBC COR # BLD: 14.21 10*3/MM3 (ref 3.4–10.8)

## 2022-03-03 PROCEDURE — 82962 GLUCOSE BLOOD TEST: CPT

## 2022-03-03 PROCEDURE — 94799 UNLISTED PULMONARY SVC/PX: CPT

## 2022-03-03 PROCEDURE — 25010000002 HEPARIN (PORCINE) PER 1000 UNITS: Performed by: SURGERY

## 2022-03-03 PROCEDURE — 63710000001 INSULIN LISPRO (HUMAN) PER 5 UNITS: Performed by: SURGERY

## 2022-03-03 PROCEDURE — 99024 POSTOP FOLLOW-UP VISIT: CPT | Performed by: SURGERY

## 2022-03-03 PROCEDURE — 80048 BASIC METABOLIC PNL TOTAL CA: CPT | Performed by: SURGERY

## 2022-03-03 PROCEDURE — 85025 COMPLETE CBC W/AUTO DIFF WBC: CPT | Performed by: SURGERY

## 2022-03-03 PROCEDURE — 25010000002 KETOROLAC TROMETHAMINE PER 15 MG: Performed by: SURGERY

## 2022-03-03 RX ADMIN — ALLOPURINOL 300 MG: 300 TABLET ORAL at 08:43

## 2022-03-03 RX ADMIN — FAMOTIDINE 20 MG: 20 TABLET ORAL at 08:44

## 2022-03-03 RX ADMIN — INSULIN LISPRO 2 UNITS: 100 INJECTION, SOLUTION INTRAVENOUS; SUBCUTANEOUS at 22:55

## 2022-03-03 RX ADMIN — FAMOTIDINE 20 MG: 20 TABLET ORAL at 21:02

## 2022-03-03 RX ADMIN — ATORVASTATIN CALCIUM 80 MG: 80 TABLET, FILM COATED ORAL at 08:44

## 2022-03-03 RX ADMIN — INSULIN LISPRO 2 UNITS: 100 INJECTION, SOLUTION INTRAVENOUS; SUBCUTANEOUS at 12:07

## 2022-03-03 RX ADMIN — KETOROLAC TROMETHAMINE 15 MG: 15 INJECTION, SOLUTION INTRAMUSCULAR; INTRAVENOUS at 02:33

## 2022-03-03 RX ADMIN — SODIUM CHLORIDE 100 ML/HR: 9 INJECTION, SOLUTION INTRAVENOUS at 02:12

## 2022-03-03 RX ADMIN — LEVOTHYROXINE SODIUM 50 MCG: 0.05 TABLET ORAL at 08:44

## 2022-03-03 RX ADMIN — HEPARIN SODIUM 5000 UNITS: 5000 INJECTION INTRAVENOUS; SUBCUTANEOUS at 13:59

## 2022-03-03 RX ADMIN — HEPARIN SODIUM 5000 UNITS: 5000 INJECTION INTRAVENOUS; SUBCUTANEOUS at 06:12

## 2022-03-03 RX ADMIN — ACETAMINOPHEN 1000 MG: 500 TABLET ORAL at 02:32

## 2022-03-03 RX ADMIN — KETOROLAC TROMETHAMINE 15 MG: 15 INJECTION, SOLUTION INTRAMUSCULAR; INTRAVENOUS at 13:59

## 2022-03-03 RX ADMIN — ACETAMINOPHEN 1000 MG: 500 TABLET ORAL at 21:02

## 2022-03-03 RX ADMIN — INSULIN LISPRO 2 UNITS: 100 INJECTION, SOLUTION INTRAVENOUS; SUBCUTANEOUS at 08:40

## 2022-03-03 RX ADMIN — KETOROLAC TROMETHAMINE 15 MG: 15 INJECTION, SOLUTION INTRAMUSCULAR; INTRAVENOUS at 08:45

## 2022-03-03 RX ADMIN — INSULIN LISPRO 2 UNITS: 100 INJECTION, SOLUTION INTRAVENOUS; SUBCUTANEOUS at 18:43

## 2022-03-03 RX ADMIN — KETOROLAC TROMETHAMINE 15 MG: 15 INJECTION, SOLUTION INTRAMUSCULAR; INTRAVENOUS at 20:53

## 2022-03-03 RX ADMIN — HEPARIN SODIUM 5000 UNITS: 5000 INJECTION INTRAVENOUS; SUBCUTANEOUS at 21:03

## 2022-03-03 RX ADMIN — METOPROLOL TARTRATE 25 MG: 25 TABLET, FILM COATED ORAL at 06:11

## 2022-03-03 RX ADMIN — ACETAMINOPHEN 1000 MG: 500 TABLET ORAL at 08:43

## 2022-03-03 RX ADMIN — ACETAMINOPHEN 1000 MG: 500 TABLET ORAL at 13:58

## 2022-03-03 NOTE — NURSING NOTE
The patient had abdominal surgery and is POD #1 this morning.Lap site times 5 dry and intact with midline dressing dry as well.  Scheduled Toradol and Tylenol is all that was given for pain last night. Good cough and deep breathing upon request. Vital signs stable. F/C with good clear yellow output. Will continue to monitor.

## 2022-03-03 NOTE — PAYOR COMM NOTE
"INITIAL CLINICAL FOR REVIEW  REF #[id number]  F:  [phone]      Truman Lacy (66 y.o. Male)             Date of Birth Social Security Number Address Home Phone MRN    [id number]  [id number]  [place] [address] [phone] [id number]    Rastafari Marital Status             Moravian        Admission Date Admission Type Admitting Provider Attending Provider Department, Room/Bed    3/2/22 Elective Guille Cedeno MD Gallo, Alberto Sebastian, MD [facility] 3 PARK, P394/1    Discharge Date Discharge Disposition Discharge Destination                         Attending Provider: Guille Cedeno MD    Allergies: No Known Allergies    Isolation: None   Infection: COVID (History) (03/01/22)   Code Status: CPR   Advance Care Planning Activity    Ht: 190.5 cm (75\")   Wt: 107 kg (236 lb 12.8 oz)    Admission Cmt: None   Principal Problem: Small bowel stricture (HCC) [K56.699]                 Active Insurance as of 3/2/2022     Primary Coverage     Payor Plan Insurance Group Employer/Plan Group    AETNA COMMERCIAL AETNA [id number]     Payor Plan Address Payor Plan Phone Number Payor Plan Fax Number Effective Dates    [address] [phone]  1/1/2022 - None Entered    [facility] [id number]       Subscriber Name Subscriber Birth Date Member ID       TRUMAN LACY 1955 [id number]           Secondary Coverage     Payor Plan Insurance Group Employer/Plan Group    MEDICARE MEDICARE A & B      Payor Plan Address Payor Plan Phone Number Payor Plan Fax Number Effective Dates    [address] [phone]  12/1/2019 - None Entered    [facility] [id number]       Subscriber Name Subscriber Birth Date Member ID       TRUMAN LACY 1955 [id number]                 Emergency Contacts      (Rel.) Home Phone Work Phone Mobile Phone    MoravianBhavin (Brother) [phone] -- [phone]               History & Physical      Guille Cedeno " MD Irvin at 03/02/22 0704          CC: Ileal stricture     HPI: 66-year-old male that is here today for follow-up.  The patient is here today for follow-up after undergoing small bowel follow-through for stricture ileum.  He has history of open right hemicolectomy for ileal obstruction in 12/17/2014 for what he was thought to be related to chronic NSAIDs use.  Patient has not been taking NSAIDs since then.  He was seen in my office for changes of bowel habits.  He underwent colonoscopy that show evidence of stricture at the ileocolonic anastomosis.  Pathology report show evidence of ulceration and inflamed granulation tissue with necroinflammatory debris's at the anastomosis.  Inflammatory bowel disease panel was negative.  The patient has been doing very well and has been having normal bowel function.  He is here today for discussion of surgical mention of his ileal stricture     PMH: Hypertension, BPH, chronic anemia, chronic diarrhea, diabetes, gout, headache, hyperlipidemia, hypothyroidism, irritable bladder syndrome, nephrolithiasis, microalbuminuria, nocturia, obesity, vitamin D deficiency     PSH:   -Foot amputation left great toe 19 years ago  -Appendectomy   -Cataract extraction  -Open right hemicolectomy 12/17/2014  -Colonoscopy 4/6/2015, 8/17/2021     MEDS: Sennoside, allopurinol, atorvastatin, metoprolol, Victoza, calcium citrate vitamin D, probiotics, levothyroxine, semaglutide,     ALL: No known drug allergies     FH and SH: Family history is noncontributory.  No family history of Crohn's or ulcerative colitis     Social History   Social History            Socioeconomic History   • Marital status:    • Years of education: 14   Tobacco Use   • Smoking status: Current Every Day Smoker       Packs/day: 1.00       Types: Cigarettes   • Smokeless tobacco: Never Used   Vaping Use   • Vaping Use: Never used   Substance and Sexual Activity   • Alcohol use: Yes       Comment: Minimum consumption  "  • Drug use: Not Currently       Types: Marijuana       Comment: Quit 20 years ago.    • Sexual activity: Yes       Partners: Female            ROS:   Constitutional: denies any weight changes, fatigue or weakness.  HEENT: Denies hearing loss and rhinorrhea  Cardiovascular: denies chest pain, palpitations, edemas.  Respiratory: denies cough, sputum, SOB.  Gastrointestinal: denies N&V, abd pain, reports diarrhea, denies constipation.  Genitourinary: denies dysuria, frequency.  Endocrine: denies cold intolerance, lethargy and flushing.  Hem: denies excessive bruising and postop bleeding.  Musculoskeletal: denies weakness, joint swelling, pain or stiffness.  Neuro: denies seizures, CVA, paresthesia, or peripheral neuropathy.   Skin: denies change in nevi, rashes, masses.     PE:   Vitals:    03/02/22 0613   BP: 125/75   BP Location: Left arm   Patient Position: Lying   Pulse: 58   Resp: 18   Temp: 98.6 °F (37 °C)   TempSrc: Oral   SpO2: 93%   Weight: 107 kg (236 lb 12.8 oz)   Height: 190.5 cm (75\")     Body mass index is 31.32 kg/m².   Alert and oriented ×3, no acute distress.  Head is normocephalic and atraumatic.  Neck is supple there is no thyromegaly or lymphadenopathy  Chest is clear bilaterally there is no added sounds  Regular rate and rhythm, no murmurs  Abdomen is soft and nontender, is nondistended, bowel sounds are positive.  There is no rebound or guarding is and there is no peritoneal signs.  Well-healed midline incision  No clubbing cyanosis or edema in lower or upper extremities     Diagnostic studies:   Small bowel follow-through 8/30/2021 there is a fixed luminal compromise involving the distal 20 cm of terminal ileum proximal to the ileocolonic anastomosis.  There is no evidence of obstruction     Labs 8/30/2021:   -IBD diagnostic panel negative for Crohn's or ulcerative colitis  -Hemoglobin 12.9, otherwise normal CBC  -Sedimentation rate slightly elevated  Glucose 133, otherwise normal CMP     Final " Diagnosis   1. Colon, Anastomosis, Biopsy: Colonic and small bowel type mucosa with                A. Ulceration, inflamed granulation tissue, necroinflammatory debris, and reparative/regenerative epithelial        changes.  B. No dysplasia nor malignancy.  C. No granulomata, diagnostic viral inclusions nor intestinal parasites identified.         Assessment and plan     The patient is a very pleasant 66-year-old male with chronic ileal stricture without any clear reason other than NSAIDs use in the past.  Not sure why he continues to have the ileal stricture right now.  His small bowel follow-through as well as pathology report from colonoscopy was reviewed by me and discussed with the patient.  I think that he has a very long stricture at the ileum that will eventually cause obstruction.  Therefore I recommend he undergoes robotic assisted laparoscopic ileocolonic anastomosis and small bowel resection with anastomosis possible open.  Risk and benefits of procedure including bleeding, infection, intra-abdominal injuries, leak were discussed in detail with the patient that verbalized understanding agree with the plan.  Discussed with him about the need to keep losing weight and quit smoking.      Discussed with patient increased perioperative risks associated with obesity including increased risks of DVT, infection, seromas, poor wound healing and hernias (with abdominal surgery).     Discussed risks of surgery with patient and in particular increased risk of wound infection, poor wound healing, hernias (with abdominal surgery) and post-operative pulmonary complications associated with smoking.         Electronically signed by Guille Cedeno MD at 03/02/22 0704       {Outbreak/Travel/Exposure Documentation......;  Question Available Choices Patient Response   COVID-19 Outbreak Screen:  Do you currently have a new onset of the following symptoms?        Fever/Chills, Cough, Shortness of air, Loss of taste  or smell, No, Unknown  No (03/02/22 1407)   COVID-19 Outbreak Screen: In the last 14 days, have you had contact with anyone who is ill, has show any of the symptoms listed above and/or has been diagnosis with the 2019 Novel Coronavirus? This includes any immediate household members but excludes any patients with whom you have been in contact within your normal work duties wearing proper PPE, if you are a healthcare worker.  Yes, No, Unknown              No (03/02/22 1407)   COVID-19 Outbreak Screen: Who was notified? Free text (not recorded)   Ebola Screening Outbreak Screen: Have you traveled to the [place] or [place] within the past 21 days?  Yes, No, Unknown (not recorded)   Ebola Screening Outbreak Screen: Do you have ANY of the following symptoms: Fever/Chills, Vomiting, Diarrhea, Fatigue, Headache, Muscle pain, Unexplained bleeding, Abdominal (stomach) pain, No, Unknown (not recorded)   Ebola Screening Outbreak Screen: Name of Person notified Free text (not recorded)   Travel Screen: Have you traveled in the last month? If so, to what country have you traveled? If [place] what state? Yes, No, Unknown  List of all countries  List of all States No (03/02/22 1407)  (not recorded)  (not recorded)   Infection Risk: Do you currently have the following symptoms?  (If cough is selected, the Tuberculosis Screen is performed.) Cough, Fever, Rash, No No (03/02/22 1407)   Tuberculosis Screen: Do you have any of the following Tuberculosis Risks?  · Have you lived or spent time with anyone who had or may have TB?  · Have you lived in or visited any of the following areas for more than one month: [place], [place], [place], [place] or [place], the [place] or [place]?  · Do you have HIV/AIDS?  · Have you lived in or worked in a nursing home, homeless shelter, correctional facility, or substance abuse treatment facility?   · No    If Yes do you have any of the following symptoms? Yes responses  display to the right    If Yes, symptoms listed are:  Cough greater than or equal to 3 weeks, Loss of appetite, Unexplained weight loss, Night sweats, Bloody sputum or hemoptysis, Hoarseness, Fever, Fatigue, Chest pain, No (not recorded)  (not recorded)   Exposure Screen: Have you been exposed to any of these contagious diseases in the last month? Measles, Chickenpox, Meningitis, Pertussis, Whooping Cough, No No (03/02/22 1407)       Vital Signs (last day)     Date/Time Temp Temp src Pulse Resp BP Patient Position SpO2    03/03/22 0750 97.6 (36.4) Oral 70 18 132/73 Lying 92    03/03/22 0402 97.9 (36.6) Oral 86 18 133/78 Lying 93    03/02/22 2356 97 (36.1) Oral 81 18 132/80 Lying 95    03/02/22 1958 97.1 (36.2) Oral 93 16 138/80 Lying 95    03/02/22 1438 97.5 (36.4) Oral 67 16 135/80 Lying 93    03/02/22 1325 97.1 (36.2) Oral 67 16 115/70 Lying 94    03/02/22 1305 97.9 (36.6) Oral 65 16 132/74 -- 97    03/02/22 1250 -- -- 62 16 130/73 -- 96    03/02/22 1235 -- -- 55 16 135/72 -- 95    03/02/22 1220 -- -- 55 16 133/76 -- 94    03/02/22 1205 -- -- 55 16 135/76 -- 96    03/02/22 1150 -- -- 64 16 137/80 -- 98    03/02/22 1145 -- -- 66 16 130/88 -- 97    03/02/22 1140 -- -- 67 16 136/105 -- 97    03/02/22 1136 98.1 (36.7) Oral 70 16 137/82 Lying 95    03/02/22 0613 98.6 (37) Oral 58 18 125/75 Lying 93          Oxygen Therapy (last day)     Date/Time SpO2 Device (Oxygen Therapy) Flow (L/min) Oxygen Concentration (%) ETCO2 (mmHg)    03/03/22 0750 92 nasal cannula 2 -- --    03/03/22 0402 93 nasal cannula 2 -- --    03/02/22 2356 95 nasal cannula 2 -- --    03/02/22 2041 -- nasal cannula 2 -- --    03/02/22 1958 95 nasal cannula 2 -- --    03/02/22 1438 93 nasal cannula 2 -- --    03/02/22 1400 -- nasal cannula 2 -- --    03/02/22 1325 94 -- -- -- --    03/02/22 1305 97 nasal cannula 2 -- --    03/02/22 1250 96 nasal cannula 2 -- --    03/02/22 1235 95 nasal cannula 2 -- --    03/02/22 1220 94 nasal cannula 2 -- --     03/02/22 1205 96 nasal cannula 2 -- --    03/02/22 1150 98 nasal cannula 4 -- --    03/02/22 1145 97 nasal cannula 4 -- --    03/02/22 1140 97 nasal cannula 4 -- --    03/02/22 1136 95 nasal cannula 4 -- --    03/02/22 0613 93 room air -- -- --          Lines, Drains & Airways     Active LDAs     Name Placement date Placement time Site Days    Peripheral IV 03/02/22 0640 Left; Posterior Hand 03/02/22  0640  Hand  1    Peripheral IV 03/02/22 0641 Posterior; Right Hand 03/02/22  0641  Hand  1    Urethral Catheter Silicone 16 Fr. 03/02/22  0755   1                  Medication Administration Report for Navarro Bruno as of 03/03/22 1008   Legend:    Given Hold Not Given Due Canceled Entry Other Actions    Time Time (Time) Time  Time-Action       Discontinued     Completed     Future     MAR Hold     Linked           Medications 03/02/22 03/03/22    acetaminophen (TYLENOL) tablet 1,000 mg  Dose: 1,000 mg  Freq: Every 6 Hours Route: PO  Start: 03/02/22 1430   End: 03/04/22 1429   Admin Instructions:   Do not exceed 4 grams of acetaminophen in a 24 hr period. Max dose of 2gm for AST/ALT greater than 120 units/L      If given for pain, use the following pain scale:   Mild Pain = Pain Score of 1-3, CPOT 1-2  Moderate Pain = Pain Score of 4-6, CPOT 3-4  Severe Pain = Pain Score of 7-10, CPOT 5-8       1356-Given     2041-Given              0232-Given     0843-Given     1430     2030            allopurinol (ZYLOPRIM) tablet 300 mg  Dose: 300 mg  Freq: Daily Route: PO  Start: 03/02/22 1142   Admin Instructions:   (BKC) Take with food if GI upset occurs.       1356-Given               0843-Given               atorvastatin (LIPITOR) tablet 80 mg  Dose: 80 mg  Freq: Daily Route: PO  Start: 03/02/22 1142   Admin Instructions:   Avoid grapefruit juice.       1142               0844-Given               famotidine (PEPCID) tablet 20 mg  Dose: 20 mg  Freq: 2 Times Daily Route: PO  Start: 03/02/22 2100 2042-Given                0844-Given     2100              heparin (porcine) 5000 UNIT/ML injection 5,000 Units  Dose: 5,000 Units  Freq: Every 8 Hours Scheduled Route: SC  Indications Comment: Prophylaxis of Venous Thromboembolism  Start: 03/03/22 0600        0612-Given     1400     2200             insulin lispro (ADMELOG) injection 0-9 Units  Dose: 0-9 Units  Freq: 4 Times Daily Before Meals & Nightly Route: SC  Start: 03/02/22 1730   Admin Instructions:   Correction - Moderate Dose.  40-60 units/day total insulin dose or average weight, on oral agents    Blood glucose 150-199 mg/dL - 2 units  Blood glucose 200-249 mg/dL - 4 units  Blood glucose 250-299 mg/dL - 6 units  Blood glucose 300-349 mg/dL - 7 units  Blood glucose 350-400 mg/dL - 8 units  Blood glucose greater than 400 mg/dk - 9 units and call provider   Caution: Look alike/sound alike drug alert       (1742)-Not Given     2052-Given [C]              0840-Given     1130     1730     2100            ketorolac (TORADOL) injection 15 mg  Dose: 15 mg  Freq: Every 6 Hours Route: IV  Start: 03/02/22 1430   End: 03/04/22 1429   Admin Instructions:       If given for pain, use the following pain scale:  Mild Pain = Pain Score of 1-3, CPOT 1-2  Moderate Pain = Pain Score of 4-6, CPOT 3-4  Severe Pain = Pain Score of 7-10, CPOT 5-8       1356-Given     2042-Given              0233-Given     0845-Given     1430 2030            levothyroxine (SYNTHROID, LEVOTHROID) tablet 50 mcg  Dose: 50 mcg  Freq: Daily Route: PO  Start: 03/02/22 1142   Admin Instructions:   Take on empty stomach.       1142               0844-Given               metoprolol tartrate (LOPRESSOR) tablet 25 mg  Dose: 25 mg  Freq: Every Morning Route: PO  Start: 03/02/22 1142       (1339)-Not Given [C]               0611-Given              Completed Medications  Medications 03/02/22 03/03/22       alvimopan (ENTEREG) capsule 12 mg  Dose: 12 mg  Freq: Once Route: PO  Start: 03/02/22 0604   End: 03/02/22 0653   Admin  Instructions:   Contraindicated in end stage renal failure or severe hepatic impairment.   Administer 30 minutes to 5 hours prior to surgery  Inpatient use only, max 15 total doses (pre and post op).  Discontinue after first BM or when narcotics have been discontinued.   Order specific questions:   Is this medication for a partial large or small bowel resection with primary anastomosis? Yes  Has the patient recieved daily opioids for the past 7 days? No         0653-Given                ceFOXitin (MEFOXIN) 2 g/100 mL NS (MBP)  Dose: 2 g  Freq: 30 min pre-op Route: IV  Indications of Use: PERIOPERATIVE PHARMACOPROPHYLAXIS  Start: 03/02/22 0602   End: 03/02/22 0937   Admin Instructions:   Redose 2 hours from pre-op dose if procedure ongoing.  Caution: Look alike/sound alike drug alert                Activate vial before using.       0727-New Bag     0937-Given               famotidine (PEPCID) injection 20 mg  Dose: 20 mg  Freq: 60 Minutes Pre-Op Route: IV  Start: 03/02/22 0627   End: 03/02/22 0654   Admin Instructions:   Dilute to 10 mL total volume and give IV push over 2 minutes.       0654-Given                pregabalin (LYRICA) capsule 75 mg  Dose: 75 mg  Freq: Once Route: PO  Start: 03/02/22 0604   End: 03/02/22 0653   Admin Instructions:          0653-Given               Discontinued Medications  Medications 03/02/22 03/03/22       acetaminophen (TYLENOL) tablet 1,000 mg  Dose: 1,000 mg  Freq: Every 6 Hours Route: PO  Start: 03/02/22 0604   End: 03/02/22 1148   Admin Instructions:   Do not exceed 4 grams of acetaminophen in a 24 hr period. Max dose of 2gm for AST/ALT greater than 120 units/L      If given for pain, use the following pain scale:   Mild Pain = Pain Score of 1-3, CPOT 1-2  Moderate Pain = Pain Score of 4-6, CPOT 3-4  Severe Pain = Pain Score of 7-10, CPOT 5-8       0653-Given                celecoxib (CeleBREX) capsule 200 mg  Dose: 200 mg  Freq: Every 12 Hours Scheduled Route: PO  Start:  03/02/22 0900   End: 03/02/22 1148   Admin Instructions:   Take with food if GI upset occurs.  If given for pain, use the following pain scale:  Mild Pain = Pain Score of 1-3, CPOT 1-2  Moderate Pain = Pain Score of 4-6, CPOT 3-4  Severe Pain = Pain Score of 7-10, CPOT 5-8       0654-Given [C]     0900               sodium chloride 0.9 % flush 10 mL  Dose: 10 mL  Freq: Every 12 Hours Scheduled Route: IV  Start: 03/02/22 0900   End: 03/02/22 1148       0900                     ,   Medication Administration Report for Navarro Bruno as of 03/03/22 1008   Legend:    Given Hold Not Given Due Canceled Entry Other Actions    Time Time (Time) Time  Time-Action       Discontinued     Completed     Future     MAR Hold     Linked           Medications 03/02/22 03/03/22    sodium chloride 0.9 % infusion  Rate: 100 mL/hr Dose: 100 mL/hr  Freq: Continuous Route: IV  Start: 03/02/22 1430       1356-New Bag               0212-New Bag              Discontinued Medications  Medications 03/02/22 03/03/22       lactated ringers infusion  Rate: 9 mL/hr Dose: 9 mL/hr  Freq: Continuous PRN Route: IV  PRN Comment: Start prior to surgery  Start: 03/02/22 0627   End: 03/02/22 1324       0640-New Bag     0731-Currently Infusing     0733-Rate/Dose Change     1052-Anesthesia Volume Adjustment     1053-New Bag       1132-Anesthesia Volume Adjustment                      and   Medication Administration Report for DamionNavarro as of 03/03/22 1008   Legend:    Given Hold Not Given Due Canceled Entry Other Actions    Time Time (Time) Time  Time-Action       Discontinued     Completed     Future     MAR Hold     Linked           Medications 03/02/22 03/03/22    HYDROmorphone (DILAUDID) injection 0.5 mg  Dose: 0.5 mg  Freq: Every 2 Hours PRN Route: IV  PRN Reason: Severe Pain   Start: 03/02/22 1332   End: 03/09/22 1331   Admin Instructions:   If given for pain, use the following pain scale:  Mild Pain = Pain Score of 1-3, CPOT 1-2  Moderate  Pain = Pain Score of 4-6, CPOT 3-4  Severe Pain = Pain Score of 7-10, CPOT 5-8        And  naloxone (NARCAN) injection 0.4 mg  Dose: 0.4 mg  Freq: Every 5 Minutes PRN Route: IV  PRN Reason: Respiratory Depression  Start: 03/02/22 1332   Admin Instructions:   If respiratory rate is less than 8 breaths/minute or patient is difficult to arouse stop any narcotics and contact physician.   Administer slow IV push. Repeat as ordered until patient's respiratory rate is greater than 12 breaths/minute.         naproxen (NAPROSYN) tablet 250 mg  Dose: 250 mg  Freq: 2 Times Daily PRN Route: PO  PRN Reason: Mild Pain   Start: 03/02/22 1332   Admin Instructions:   Take with food.  If given for pain, use the following pain scale:  Mild Pain = Pain Score of 1-3, CPOT 1-2  Moderate Pain = Pain Score of 4-6, CPOT 3-4  Severe Pain = Pain Score of 7-10, CPOT 5-8         ondansetron (ZOFRAN) tablet 4 mg  Dose: 4 mg  Freq: Every 6 Hours PRN Route: PO  PRN Reasons: Nausea,Vomiting  Start: 03/02/22 1332   Admin Instructions:   Give Zofran first. Give Phenergan if Zofran not effective. Then if Phenergan not effective, give metoclopramide.        Or  ondansetron (ZOFRAN) injection 4 mg  Dose: 4 mg  Freq: Every 6 Hours PRN Route: IV  PRN Reasons: Nausea,Vomiting  Start: 03/02/22 1332   Admin Instructions:   Give Zofran first. Give Phenergan if Zofran not effective. Then if Phenergan not effective, give metoclopramide.         oxyCODONE-acetaminophen (PERCOCET) 5-325 MG per tablet 1 tablet  Dose: 1 tablet  Freq: Every 4 Hours PRN Route: PO  PRN Reason: Moderate Pain   Start: 03/02/22 1332   End: 03/09/22 1331   Admin Instructions:   [SEFERINO]    Do not exceed 4 grams of acetaminophen in a 24 hr period. Max dose of 2gm for AST/ALT greater than 120 units/L        If given for pain, use the following pain scale:   Mild Pain = Pain Score of 1-3, CPOT 1-2  Moderate Pain = Pain Score of 4-6, CPOT 3-4  Severe Pain = Pain Score of 7-10, CPOT 5-8        1713-Given               Discontinued Medications  Medications 03/02/22 03/03/22       albuterol (PROVENTIL) nebulizer solution 0.083% 2.5 mg/3mL  Dose: 2.5 mg  Freq: Once As Needed Route: NEBULIZATION  PRN Reasons: Wheezing,Shortness of Air  PRN Comment: bronchospasm  Start: 03/02/22 1125   End: 03/02/22 1324         bupivacaine-EPINEPHrine PF (MARCAINE w/EPI) 0.5% [id number] injection  Freq: As Needed  Start: 03/02/22 0815   End: 03/02/22 1133       0815-Given                diphenhydrAMINE (BENADRYL) capsule 25 mg  Dose: 25 mg  Freq: Every 30 Minutes PRN Route: PO  PRN Reason: Itching  PRN Comment: May repeat x 1  Indications of Use: EXTRAPYRAMIDAL REACTION,PRURITUS  Start: 03/02/22 1125   End: 03/02/22 1324   Admin Instructions:   Caution: Look alike/sound alike drug alert. This med may be ordered in other forms and routes. Before giving verify the last time the drug was given by any route/form.           diphenhydrAMINE (BENADRYL) injection 12.5 mg  Dose: 12.5 mg  Freq: Every 15 Minutes PRN Route: IV  PRN Reason: Itching  PRN Comment: May repeat x 1  Start: 03/02/22 1125   End: 03/02/22 1324   Admin Instructions:   Caution: Look alike/sound alike drug alert. This med may be ordered in other forms and routes. Before giving verify the last time the drug was given by any route/form.           ePHEDrine injection 5 mg  Dose: 5 mg  Freq: Once As Needed Route: IV  PRN Comment: symptomatic hypotension - Notify attending anesthesiologist if this needs to be given  Start: 03/02/22 1125   End: 03/02/22 1324   Admin Instructions:   Caution: Look alike/sound alike drug alert   Dilute with NS to 5-10 mg/mL.  Central line preferred, if unavailable use large bore IV access with frequent nurse monitoring of IV site.         fentaNYL citrate (PF) (SUBLIMAZE) injection 50 mcg  Dose: 50 mcg  Freq: Every 5 Minutes PRN Route: IV  PRN Reasons: Moderate Pain ,Severe Pain   Start: 03/02/22 1125   End: 03/02/22 1324   Admin  Instructions:   May alternate fentanyl with hydromorphone using fentanyl first.    Maximum total dose of fentanyl is 200 mcg.  If given for pain, use the following pain scale:  Mild Pain = Pain Score of 1-3, CPOT 1-2  Moderate Pain = Pain Score of 4-6, CPOT 3-4  Severe Pain = Pain Score of 7-10, CPOT 5-8         flumazenil (ROMAZICON) injection 0.2 mg  Dose: 0.2 mg  Freq: As Needed Route: IV  PRN Comment: for benzodiazepine induced unresponsiveness or sedation  Indications of Use: BENZODIAZEPINE-INDUCED SEDATION  Start: 03/02/22 1125   End: 03/02/22 1324   Admin Instructions:   Notify Anesthesia if given  ** give IV over 15-30 seconds **         hydrALAZINE (APRESOLINE) injection 5 mg  Dose: 5 mg  Freq: Every 10 Minutes PRN Route: IV  PRN Reason: High Blood Pressure  PRN Comment: for systolic blood pressure greater than 180 mmHg or diastolic blood pressure greater than 105 mmHg  Start: 03/02/22 1125   End: 03/02/22 1324   Admin Instructions:   Up to 20 mg.  Caution: Look alike/sound alike drug alert         HYDROcodone-acetaminophen (NORCO) 7.5-325 MG per tablet 1 tablet  Dose: 1 tablet  Freq: Once As Needed Route: PO  PRN Reason: Moderate Pain   Start: 03/02/22 1125   End: 03/02/22 1324   Admin Instructions:   [SEFERINO]    Do not exceed 4 grams of acetaminophen in a 24 hr period. Max dose of 2gm for AST/ALT greater than 120 units/L        If given for pain, use the following pain scale:   Mild Pain = Pain Score of 1-3, CPOT 1-2  Moderate Pain = Pain Score of 4-6, CPOT 3-4  Severe Pain = Pain Score of 7-10, CPOT 5-8         HYDROmorphone (DILAUDID) injection 0.25 mg  Dose: 0.25 mg  Freq: Every 5 Minutes PRN Route: IV  PRN Reasons: Moderate Pain ,Severe Pain   Start: 03/02/22 1125   End: 03/02/22 1324   Admin Instructions:   May alternate fentanyl with hydromorphone using fentanyl first.    Maximum total dose of hydromorphone is 2 mg.  If given for pain, use the following pain scale:  Mild Pain = Pain Score of 1-3, CPOT  1-2  Moderate Pain = Pain Score of 4-6, CPOT 3-4  Severe Pain = Pain Score of 7-10, CPOT 5-8         ibuprofen (ADVIL,MOTRIN) tablet 600 mg  Dose: 600 mg  Freq: Once As Needed Route: PO  PRN Reason: Mild Pain   Start: 03/02/22 1125   End: 03/02/22 1324   Admin Instructions:   If given for pain, use the following pain scale:  Mild Pain = Pain Score of 1-3, CPOT 1-2  Moderate Pain = Pain Score of 4-6, CPOT 3-4  Severe Pain = Pain Score of 7-10, CPOT 5-8         labetalol (NORMODYNE,TRANDATE) injection 5 mg  Dose: 5 mg  Freq: Every 5 Minutes PRN Route: IV  PRN Reason: High Blood Pressure  PRN Comment: for systolic blood pressure greater than 180 mmHg or diastolic blood pressure greater than 105 mmHg  Start: 03/02/22 1125   End: 03/02/22 1324   Admin Instructions:   Hold for heart rate less than 60.  Give by slow IV Push each 20mg (or less) over 2 minutes         lactated ringers infusion  Rate: 9 mL/hr Dose: 9 mL/hr  Freq: Continuous PRN Route: IV  PRN Comment: Start prior to surgery  Start: 03/02/22 0627   End: 03/02/22 1324       0640-New Bag     0731-Currently Infusing     0733-Rate/Dose Change     1052-Anesthesia Volume Adjustment     1053-New Bag       1132-Anesthesia Volume Adjustment                midazolam (VERSED) injection 1 mg  Dose: 1 mg  Freq: Every 10 Minutes PRN Route: IV  PRN Comment: Anxiety prophylaxis, Pre-op comfort  Start: 03/02/22 0626   End: 03/02/22 1148   Admin Instructions:   May repeat dose in 10 minutes one time then contact provider for additional orders.           naloxone (NARCAN) injection 0.2 mg  Dose: 0.2 mg  Freq: As Needed Route: IV  PRN Reasons: Opioid Reversal,Respiratory Depression  PRN Comment: unresponsiveness, decrease oxygen saturation  Indications of Use: ACUTE RESPIRATORY FAILURE,OPIOID-INDUCED RESPIRATORY DEPRESSION  Start: 03/02/22 1125   End: 03/02/22 1324   Admin Instructions:   Notify Anesthesia if given         ondansetron (ZOFRAN) injection 4 mg  Dose: 4 mg  Freq:  Once As Needed Route: IV  PRN Reasons: Nausea,Vomiting  Indications of Use: POSTOPERATIVE NAUSEA AND VOMITING  Start: 03/02/22 1125   End: 03/02/22 1324   Admin Instructions:   If BOTH ondansetron (ZOFRAN) and promethazine (PHENERGAN) are ordered use ondansetron first and THEN promethazine IF ondansetron is ineffective.         oxyCODONE-acetaminophen (PERCOCET) 7.5-325 MG per tablet 1 tablet  Dose: 1 tablet  Freq: Every 4 Hours PRN Route: PO  PRN Reason: Severe Pain   Start: 03/02/22 1125   End: 03/02/22 1324   Admin Instructions:   [SEFERINO]    Do not exceed 4 grams of acetaminophen in a 24 hr period. Max dose of 2gm for AST/ALT greater than 120 units/L        If given for pain, use the following pain scale:   Mild Pain = Pain Score of 1-3, CPOT 1-2  Moderate Pain = Pain Score of 4-6, CPOT 3-4  Severe Pain = Pain Score of 7-10, CPOT 5-8         promethazine (PHENERGAN) suppository 25 mg  Dose: 25 mg  Freq: Once As Needed Route: RE  PRN Reasons: Nausea,Vomiting  Start: 03/02/22 1125   End: 03/02/22 1324   Admin Instructions:   If BOTH ondansetron (ZOFRAN) and promethazine (PHENERGAN) are ordered use ondansetron first and THEN promethazine IF ondansetron is ineffective.        Or  promethazine (PHENERGAN) tablet 25 mg  Dose: 25 mg  Freq: Once As Needed Route: PO  PRN Reasons: Nausea,Vomiting  Start: 03/02/22 1125   End: 03/02/22 1324   Admin Instructions:   If BOTH ondansetron (ZOFRAN) and promethazine (PHENERGAN) are ordered use ondansetron first and THEN promethazine IF ondansetron is ineffective.           sodium chloride (NS) irrigation solution  Freq: As Needed  Start: 03/02/22 0858   End: 03/02/22 1133       0858-Given                sodium chloride 0.9 % flush 10 mL  Dose: 10 mL  Freq: As Needed Route: IV  PRN Reason: Line Care  Start: 03/02/22 0626   End: 03/02/22 1148         sterile water irrigation solution  Freq: As Needed  Start: 03/02/22 0816   End: 03/02/22 1133       0816-Given     1053-Given                         Lab Results (last 24 hours)     Procedure Component Value Units Date/Time    Basic Metabolic Panel [[id number]]  (Abnormal) Collected: 03/03/22 0725    Specimen: Blood Updated: 03/03/22 0912     Glucose 152 mg/dL      BUN 18 mg/dL      Creatinine 1.02 mg/dL      Sodium 138 mmol/L      Potassium 4.6 mmol/L      Chloride 102 mmol/L      CO2 20.4 mmol/L      Calcium 8.4 mg/dL      BUN/Creatinine Ratio 17.6     Anion Gap 15.6 mmol/L      eGFR 80.6 mL/min/1.73      Comment: National Kidney Foundation and American Society of Nephrology (ASN) Task Force recommended calculation based on the Chronic Kidney Disease Epidemiology Collaboration (CKD-EPI) equation refit without adjustment for race.       Narrative:      GFR Normal >60  Chronic Kidney Disease <60  Kidney Failure <15      POC Glucose Once [[id number]]  (Abnormal) Collected: 03/03/22 0829    Specimen: Blood Updated: 03/03/22 0831     Glucose 160 mg/dL      Comment: Meter: [id number] : [id number] Keely WEBER       CBC & Differential [[id number]]  (Abnormal) Collected: 03/03/22 0725    Specimen: Blood Updated: 03/03/22 0824    Narrative:      The following orders were created for panel order CBC & Differential.  Procedure                               Abnormality         Status                     ---------                               -----------         ------                     CBC Auto Differential[[id number]]        Abnormal            Final result                 Please view results for these tests on the individual orders.    CBC Auto Differential [[id number]]  (Abnormal) Collected: 03/03/22 0725    Specimen: Blood Updated: 03/03/22 0824     WBC 14.21 10*3/mm3      RBC 4.28 10*6/mm3      Hemoglobin 11.9 g/dL      Hematocrit 35.8 %      MCV 83.6 fL      MCH 27.8 pg      MCHC 33.2 g/dL      RDW 14.1 %      RDW-SD 42.6 fl      MPV 10.0 fL      Platelets 254 10*3/mm3      Neutrophil % 83.0 %      Lymphocyte % 7.1 %      Monocyte % 9.4 %       Eosinophil % 0.0 %      Basophil % 0.1 %      Immature Grans % 0.4 %      Neutrophils, Absolute 11.80 10*3/mm3      Lymphocytes, Absolute 1.01 10*3/mm3      Monocytes, Absolute 1.33 10*3/mm3      Eosinophils, Absolute 0.00 10*3/mm3      Basophils, Absolute 0.01 10*3/mm3      Immature Grans, Absolute 0.06 10*3/mm3      nRBC 0.0 /100 WBC     POC Glucose Once [[id number]]  (Abnormal) Collected: 03/02/22 2039    Specimen: Blood Updated: 03/02/22 2044     Glucose 171 mg/dL      Comment: Meter: [id number] : [id number] John Fern GUS       POC Glucose Once [[id number]]  (Abnormal) Collected: 03/02/22 1437    Specimen: Blood Updated: 03/02/22 1438     Glucose 156 mg/dL      Comment: Meter: [id number] : [id number] Gibson Hanson CNA       Tissue Pathology Exam [[id number]] Collected: 03/02/22 1014    Specimen: Tissue from Small Intestine, Tissue from Umbilicus Updated: 03/02/22 1202    POC Glucose Once [[id number]]  (Abnormal) Collected: 03/02/22 1142    Specimen: Blood Updated: 03/02/22 1144     Glucose 160 mg/dL      Comment: Meter: [id number] : [id number] Anyi CONWAY RN             Orders (active)      Start     Ordered    03/03/22 0800  Ambulate Patient - Five Times Daily  5 Times Daily         03/02/22 1332    03/03/22 0600  heparin (porcine) 5000 UNIT/ML injection 5,000 Units  Every 8 Hours Scheduled         03/02/22 1332    03/03/22 0000  Advance Diet as Tolerated  Until Discontinued         03/02/22 1332    03/02/22 2100  famotidine (PEPCID) tablet 20 mg  2 Times Daily         03/02/22 1332    03/02/22 1730  insulin lispro (ADMELOG) injection 0-9 Units  4 Times Daily Before Meals & Nightly         03/02/22 1332    03/02/22 1600  Strict Intake and Output  Every 4 Hours       03/02/22 1332    03/02/22 1600  Snack: Chewing gum x30 minutes three times daily to increase saliva flow and provide gas pain relief. Do not give to ileostomy patients.  3 Times Daily      Comments: Chewing gum x30 minutes three times  "daily to increase saliva flow and provide gas pain relief.      Do not give to ileostomy patients.    03/02/22 1332    03/02/22 1430  acetaminophen (TYLENOL) tablet 1,000 mg  Every 6 Hours         03/02/22 1332    03/02/22 1430  sodium chloride 0.9 % infusion  Continuous         03/02/22 1332    03/02/22 1430  ketorolac (TORADOL) injection 15 mg  Every 6 Hours         03/02/22 1332    03/02/22 1417  Assess Need for Indwelling Urinary Catheter - Follow Removal Protocol  Continuous        Comments: Indwelling Urinary Catheter Removal Criteria  Discontinue Indwelling Urinary Catheter Unless One of the Following is Present:  Urinary Retention or Obstruction  Chronic Urinary Catheter Use  End of Life  Critical Illness with Strict I/O   Tract or Abdominal Surgery  Stage 3/4 Sacral / Perineal Wound  Required Activity Restriction: Trauma  Required Activity Restriction: Spine Surgery  If Patient is Being Followed by Urology Contact Them PRIOR to Removal  Do Not Remove Indwelling Urinary Catheter Order is Present with a CLINICAL REASON to Maintain the Catheter. Provider is Required to Include a Clinical Reason to Maintain a Urinary Catheter    Patient Admitted With Indwelling Urinary Catheter (Not Placed at [facility])  Assess for Continued Need & Document Medical Necessity  If Infection is Suspected, Contact the Provider       \"And\" Linked Group Details    03/02/22 1416    03/02/22 1416  Urinary Catheter Care  Every Shift      \"And\" Linked Group Details    03/02/22 1416    03/02/22 1400  Turn Cough & Deep Breathe  Every Hour       03/02/22 1332    03/02/22 1333  Turn, Cough & Deep Breathe  Every Shift       03/02/22 1332    03/02/22 1333  Ambulate From Stretcher to Bed upon Arrival to Floor  Once         03/02/22 1332    03/02/22 1333  Ambulate Patient Evening of Surgery  Once         03/02/22 1332    03/02/22 1333  Incentive Spirometry  Every Hour While Awake       03/02/22 1332    03/02/22 1333  Oxygen Therapy- " "Nasal Cannula; 2 LPM; Titrate for SPO2: 90%  Continuous         03/02/22 1332    03/02/22 1333  Diet Clear Liquid  Diet Effective Now        Comments: Patient May Begin Clear Liquid Diet, Two Hours Following Surgery    03/02/22 1332    03/02/22 1333  Code Status and Medical Interventions:  Continuous         03/02/22 1332    03/02/22 1333  Notify Provider if Bladder Distention Continues  Until Discontinued         03/02/22 1332    03/02/22 1332  ondansetron (ZOFRAN) tablet 4 mg  Every 6 Hours PRN        \"Or\" Linked Group Details    03/02/22 1332    03/02/22 1332  ondansetron (ZOFRAN) injection 4 mg  Every 6 Hours PRN        \"Or\" Linked Group Details    03/02/22 1332    03/02/22 1332  HYDROmorphone (DILAUDID) injection 0.5 mg  Every 2 Hours PRN        \"And\" Linked Group Details    03/02/22 1332    03/02/22 1332  naloxone (NARCAN) injection 0.4 mg  Every 5 Minutes PRN        \"And\" Linked Group Details    03/02/22 1332    03/02/22 1332  oxyCODONE-acetaminophen (PERCOCET) 5-325 MG per tablet 1 tablet  Every 4 Hours PRN         03/02/22 1332    03/02/22 1332  naproxen (NAPROSYN) tablet 250 mg  2 Times Daily PRN         03/02/22 1332    03/02/22 1142  allopurinol (ZYLOPRIM) tablet 300 mg  Daily         03/02/22 1140    03/02/22 1142  atorvastatin (LIPITOR) tablet 80 mg  Daily         03/02/22 1140    03/02/22 1142  levothyroxine (SYNTHROID, LEVOTHROID) tablet 50 mcg  Daily         03/02/22 1140    03/02/22 1142  metoprolol tartrate (LOPRESSOR) tablet 25 mg  Every Morning         03/02/22 1140    03/02/22 1126  Pulse Oximetry, Continuous  Continuous         03/02/22 1125    Unscheduled  Vital signs  As Needed       03/02/22 1416    Unscheduled  Bladder Scan if Patient Unable to Void 4-6 Hours After Catheter Removal  As Needed         03/02/22 1332    Unscheduled  Straight Cath Every 4-6 Hours As Needed If Patient is Unable to Void After 4-6 Hours, Bladder Scan Volume is Greater Than 500mL & Patient Has Symptoms of Bladder " Discomfort / Distention  As Needed       03/02/22 1332    Unscheduled  Consult Pharmacist For Review of Medications That May Cause Urinary Retention - RN To Place Order for Consult it Needed  As Needed       03/02/22 1332    Unscheduled  Schedule / Prompt Voiding For Patients With Urinary Incontinence  As Needed       03/02/22 1332                   Operative/Procedure Notes       Guille Cedeno MD at 03/02/22 0811  Version 1 of 1         COLON RESECTION LAPAROSCOPIC SIGMOID WITH DAVINCI ROBOT  Progress Note    Navarro Bruno  3/2/2022    Pre-op Diagnosis:   Small bowel stricture (HCC) [K56.699]       Post-Op Diagnosis Codes:     * Small bowel stricture (HCC) [K56.699]    Procedure/CPT® Codes:        Procedure(s):  Robotic assisted laparoscopic to Open ileocolic resection with anastomosis    Surgeon(s):  Guille Cedeno MD    Anesthesia: General with Block    Staff:   Circulator: Lachelle Quijano RN; Cindy Michel RN; Grant Steiner RN  Scrub Person: Celsa Belcher; Chun Dsouza; Ciara Gutierres PCT  Assistant: Aaron Patton CSA  Assistant: Aaron Patton CSA      Estimated Blood Loss: 50 cc    Urine Voided: * No values recorded between 3/2/2022  7:30 AM and 3/2/2022 11:22 AM *    Specimens:                Specimens     ID Source Type Tests Collected By Collected At Frozen?    A Small Intestine Tissue · TISSUE PATHOLOGY EXAM   Guille Cedeno MD 3/2/22 1014 No    Description: ileocolic resection    This specimen was not marked as sent.    B Umbilicus Tissue · TISSUE PATHOLOGY EXAM   Guille Cedeno MD 3/2/22 1102     Description: umbilicus    This specimen was not marked as sent.                Drains:   Urethral Catheter Silicone 16 Fr. (Active)       Findings: Dense adhesions from prior ileocolic resection    Complications: None    Assistant: Aaron Patton CSA  was responsible for performing the following activities: Retraction,  Suction, Irrigation, Suturing, Closing, Placing Dressing, Harvesting of Vessels and Held/Positioned Camera and their skilled assistance was necessary for the success of this case.    Guille Cedeno MD     Date: 3/2/2022  Time: 11:22 EST        Electronically signed by Guille Cedeno MD at 03/02/22 1125     Guille Cedeno MD at 03/02/22 0811  Version 1 of 1       Date of procedure: 3/2/2022    Primary Surgeon: Dr. Cedeno    Assistant: Aaron Patton CSA  was responsible for performing the following activities: Retraction, Suction, Irrigation, Suturing, Closing, Placing Dressing, Harvesting of Vessels and Held/Positioned Camera and their skilled assistance was necessary for the success of this case.    Preoperative diagnosis: Ileal stricture    Postoperative diagnosis: Same    Procedure performed:   -Robotic converted to open ileocolonic resection  -Lysis of adhesions of approximately 2 hours    Anesthesia: General plus block    EBL: 50 cc    Complications: None    Findings: Adhesions at the right lower quadrant that prevented easy mobilization robotically.  Case converted to open    Condition of patient: Stable to recovery    Specimen: Ileum and ileocolonic anastomosis    Indications: 66-year-old male with history of prior ileocolic resection for ileal stricture secondary to NSAIDs presented to my office with change of bowel habits.  He was found to have stricture at the ileum prior to the ileocolonic anastomosis.  He was symptomatic.  Discussed with patient about treatment options.  I recommend that he undergoes robotic assisted laparoscopic ileocolonic resection possible open.  Risk and benefits of procedure including bleeding, infection, intra-abdominal injuries, anastomotic leaks were discussed in detail with the patient that verbalized understanding and agree with the plan    Description:   Patient was taken to operative room where he was placed in the OR table in supine  position.  Preoperative antibiotics were given and SCDs were placed.  Patient underwent general endotracheal anesthesia.  Patient abdomen was then prepped and draped in usual sterile fashion.  Timeout was performed the patient was correctly identified.  I accessed the abdominal cavity in the left upper quadrant in the subcostal area with Optiview technique and insertion of a 5 mm trocar.  Upon exploration of the abdominal cavity there was no injury from trocar placement.  There were omental adhesions to the anterior abdominal wall.  I placed another 2 8 mm robotic trochars in the left lower and left and umbilical area, these was done under direct laparoscopic vision.  A 12 mm trocar was placed in the left upper abdomen.  The initial 5 mm trocar was switched by a millimeter trocar, a assistant port of 8 mm was placed in the left flank.  This was done under the laparoscopic vision.  Robotic arms were introduced to operative field and connected to the trochars, instruments were placed under direct vision.  Started procedure by taking adhesions from the omentum to the anterior abdominal wall.  I started trying to take foundations from the small bowel to the prior anastomosis.  There were dense lesions in the right lower quadrant that prevented safe robotic dissection so decided to convert to open procedure.  Robotic instruments were removed and robotic arms were removed from the  operative field.  Trochars were removed.  I perform a midline incision around the umbilical area the incisional cardiothoracic with anesthesia and abdominal wall fascia.  Abdominal cavity was entered, adhesions from the omentum to anterior abdominal wall were taken and completed.  A large wound protector was placed.  Started taking adhesions from the small bowel to the right colon.  Then I proceeded to recognize the area of the ileocolonic anastomosis and this was mobilized slowly from the retroperitoneum.  The ureter was recognized and  preserved.  Mobilization of the right colon was then performed lateral to medial up to the body flexure that was taken down through the omental attachments to the transverse colon where taken down with Bovie electrocautery and Enseal device.  Mobilization of the right colon was performed at the rectum medially and the duodenum was recognized and preserved.  I then proceeded to take a lesions from the small bowel to the small bowel.  This was done with a combination of Bovie electrocautery and Metzenbaum scissors.  I was able to recognize the abnormal ileum that was approximately 20 cm from the prior ileocolonic anastomosis.  With [place] blue load stapler the ileum was transected were healthy approximately 30 cm away from the anastomosis.  Here the small bowel looked normal in caliber and thickness.  An area of resection in the ascending colon was selected and transected with [place] blue load stapler.  I then proceeded to transect the mesentery of the ileum and the colon with Enseal device.  Vascular supply was tied between clamps.  Specimen was removed and sent for pathology, analysis.  Both ends were tested for vascular supply that was excellent.  I then proceeded to perform a side-to-side ileocolonic stapled anastomosis, common enterotomy channel was closed with running interlocking 3-0 chromic and interrupted 2-0 silk Lembert sutures.  Crotch anastomosis working forwards with 2-0 silk suture.  The mesenteric defect was closed with figure of eight 2-0 silk suture.  Hemostasis was achieved irrigation of abdominal cavity was performed.  Seprafilm was placed intra-abdominally.  Because the umbilicus had small amount of purulent fluid I decided to resect the umbilicus to prevent wound infection that would jeopardize patient healing.  This was done with Bovie electrocautery.  I then closed abdominal wall with running 0 PDS suture from the superior inferior portion of the wound.  Skin was closed with staples.  All  laparoscopic skin incisions were closed with staples.  Dressings were placed.  Patient was extubated and taken to recovery area in stable condition.  Instrument sponge count were correct.  Patient tolerated procedure well    Guille Cedeno MD, FACS  General, Minimally Invasive and Endoscopic Surgery  [facility]    [address], [address]  [place], [place], [address]  P: [phone]  F: [phone]       Electronically signed by Guille Cedeno MD at 03/02/22 1137           Rhoda Cornell CSW      Case Management   Case Management/Social Work   Signed   Date of Service:  03/03/22 0946   Creation Time:  03/03/22 0946              Signed            Discharge Planning Assessment  [facility]     Patient Name: Navarro Bruno                 MRN: [id number]  Today's Date: 3/3/2022                       Admit Date: 3/2/2022       Discharge Needs Assessment    No documentation.                           Discharge Plan            Row Name 03/03/22 0944             Plan      Plan Home      Plan Comments CCP met with patient at bedside. CCP role explained and discharge planning discussed. Face sheet verified. Patient’s PCP is Dr. Chavira. Patient lives with a roommate. Patient is independent with his ADLs and does not use DME. Patient has been to [facility] for SNF and denies any HH. Patient does not anticipate any discharge needs and plans to return home. CCP will follow for 02 needs and assist as needed. Sherry Cornell Fco Govea, RN   Registered Nurse   Oncology   Nursing Note   Signed   Date of Service:  03/03/22 0636   Creation Time:  03/03/22 0636              Signed            The patient had abdominal surgery and is POD #1 this morning.Lap site times 5 dry and intact with midline dressing dry as well.  Scheduled Toradol and Tylenol is all that was given for pain last night. Good cough and deep breathing upon request. Vital signs  stable. F/C with good clear yellow output. Will continue to monitor.

## 2022-03-03 NOTE — PLAN OF CARE
Goal Outcome Evaluation:           Progress: improving  Outcome Summary: Pt is A/O x4, up with assist of 1.  Pt is on 2 L O2 via nasal cannula.  Advance to regular diet.  Pt has keller in place with standard drainage bag. 5 lap sites and midline incision with border dressing.  Ambulated with assistance in leone.  VSS, will continue to monitor.

## 2022-03-03 NOTE — PROGRESS NOTES
Postoperative day 1 status post open ileum and ileocolonic anastomosis resection    S: No events overnight.  No bowel function yet, tolerating clear liquid diet.  Minimal abdominal pain, no nausea or vomiting    O:   Vitals:    03/02/22 2356 03/03/22 0402 03/03/22 0750 03/03/22 1147   BP: 132/80 133/78 132/73 134/74   BP Location: Right arm Right arm Right arm Right arm   Patient Position: Lying Lying Lying Lying   Pulse: 81 86 70 51   Resp: 18 18 18 18   Temp: 97 °F (36.1 °C) 97.9 °F (36.6 °C) 97.6 °F (36.4 °C) 98.3 °F (36.8 °C)   TempSrc: Oral Oral Oral Oral   SpO2: 95% 93% 92% 93%   Weight:       Height:         Alert, no acute distress  Breathing comfortable  Regular rate rhythm  Abdomen soft, probably tender, nondistended, incisions clean dry and intact with dressings    White blood cell count 14.2, hemoglobin 11.9, platelets 254, otherwise normal CBC  Blood glucose 180, 160, 152, all other labs stable    Assessment and plan    Postoperative day 1 status post open ilium and ileocolic anastomosis resection with anastomosis.  Recovering as expected, tolerating clear liquid diet.  Abdominal pain is well controlled    -Advance diet as tolerated  -Continue blood glucose control  -Continue IV fluids today  -DC Briceno catheter  -SCDs and Lovenox  -Labs tomorrow    Guille Cedeno MD, FACS  General, Minimally Invasive and Endoscopic Surgery  [facility]    [address], [address]  [place], [place], [address]  P: [phone]  F: [phone]

## 2022-03-03 NOTE — CASE MANAGEMENT/SOCIAL WORK
Discharge Planning Assessment  [facility]     Patient Name: Navarro Bruno  MRN: [id number]  Today's Date: 3/3/2022    Admit Date: 3/2/2022     Discharge Needs Assessment    No documentation.                Discharge Plan     Row Name 03/03/22 0944       Plan    Plan Home    Plan Comments CCP met with patient at bedside. CCP role explained and discharge planning discussed. Face sheet verified. Patient’s PCP is Dr. Chavira. Patient lives with a roommate. Patient is independent with his ADLs and does not use DME. Patient has been to [facility] for SNF and denies any HH. Patient does not anticipate any discharge needs and plans to return home. CCP will follow for 02 needs and assist as needed. Sherry AARON              Continued Care and Services - Admitted Since 3/2/2022    Coordination has not been started for this encounter.          Demographic Summary     Row Name 03/03/22 0937       General Information    Admission Type inpatient    Arrived From emergency department    Referral Source admission list    Reason for Consult discharge planning    Preferred Language English     Used During This Interaction no               Functional Status     Row Name 03/03/22 0938       Functional Status    Usual Activity Tolerance good    Current Activity Tolerance good       Functional Status, IADL    Medications independent    Meal Preparation independent    Housekeeping independent    Laundry independent    Shopping independent       Mental Status    General Appearance WDL WDL       Mental Status Summary    Recent Changes in Mental Status/Cognitive Functioning no changes       Employment/    Employment Status employed full-time               Psychosocial    No documentation.                Abuse/Neglect    No documentation.                Legal    No documentation.                Substance Abuse    No documentation.                Patient Forms    No documentation.                   Rhoda Cornell,  CSW

## 2022-03-04 DIAGNOSIS — E03.9 HYPOTHYROIDISM, UNSPECIFIED TYPE: Chronic | ICD-10-CM

## 2022-03-04 LAB
BASOPHILS # BLD AUTO: 0.02 10*3/MM3 (ref 0–0.2)
BASOPHILS NFR BLD AUTO: 0.2 % (ref 0–1.5)
DEPRECATED RDW RBC AUTO: 43.9 FL (ref 37–54)
EOSINOPHIL # BLD AUTO: 0.02 10*3/MM3 (ref 0–0.4)
EOSINOPHIL NFR BLD AUTO: 0.2 % (ref 0.3–6.2)
ERYTHROCYTE [DISTWIDTH] IN BLOOD BY AUTOMATED COUNT: 14.4 % (ref 12.3–15.4)
GLUCOSE BLDC GLUCOMTR-MCNC: 166 MG/DL (ref 70–130)
GLUCOSE BLDC GLUCOMTR-MCNC: 210 MG/DL (ref 70–130)
HCT VFR BLD AUTO: 32.5 % (ref 37.5–51)
HGB BLD-MCNC: 10.9 G/DL (ref 13–17.7)
IMM GRANULOCYTES # BLD AUTO: 0.08 10*3/MM3 (ref 0–0.05)
IMM GRANULOCYTES NFR BLD AUTO: 0.7 % (ref 0–0.5)
LYMPHOCYTES # BLD AUTO: 0.86 10*3/MM3 (ref 0.7–3.1)
LYMPHOCYTES NFR BLD AUTO: 7.9 % (ref 19.6–45.3)
MCH RBC QN AUTO: 28.4 PG (ref 26.6–33)
MCHC RBC AUTO-ENTMCNC: 33.5 G/DL (ref 31.5–35.7)
MCV RBC AUTO: 84.6 FL (ref 79–97)
MONOCYTES # BLD AUTO: 1.03 10*3/MM3 (ref 0.1–0.9)
MONOCYTES NFR BLD AUTO: 9.5 % (ref 5–12)
NEUTROPHILS NFR BLD AUTO: 8.88 10*3/MM3 (ref 1.7–7)
NEUTROPHILS NFR BLD AUTO: 81.5 % (ref 42.7–76)
NRBC BLD AUTO-RTO: 0 /100 WBC (ref 0–0.2)
PLATELET # BLD AUTO: 205 10*3/MM3 (ref 140–450)
PMV BLD AUTO: 10.3 FL (ref 6–12)
RBC # BLD AUTO: 3.84 10*6/MM3 (ref 4.14–5.8)
WBC NRBC COR # BLD: 10.89 10*3/MM3 (ref 3.4–10.8)

## 2022-03-04 PROCEDURE — 85025 COMPLETE CBC W/AUTO DIFF WBC: CPT | Performed by: SURGERY

## 2022-03-04 PROCEDURE — 25010000002 HYDROMORPHONE PER 4 MG: Performed by: SURGERY

## 2022-03-04 PROCEDURE — 25010000002 HEPARIN (PORCINE) PER 1000 UNITS: Performed by: SURGERY

## 2022-03-04 PROCEDURE — 94799 UNLISTED PULMONARY SVC/PX: CPT

## 2022-03-04 PROCEDURE — 25010000002 KETOROLAC TROMETHAMINE PER 15 MG: Performed by: SURGERY

## 2022-03-04 PROCEDURE — 82962 GLUCOSE BLOOD TEST: CPT

## 2022-03-04 PROCEDURE — 99024 POSTOP FOLLOW-UP VISIT: CPT | Performed by: SURGERY

## 2022-03-04 PROCEDURE — 63710000001 INSULIN LISPRO (HUMAN) PER 5 UNITS: Performed by: SURGERY

## 2022-03-04 RX ORDER — PREGABALIN 50 MG/1
50 CAPSULE ORAL DAILY
Qty: 15 CAPSULE | Refills: 0 | Status: CANCELLED | OUTPATIENT
Start: 2022-03-05

## 2022-03-04 RX ORDER — PREGABALIN 50 MG/1
50 CAPSULE ORAL DAILY
Status: DISCONTINUED | OUTPATIENT
Start: 2022-03-04 | End: 2022-03-06 | Stop reason: HOSPADM

## 2022-03-04 RX ORDER — KETOROLAC TROMETHAMINE 15 MG/ML
15 INJECTION, SOLUTION INTRAMUSCULAR; INTRAVENOUS EVERY 6 HOURS
Status: DISCONTINUED | OUTPATIENT
Start: 2022-03-04 | End: 2022-03-04

## 2022-03-04 RX ORDER — LEVOTHYROXINE SODIUM 0.05 MG/1
TABLET ORAL
Qty: 90 TABLET | Refills: 0 | Status: SHIPPED | OUTPATIENT
Start: 2022-03-04 | End: 2022-06-17

## 2022-03-04 RX ADMIN — HYDROMORPHONE HYDROCHLORIDE 0.5 MG: 1 INJECTION, SOLUTION INTRAMUSCULAR; INTRAVENOUS; SUBCUTANEOUS at 21:29

## 2022-03-04 RX ADMIN — PREGABALIN 50 MG: 50 CAPSULE ORAL at 16:01

## 2022-03-04 RX ADMIN — HEPARIN SODIUM 5000 UNITS: 5000 INJECTION INTRAVENOUS; SUBCUTANEOUS at 06:41

## 2022-03-04 RX ADMIN — HEPARIN SODIUM 5000 UNITS: 5000 INJECTION INTRAVENOUS; SUBCUTANEOUS at 21:30

## 2022-03-04 RX ADMIN — ACETAMINOPHEN 1000 MG: 500 TABLET ORAL at 06:44

## 2022-03-04 RX ADMIN — INSULIN LISPRO 2 UNITS: 100 INJECTION, SOLUTION INTRAVENOUS; SUBCUTANEOUS at 12:09

## 2022-03-04 RX ADMIN — ALLOPURINOL 300 MG: 300 TABLET ORAL at 08:53

## 2022-03-04 RX ADMIN — LEVOTHYROXINE SODIUM 50 MCG: 0.05 TABLET ORAL at 08:55

## 2022-03-04 RX ADMIN — FAMOTIDINE 20 MG: 20 TABLET ORAL at 21:29

## 2022-03-04 RX ADMIN — HEPARIN SODIUM 5000 UNITS: 5000 INJECTION INTRAVENOUS; SUBCUTANEOUS at 15:28

## 2022-03-04 RX ADMIN — INSULIN LISPRO 4 UNITS: 100 INJECTION, SOLUTION INTRAVENOUS; SUBCUTANEOUS at 08:52

## 2022-03-04 RX ADMIN — FAMOTIDINE 20 MG: 20 TABLET ORAL at 08:53

## 2022-03-04 RX ADMIN — INSULIN LISPRO 2 UNITS: 100 INJECTION, SOLUTION INTRAVENOUS; SUBCUTANEOUS at 19:54

## 2022-03-04 RX ADMIN — METOPROLOL TARTRATE 25 MG: 25 TABLET, FILM COATED ORAL at 06:41

## 2022-03-04 RX ADMIN — ATORVASTATIN CALCIUM 80 MG: 80 TABLET, FILM COATED ORAL at 08:53

## 2022-03-04 RX ADMIN — SODIUM CHLORIDE 100 ML/HR: 9 INJECTION, SOLUTION INTRAVENOUS at 07:27

## 2022-03-04 RX ADMIN — KETOROLAC TROMETHAMINE 15 MG: 15 INJECTION, SOLUTION INTRAMUSCULAR; INTRAVENOUS at 08:54

## 2022-03-04 NOTE — PROGRESS NOTES
Postoperative day 2 status post open ileum and ileocolonic anastomosis resection    S: No events overnight.  Passing gas, no bowel movement yet, tolerating regular diet.  Minimal abdominal pain, no nausea or vomiting    O:  Vitals:    03/03/22 1500 03/03/22 2110 03/04/22 0513 03/04/22 0811   BP: 123/68 125/68 129/72 122/66   BP Location: Right arm Right arm Right arm Right arm   Patient Position: Lying Lying Lying Lying   Pulse: 55 64 65 67   Resp: 18 18 18 18   Temp: 97.8 °F (36.6 °C) 98.1 °F (36.7 °C) 97.5 °F (36.4 °C) 97.1 °F (36.2 °C)   TempSrc: Oral Oral Oral Oral   SpO2: 99% 91% 92% 91%   Weight:       Height:         Alert, no acute distress  Breathing comfortable  Regular rate and rhythm  Abdomen soft, nontender, nondistended, incisions clean dry and intact with staples    White blood cell count 10.8, hemoglobin 10.9, stable    Pathology report:   Final Diagnosis   1. Ileum and Colon, Ileocolic Resection:               A. Acute ileitis with ulcer, stricture formation and associated serositis proximal to anastomosis.               B. Intact anastomosis with focal suture granuloma.               C. Ulceration focally extends to the proximal ileal margin of resection.                D. Benign viable distal colonic margin of resection.               E. Thirteen benign lymph nodes (0/13).     2. Umbilicus, Excision:               A. Benign epidermis and dermis with chronic active inflammation and reactive changes.               B. No evidence of dysplasia nor neoplasm identified.     Assessment and plan    Postop day 2 status post open ileum and ileocolic resection, recovering as expected, no much bowel function yet but tolerating diet.   Discussed with him about the need to continue regular diet, will DC IV fluids, ambulate,  Discharge home when more consistent bowel function, hopefully tomorrow  Discussed with him about pathology report and the fact that he has some focal ulceration at the proximal ileal margin  and the risk of recurrent abscesses that hopefully will not happen.

## 2022-03-04 NOTE — DISCHARGE INSTRUCTIONS
COLON, GASTRIC OR SMALL BOWEL RESECTION  POST OP RECOMMENDATIONS  Dr. Guille Cedeno  [phone]  ACTIVITIES:  1. Expect to rest most of the first week after discharge from hospital, but do get up several times daily to reduce the risk of getting a clot in your legs.  2. No strenuous activity or lifting over 10 lbs. for two weeks.  Add 5 lbs. a week to that restriction until 6 weeks out from surgery.  Try to avoid squatting and deep bends for about a week.  3. No driving until seen at your post operative appointment.  You must be off pain meds 24 hours before driving after that visit unless otherwise instructed.  4. You can climb stairs, but minimize this and initially do one step at a time (both feet on one step rather than going up with each step.)  SYMPTOMS:  1. Avoiding constipation is important after this surgery as it is common when taking pain medication.  Over the counter laxatives, such as Miralax, can be used temporarily to avoid this.   2. Fatigue and decreased stamina is not unusual for about a week or so after surgery due to anesthesia.  Try to take walks and some mild activity between resting.  3. Shoulder pain is not unusual from the “gas” used in laparoscopy which will dissipate within 1-3 days.  If it does not, call your physician.  4. It is not unusual for your gastrointestinal system to take 1-2 months to get back to your normal routine and you may have long term changes towards looser bowel movements  WOUND SITE:  1. Dressings can be removed 7-10 days after procedure if desired.  The “tega-derm” may be removed in 3 days if instructed to.  2. Dressings may occasionally have spots of blood on them.  As long as it is dry, these do not need to be changed.  If it is soaked, then the dressing should be removed and a new dressing placed.  3. Skin irritation, redness or itching can prompt removal of the bandage earlier if present.  4. Steri-strips are to be left in place until they fall off on their own in  1-2 weeks.  If they are irritating then they may be removed sooner.  5. No showering if a drain is in place. Once removed, and the skin is covered, you may shower.    6. Showering may occur while the “tega-derm” is in place.  If it is off, then you must wait 3 days after surgery before showering.  MEALS:  1. A soft diet is recommended for the first 1-2 days after discharge from the hospital.  A normal diet may then be started as tolerated after that. Follow dietary guidelines when specified  2. Expect to eat less after this procedure and fill up somewhat faster.   3. Do NOT take pain pills on an empty stomach.  WORK:  1. In general if you have a sedentary job, you can return to work in 3 weeks if done laparoscopically.  If lifting or exertion is required it may be 3-6 weeks depending on your recovery.    2. Use discretion and remember that your stamina will be decreased post operatively.  3. Return to work notes can be provided at the time of your post-operative appointment.  FOLLOW UP:  1. Call and make a post-operative appointment for approximately 10-14 days after the procedure.

## 2022-03-05 LAB
GLUCOSE BLDC GLUCOMTR-MCNC: 147 MG/DL (ref 70–130)
GLUCOSE BLDC GLUCOMTR-MCNC: 152 MG/DL (ref 70–130)
GLUCOSE BLDC GLUCOMTR-MCNC: 153 MG/DL (ref 70–130)
GLUCOSE BLDC GLUCOMTR-MCNC: 155 MG/DL (ref 70–130)
GLUCOSE BLDC GLUCOMTR-MCNC: 159 MG/DL (ref 70–130)
GLUCOSE BLDC GLUCOMTR-MCNC: 189 MG/DL (ref 70–130)

## 2022-03-05 PROCEDURE — 63710000001 INSULIN LISPRO (HUMAN) PER 5 UNITS: Performed by: SURGERY

## 2022-03-05 PROCEDURE — 99024 POSTOP FOLLOW-UP VISIT: CPT | Performed by: SURGERY

## 2022-03-05 PROCEDURE — 25010000002 HEPARIN (PORCINE) PER 1000 UNITS: Performed by: SURGERY

## 2022-03-05 PROCEDURE — 82962 GLUCOSE BLOOD TEST: CPT

## 2022-03-05 RX ORDER — DOCUSATE SODIUM 100 MG/1
100 CAPSULE, LIQUID FILLED ORAL 2 TIMES DAILY
Status: DISCONTINUED | OUTPATIENT
Start: 2022-03-05 | End: 2022-03-06 | Stop reason: HOSPADM

## 2022-03-05 RX ORDER — BISACODYL 10 MG
10 SUPPOSITORY, RECTAL RECTAL DAILY
Status: DISCONTINUED | OUTPATIENT
Start: 2022-03-05 | End: 2022-03-06 | Stop reason: HOSPADM

## 2022-03-05 RX ADMIN — HEPARIN SODIUM 5000 UNITS: 5000 INJECTION INTRAVENOUS; SUBCUTANEOUS at 06:30

## 2022-03-05 RX ADMIN — PREGABALIN 50 MG: 50 CAPSULE ORAL at 09:03

## 2022-03-05 RX ADMIN — ALLOPURINOL 300 MG: 300 TABLET ORAL at 08:20

## 2022-03-05 RX ADMIN — FAMOTIDINE 20 MG: 20 TABLET ORAL at 08:21

## 2022-03-05 RX ADMIN — HEPARIN SODIUM 5000 UNITS: 5000 INJECTION INTRAVENOUS; SUBCUTANEOUS at 14:50

## 2022-03-05 RX ADMIN — OXYCODONE AND ACETAMINOPHEN 1 TABLET: 5; 325 TABLET ORAL at 05:17

## 2022-03-05 RX ADMIN — OXYCODONE AND ACETAMINOPHEN 1 TABLET: 5; 325 TABLET ORAL at 21:52

## 2022-03-05 RX ADMIN — OXYCODONE AND ACETAMINOPHEN 1 TABLET: 5; 325 TABLET ORAL at 09:12

## 2022-03-05 RX ADMIN — HEPARIN SODIUM 5000 UNITS: 5000 INJECTION INTRAVENOUS; SUBCUTANEOUS at 21:51

## 2022-03-05 RX ADMIN — FAMOTIDINE 20 MG: 20 TABLET ORAL at 21:51

## 2022-03-05 RX ADMIN — INSULIN LISPRO 2 UNITS: 100 INJECTION, SOLUTION INTRAVENOUS; SUBCUTANEOUS at 21:51

## 2022-03-05 RX ADMIN — DOCUSATE SODIUM 100 MG: 100 CAPSULE, LIQUID FILLED ORAL at 11:15

## 2022-03-05 RX ADMIN — ATORVASTATIN CALCIUM 80 MG: 80 TABLET, FILM COATED ORAL at 08:21

## 2022-03-05 RX ADMIN — DOCUSATE SODIUM 100 MG: 100 CAPSULE, LIQUID FILLED ORAL at 21:51

## 2022-03-05 RX ADMIN — OXYCODONE AND ACETAMINOPHEN 1 TABLET: 5; 325 TABLET ORAL at 14:50

## 2022-03-05 RX ADMIN — BISACODYL 10 MG: 10 SUPPOSITORY RECTAL at 21:51

## 2022-03-05 RX ADMIN — MAGNESIUM HYDROXIDE 10 ML: 2400 SUSPENSION ORAL at 11:15

## 2022-03-05 RX ADMIN — LEVOTHYROXINE SODIUM 50 MCG: 0.05 TABLET ORAL at 08:21

## 2022-03-05 NOTE — PLAN OF CARE
Goal Outcome Evaluation:           Progress: improving  Outcome Evaluation: Patient alert and oriented, Percocet given for pain as needed, IV saline locked, ambulating the halls with staff, voiding per urinal, oral bowel stimulants given, still no BM or passing gas, lap site and midline incision clean dry and intact, blood sugars checked prior to meals, vital signs stable no acute needs at this time planning for discharge tomorrow 3/6

## 2022-03-05 NOTE — PROGRESS NOTES
"General Surgery  Progress Note    CC: Follow-up ileal stricture    POD#3 ileocolic resection    S: He is tolerating a regular diet with pain well controlled.  He is not passing any flatus or bowel movement yet, but denies any nausea or vomiting.    O:/72 (BP Location: Left arm, Patient Position: Lying)   Pulse 62   Temp 98.6 °F (37 °C) (Oral)   Resp 16   Ht 190.5 cm (75\")   Wt 107 kg (236 lb 12.8 oz)   SpO2 93%   BMI 29.60 kg/m²     Intake & Output:  UOP: 1400 mL/24 hrs    GENERAL: alert, well appearing, and in no distress  HEENT: normocephalic, atraumatic, moist mucous membranes, clear sclerae   CHEST: clear to auscultation, no wheezes, rales or rhonchi, symmetric air entry  CARDIAC: regular rate and rhythm    ABDOMEN: Soft, nontender, nondistended, incisions clean/dry/intact with staples, no incisional erythema  EXTREMITIES: no cyanosis, clubbing, or edema   SKIN: Warm and moist, no rashes    LABS  Results from last 7 days   Lab Units 03/04/22  0742 03/03/22  0725 02/28/22  1631   WBC 10*3/mm3 10.89* 14.21* 10.43   HEMOGLOBIN g/dL 10.9* 11.9* 12.9*   HEMATOCRIT % 32.5* 35.8* 40.6   PLATELETS 10*3/mm3 205 254 276     Results from last 7 days   Lab Units 03/03/22  0725 02/28/22  1631   SODIUM mmol/L 138 138   POTASSIUM mmol/L 4.6 4.1   CHLORIDE mmol/L 102 101   CO2 mmol/L 20.4* 25.0   BUN mg/dL 18 16   CREATININE mg/dL 1.02 0.87   CALCIUM mg/dL 8.4* 9.4   GLUCOSE mg/dL 152* 118*             A/P: 66 y.o. male POD#3 open ileocolic resection    Continue to await bowel function.  I have ordered a bowel regimen today including Colace, milk of magnesia, and Dulcolax suppository to try to stimulate return of bowel function.  If he is able to have a bowel movement overnight, he will likely be stable for discharge home tomorrow.    Kristin March MD  General, Robotic, and Endoscopic Surgery  [facility]    [address], [address]  [place] [address]  P: [phone]  F: [phone]     "

## 2022-03-05 NOTE — PLAN OF CARE
Goal Outcome Evaluation:           Progress: improving  Outcome Summary: Pt is A/O x 4, up with assist of 1, on room air.  Urinal is in reach, Pt is able to void, getting pt up to toilet. Accuchecks ACHS. He has 5 lap sites and one midline incision.  No bm or passing of gas today.  VSS, will continue to monitor.

## 2022-03-05 NOTE — PLAN OF CARE
Goal Outcome Evaluation:       AOx4. C/o pain in abdomen. Dilaudid and percocet given once. Ambulated in halls tonight and sat in chair for a bit. Pt is belching to pass gas, but no bm yet. Using urinal at bedside. Encouraged to use IS w/ redirection given on how to use. ACHS. VSS. Will continue to monitor

## 2022-03-06 ENCOUNTER — READMISSION MANAGEMENT (OUTPATIENT)
Dept: CALL CENTER | Facility: HOSPITAL | Age: 67
End: 2022-03-06

## 2022-03-06 VITALS
RESPIRATION RATE: 16 BRPM | HEART RATE: 64 BPM | OXYGEN SATURATION: 92 % | SYSTOLIC BLOOD PRESSURE: 124 MMHG | HEIGHT: 75 IN | WEIGHT: 236.8 LBS | DIASTOLIC BLOOD PRESSURE: 77 MMHG | BODY MASS INDEX: 29.44 KG/M2 | TEMPERATURE: 97.1 F

## 2022-03-06 LAB
GLUCOSE BLDC GLUCOMTR-MCNC: 136 MG/DL (ref 70–130)
GLUCOSE BLDC GLUCOMTR-MCNC: 156 MG/DL (ref 70–130)

## 2022-03-06 PROCEDURE — 82962 GLUCOSE BLOOD TEST: CPT

## 2022-03-06 PROCEDURE — 25010000002 HEPARIN (PORCINE) PER 1000 UNITS: Performed by: SURGERY

## 2022-03-06 PROCEDURE — 99024 POSTOP FOLLOW-UP VISIT: CPT | Performed by: SURGERY

## 2022-03-06 RX ORDER — OXYCODONE HYDROCHLORIDE AND ACETAMINOPHEN 5; 325 MG/1; MG/1
1 TABLET ORAL EVERY 4 HOURS PRN
Qty: 15 TABLET | Refills: 0 | Status: SHIPPED | OUTPATIENT
Start: 2022-03-06 | End: 2022-03-15

## 2022-03-06 RX ORDER — ONDANSETRON 4 MG/1
4 TABLET, FILM COATED ORAL EVERY 6 HOURS PRN
Qty: 30 TABLET | Refills: 0 | Status: SHIPPED | OUTPATIENT
Start: 2022-03-06 | End: 2022-03-15

## 2022-03-06 RX ORDER — AMOXICILLIN 250 MG
2 CAPSULE ORAL DAILY PRN
Qty: 30 TABLET | Refills: 1 | Status: SHIPPED | OUTPATIENT
Start: 2022-03-06 | End: 2022-04-15

## 2022-03-06 RX ADMIN — HEPARIN SODIUM 5000 UNITS: 5000 INJECTION INTRAVENOUS; SUBCUTANEOUS at 06:58

## 2022-03-06 RX ADMIN — ALLOPURINOL 300 MG: 300 TABLET ORAL at 09:01

## 2022-03-06 RX ADMIN — PREGABALIN 50 MG: 50 CAPSULE ORAL at 09:00

## 2022-03-06 RX ADMIN — OXYCODONE AND ACETAMINOPHEN 1 TABLET: 5; 325 TABLET ORAL at 09:01

## 2022-03-06 RX ADMIN — FAMOTIDINE 20 MG: 20 TABLET ORAL at 09:01

## 2022-03-06 RX ADMIN — DOCUSATE SODIUM 100 MG: 100 CAPSULE, LIQUID FILLED ORAL at 09:01

## 2022-03-06 RX ADMIN — MAGNESIUM HYDROXIDE 10 ML: 2400 SUSPENSION ORAL at 09:01

## 2022-03-06 RX ADMIN — LEVOTHYROXINE SODIUM 50 MCG: 0.05 TABLET ORAL at 09:02

## 2022-03-06 RX ADMIN — ATORVASTATIN CALCIUM 80 MG: 80 TABLET, FILM COATED ORAL at 09:02

## 2022-03-06 RX ADMIN — OXYCODONE AND ACETAMINOPHEN 1 TABLET: 5; 325 TABLET ORAL at 02:37

## 2022-03-06 NOTE — OUTREACH NOTE
Prep Survey    Flowsheet Row Responses   [facility] patient discharged from? [place]   Is LACE score < 7 ? No   Emergency Room discharge w/ pulse ox? No   Eligibility [facility]   Date of Admission 03/02/22   Date of Discharge 03/06/22   Discharge Disposition Home or Self Care   Discharge diagnosis Robotic converted to open ileocolonic resection   Does the patient have one of the following disease processes/diagnoses(primary or secondary)? General Surgery   Does the patient have Home health ordered? No   Is there a DME ordered? No   Prep survey completed? Yes          MIGUEL NEW - Registered Nurse

## 2022-03-06 NOTE — PROGRESS NOTES
"General Surgery  Progress Note    CC: Follow-up ileal stricture    POD#4 ileocolic resection    S: He had a bowel movement this morning and says he is ready to go home.  He is tolerating a regular diet with pain well controlled using Percocet alone.    O:/77 (BP Location: Right arm, Patient Position: Lying)   Pulse 64   Temp 97.1 °F (36.2 °C) (Oral)   Resp 16   Ht 190.5 cm (75\")   Wt 107 kg (236 lb 12.8 oz)   SpO2 92%   BMI 29.60 kg/m²     GENERAL: alert, well appearing, and in no distress  HEENT: normocephalic, atraumatic, moist mucous membranes, clear sclerae   CHEST: clear to auscultation, no wheezes, rales or rhonchi, symmetric air entry  CARDIAC: regular rate and rhythm    ABDOMEN: Soft, nontender, nondistended, incisions clean/dry/intact with staples, no incisional erythema  EXTREMITIES: no cyanosis, clubbing, or edema   SKIN: Warm and moist, no rashes    LABS  Results from last 7 days   Lab Units 03/04/22  0742 03/03/22  0725 02/28/22  1631   WBC 10*3/mm3 10.89* 14.21* 10.43   HEMOGLOBIN g/dL 10.9* 11.9* 12.9*   HEMATOCRIT % 32.5* 35.8* 40.6   PLATELETS 10*3/mm3 205 254 276     Results from last 7 days   Lab Units 03/03/22  0725 02/28/22  1631   SODIUM mmol/L 138 138   POTASSIUM mmol/L 4.6 4.1   CHLORIDE mmol/L 102 101   CO2 mmol/L 20.4* 25.0   BUN mg/dL 18 16   CREATININE mg/dL 1.02 0.87   CALCIUM mg/dL 8.4* 9.4   GLUCOSE mg/dL 152* 118*             A/P: 66 y.o. male POD#4 open ileocolic resection    Discharge to home today and follow-up in 2 weeks for staple removal.  He requested that his discharge paperwork say nothing about avoidance of heavy lifting postop so that he can return to work.  I explained that from a medical legal standpoint, I have to document in his chart my medical recommendations regarding avoidance of heavy lifting.    Kristin March MD  General, Robotic, and Endoscopic Surgery  [facility]    [address], [address]  [place] [address]  P: " [phone]  F: [phone]

## 2022-03-07 ENCOUNTER — TRANSITIONAL CARE MANAGEMENT TELEPHONE ENCOUNTER (OUTPATIENT)
Dept: CALL CENTER | Facility: HOSPITAL | Age: 67
End: 2022-03-07

## 2022-03-07 NOTE — OUTREACH NOTE
Call Center TCM Note    Flowsheet Row Responses   [facility] patient discharged from? [place]   Does the patient have one of the following disease processes/diagnoses(primary or secondary)? General Surgery   TCM attempt successful? No   Unsuccessful attempts Attempt 2          Ximena Garza RN    3/7/2022, 13:16 EST

## 2022-03-07 NOTE — PAYOR COMM NOTE
"DISCHARGED  REF #[id number]  ID #[id number]        Truman Lacy (66 y.o. Male)             Date of Birth   1955    Social Security Number       Address   [address] [place] [address]    Home Phone   [phone]    MRN   [id number]       Anglic[employer]   Worship    Marital Status                               Admission Date   3/2/22    Admission Type   Elective    Admitting Provider   Guille Cedeno MD    Attending Provider       Department, Room/Bed   [facility] [address] [place], P394/1       Discharge Date   3/6/2022    Discharge Disposition   Home or Self Care    Discharge Destination                               Attending Provider: (none)   Allergies: No Known Allergies    Isolation: None   Infection: COVID (History) (03/01/22)   Code Status: Prior   Advance Care Planning Activity    Ht: 190.5 cm (75\")   Wt: 107 kg (236 lb 12.8 oz)    Admission Cmt: None   Principal Problem: Ileum ulcer [K63.3] More...                 Active Insurance as of 3/2/2022     Primary Coverage     Payor Plan Insurance Group Employer/Plan Group    AETNA COMMERCIAL AETNA [id number]     Payor Plan Address Payor Plan Phone Number Payor Plan Fax Number Effective Dates    [address] [phone]  1/1/2022 - None Entered    [facility] [address]       Subscriber Name Subscriber Birth Date Member ID       TRUMAN LACY 1955 [id number]           Secondary Coverage     Payor Plan Insurance Group Employer/Plan Group    MEDICARE MEDICARE A & B      Payor Plan Address Payor Plan Phone Number Payor Plan Fax Number Effective Dates    [address] [phone]  12/1/2019 - None Entered    [facility] [id number]       Subscriber Name Subscriber Birth Date Member ID       TRUMAN LACY 1955 [id number]                 Emergency Contacts      (Rel.) Home Phone Work Phone Mobile Phone    WorshipBhavin (Brother) [phone] -- [phone]            Discharge Summary  "   No notes of this type exist for this encounter.         Discharge Order (From admission, onward)     Start     Ordered    03/06/22 1224  Discharge patient  Once        Expected Discharge Date: 03/06/22    Discharge Disposition: Home or Self Care    Physician of Record for Attribution - Please select from Treatment Team: RAMON GOYAL [[id number]]    Review needed by CMO to determine Physician of Record: No       Question Answer Comment   Physician of Record for Attribution - Please select from Treatment Team RAMON GOYAL    Review needed by CMO to determine Physician of Record No        03/06/22 1228

## 2022-03-07 NOTE — OUTREACH NOTE
Call Center TCM Note    Flowsheet Row Responses   [facility] patient discharged from? [place]   Does the patient have one of the following disease processes/diagnoses(primary or secondary)? General Surgery   TCM attempt successful? No  [Outdated PCP verbal release]   Unsuccessful attempts Attempt 1          Ximena Garza RN    3/7/2022, 12:23 EST

## 2022-03-08 ENCOUNTER — TRANSITIONAL CARE MANAGEMENT TELEPHONE ENCOUNTER (OUTPATIENT)
Dept: CALL CENTER | Facility: HOSPITAL | Age: 67
End: 2022-03-08

## 2022-03-08 NOTE — OUTREACH NOTE
Call Center TCM Note    Flowsheet Row Responses   [facility] patient discharged from? [place]   Does the patient have one of the following disease processes/diagnoses(primary or secondary)? General Surgery   TCM attempt successful? No   Unsuccessful attempts Attempt 3          Natalie Singh RN    3/8/2022, 16:14 EST

## 2022-03-08 NOTE — CASE MANAGEMENT/SOCIAL WORK
Case Management Discharge Note      Final Note: Home---no needs....PRC         Selected Continued Care - Discharged on 3/6/2022 Admission date: 3/2/2022 - Discharge disposition: Home or Self Care    Destination    No services have been selected for the patient.              Durable Medical Equipment    No services have been selected for the patient.              Dialysis/Infusion    No services have been selected for the patient.              Home Medical Care    No services have been selected for the patient.              Therapy    No services have been selected for the patient.              Community Resources    No services have been selected for the patient.              Community & DME    No services have been selected for the patient.                       Final Discharge Disposition Code: 01 - home or self-care

## 2022-03-09 NOTE — DISCHARGE SUMMARY
Admission date: 3/2/2022   Discharge date: 3/6/2022  2:01 PM     Admission diagnosis: Small bowel stricture (HCC) [K56.699]     Discharge diagnosis: Same     Procedure: Robotic assisted laparoscopic converted to open ileocolic resection    Hospital course: Patient was admitted to the hospital after undergoing ileocolic resection.  His postoperative course was really uneventful.  He started having bowel function on postoperative day 2.  He tolerated clear liquid diet on postoperative day 1.  Diet was slowly advanced.  Pain was well controlled.  He continued to have more bowel function throughout his admission.  Briceno catheter was removed on postoperative day 2 without any problem.  He tolerated that without any problem.  Vital signs were stable, his white blood cell count was improving at discharge.  The patient was discharged home in stable condition     Meds:      Your medication list      START taking these medications      Instructions Last Dose Given Next Dose Due   ondansetron 4 MG tablet  Commonly known as: ZOFRAN      Take 1 tablet by mouth Every 6 (Six) Hours As Needed for Nausea or Vomiting.       oxyCODONE-acetaminophen 5-325 MG per tablet  Commonly known as: PERCOCET      Take 1 tablet by mouth Every 4 (Four) Hours As Needed for Moderate Pain .       sennosides-docusate 8.6-50 MG per tablet  Commonly known as: PERICOLACE      Take 2 tablets by mouth Daily As Needed for Constipation.          CHANGE how you take these medications      Instructions Last Dose Given Next Dose Due   allopurinol 300 MG tablet  Commonly known as: ZYLOPRIM  What changed:   · how much to take  · how to take this  · when to take this      TAKE 1 TABLET BY MOUTH EVERY DAY       atorvastatin 80 MG tablet  Commonly known as: LIPITOR  What changed:   · how much to take  · how to take this  · when to take this      TAKE 1 TABLET BY MOUTH EVERY DAY FOR CHOLESTEROL       levothyroxine 50 MCG tablet  Commonly known as: SYNTHROID,  LEVOTHROID  What changed: additional instructions      TAKE 1 TABLET BY MOUTH EVERY DAY FOR LOW THYROID       metoprolol tartrate 25 MG tablet  Commonly known as: LOPRESSOR  What changed:   · how much to take  · how to take this  · when to take this      TAKE 1 TABLET BY MOUTH TWO TIMES A DAY for high blood pressure          CONTINUE taking these medications      Instructions Last Dose Given Next Dose Due   B-D ULTRAFINE III SHORT PEN 31G X 8 MM misc  Generic drug: Insulin Pen Needle      Use with Victoza injections daily.       CALCIUM CITRATE + D3 MAXIMUM PO      Take 1 tablet by mouth Daily.       Janumet  MG per tablet  Generic drug: sitaGLIPtin-metFORMIN      Take 1 tablet by mouth 2 (Two) Times a Day With Meals.       NAPROXEN PO      Take 2 tablets by mouth 3 (Three) Times a Day. Advised to take at 1400, 1600 and 2200.       PROBIOTIC PO      Take 1 tablet by mouth Daily.       Rybelsus 7 MG tablet  Generic drug: Semaglutide      Take 7 mg by mouth Daily. For diabetes       SUPER C-500 PO      Take 500 mg by mouth Daily.       vitamin D3 125 MCG (5000 UT) capsule capsule      Take 5,000 Units by mouth Daily.          STOP taking these medications    Chlorhexidine Gluconate Cloth 2 % pads              Where to Get Your Medications      These medications were sent to [facility] [place] [id number]    Hours: 7:00 AM-6:00 PM Mon-Fri, 8:00 AM-4:30 PM Sat-Sun (Closed 12-12:30PM) Phone: [phone]   · ondansetron 4 MG tablet  · oxyCODONE-acetaminophen 5-325 MG per tablet  · sennosides-docusate 8.6-50 MG per tablet     These medications were sent to [id number] - [place], [place] - [address] [facility] - [phone]  - [phone] [place] [id number]    Phone: [phone]   · levothyroxine 50 MCG tablet         Instructions:    - No heavy lifting of more than 15 lb for 6 weeks. Can start doing aerobic type exercise in 4 weeks.   -  No driving while taking pain medications  - Take medications as prescribed.   - Can shower, no bath tub    Follow up: Dr. Cedeno in 2 weeks, call for appointment      Guille Cedeno MD  General, Minimally Invasive and Endoscopic Surgery  [facility]

## 2022-03-15 ENCOUNTER — OFFICE VISIT (OUTPATIENT)
Dept: SURGERY | Facility: CLINIC | Age: 67
End: 2022-03-15

## 2022-03-15 DIAGNOSIS — Z51.89 VISIT FOR WOUND CHECK: Primary | ICD-10-CM

## 2022-03-15 DIAGNOSIS — Z09 POSTOP CHECK: ICD-10-CM

## 2022-03-15 PROCEDURE — 99024 POSTOP FOLLOW-UP VISIT: CPT | Performed by: SURGERY

## 2022-03-16 ENCOUNTER — TELEPHONE (OUTPATIENT)
Dept: SURGERY | Facility: CLINIC | Age: 67
End: 2022-03-16

## 2022-03-16 ENCOUNTER — READMISSION MANAGEMENT (OUTPATIENT)
Dept: CALL CENTER | Facility: HOSPITAL | Age: 67
End: 2022-03-16

## 2022-03-16 NOTE — TELEPHONE ENCOUNTER
Hub staff attempted to follow warm transfer process and was unsuccessful     Caller: Navarro Bruno    Relationship to patient: Self    Best call back number: [phone]    Patient is needing: PT WAS IN TO SEE DR. BOLES YESTERDAY FOR STAPLE REMOVAL. HE WAS IN THE SHOWER THIS MORNING AND NOTICED THAT THERE IS STILL ONE STAPLE LEFT. HE WANTS TO KNOW IF IT CAN STAY IN UNTIL HIS FOLLOW UP APPT IN April OR WHAT HE NEEDS TO DO. PLEASE GIVE HIM A CALL TO DISCUSS WHEN  YOU HAVE A FREE MOMENT.

## 2022-03-16 NOTE — OUTREACH NOTE
General Surgery Week 2 Survey    Flowsheet Row Responses   [facility] patient discharged from? [place]   Does the patient have one of the following disease processes/diagnoses(primary or secondary)? General Surgery   Week 2 attempt successful? Yes   Call start time 1613   Call end time 1617   Discharge diagnosis Robotic converted to open ileocolonic resection   Meds reviewed with patient/caregiver? Yes   Is the patient having any side effects they believe may be caused by any medication additions or changes? No   Does the patient have all medications related to this admission filled (includes all antibiotics, pain medications, etc.) Yes   Is the patient taking all medications as directed (includes completed medication regime)? Yes   Does the patient have a follow up appointment scheduled with their surgeon? Yes   Has the patient kept scheduled appointments due by today? Yes   Comments Saw surgeon yesterday and had staples removed   Psychosocial issues? No   Did the patient receive a copy of their discharge instructions? Yes   Nursing interventions Reviewed instructions with patient   What is the patient's perception of their health status since discharge? Improving   Is the patient /caregiver able to teach back basic post-op care? Lifting as instructed by MD in discharge instructions, Practice 'cough and deep breath', Drive as instructed by MD in discharge instructions, Take showers only when approved by MD-sponge bathe until then   Is the patient/caregiver able to teach back signs and symptoms of incisional infection? Increased redness, swelling or pain at the incisonal site, Increased drainage or bleeding, Incisional warmth, Pus or odor from incision, Fever   Is the patient/caregiver able to teach back steps to recovery at home? Set small, achievable goals for return to baseline health, Rest and rebuild strength, gradually increase activity, Eat a well-balance diet   Is the patient/caregiver able to teach  back the hierarchy of who to call/visit for symptoms/problems? PCP, Specialist, Home health nurse, Urgent Care, ED, 911 Yes   Additional teach back comments States he is feeling fine.   Week 2 call completed? Yes   Revoked No further contact(revokes)-requires comment   Graduated/Revoked comments Denies questions or needs at this time          BLANCA CRAWLEY - Licensed Nurse

## 2022-03-17 ENCOUNTER — CLINICAL SUPPORT (OUTPATIENT)
Dept: SURGERY | Facility: CLINIC | Age: 67
End: 2022-03-17

## 2022-03-17 NOTE — PROGRESS NOTES
CC: Postoperative follow-up    S: Patient is a very pleasant 66-year-old male that is in my office today for follow-up after undergoing open ileocolic resection for ileal stricture on 3/2/2022.  Patient is doing extremely well and denies any complaint.  He has been having intermittent episodes of loose stools but denies any constipation or diarrhea.  Abdominal pain is minimal.  Denies any fevers or chills    O:   Alert, no acute distress  Breathing comfortable  Regular rate rhythm  Abdomen soft, mildly tender to palpation, nondistended, midline and laparoscopic incisions clean dry and intact with staplers    Pathology report:   Final Diagnosis   1. Ileum and Colon, Ileocolic Resection:               A. Acute ileitis with ulcer, stricture formation and associated serositis proximal to anastomosis.               B. Intact anastomosis with focal suture granuloma.               C. Ulceration focally extends to the proximal ileal margin of resection.                D. Benign viable distal colonic margin of resection.               E. Thirteen benign lymph nodes (0/13).     2. Umbilicus, Excision:               A. Benign epidermis and dermis with chronic active inflammation and reactive changes.               B. No evidence of dysplasia nor neoplasm identified.        Assessment and plan    66-year-old male status post ileocolic resection for recurrent ileal stricture.  Clinically he is doing great and denies any complaint.  Staples were removed and Steri-Strips were placed.  Instructed patient to avoid heavy lifting for the next 4 weeks.  Continue regular diet, stool softeners and fiber supplementation as needed.  Discussed with him about the need to follow-up in my office in 4 weeks for wound check.  We will plan for colonoscopy in 1 year.    He understood

## 2022-03-17 NOTE — PROGRESS NOTES
Patient came in to the office today to have 3 staples at his left upper abdominal trocar incision that still remain s/p open ileocolonic resection, lysis of adhesions on 3/2/22 by Dr. Cedeno. 3 tanner were removed. Patient was closely check for any additional remaining staples-none were found. Steri-strips with Mastisol was applied to the incision.

## 2022-03-22 ENCOUNTER — TELEPHONE (OUTPATIENT)
Dept: INTERNAL MEDICINE | Facility: CLINIC | Age: 67
End: 2022-03-22

## 2022-03-22 NOTE — TELEPHONE ENCOUNTER
PATIENT IS WANTING TO SCHEDULE A LAB APPT BEFORE HIS UPCOMING APPT IN April      PLEASE ADVISE   Navarro Bruno (Self) [phone] (H)

## 2022-04-04 DIAGNOSIS — I10 BENIGN ESSENTIAL HYPERTENSION: Chronic | ICD-10-CM

## 2022-04-09 DIAGNOSIS — E78.2 MIXED HYPERLIPIDEMIA: ICD-10-CM

## 2022-04-09 DIAGNOSIS — I10 BENIGN ESSENTIAL HYPERTENSION: Chronic | ICD-10-CM

## 2022-04-12 ENCOUNTER — LAB (OUTPATIENT)
Dept: LAB | Facility: HOSPITAL | Age: 67
End: 2022-04-12

## 2022-04-12 DIAGNOSIS — E03.9 PRIMARY HYPOTHYROIDISM: Chronic | ICD-10-CM

## 2022-04-12 DIAGNOSIS — R80.9 MICROALBUMINURIA: ICD-10-CM

## 2022-04-12 DIAGNOSIS — D64.9 CHRONIC ANEMIA: Chronic | ICD-10-CM

## 2022-04-12 DIAGNOSIS — E55.9 VITAMIN D DEFICIENCY: Chronic | ICD-10-CM

## 2022-04-12 DIAGNOSIS — Z87.39 HISTORY OF GOUT: Chronic | ICD-10-CM

## 2022-04-12 DIAGNOSIS — E78.2 MIXED HYPERLIPIDEMIA: Chronic | ICD-10-CM

## 2022-04-12 DIAGNOSIS — E11.9 TYPE 2 DIABETES MELLITUS WITHOUT COMPLICATION, WITHOUT LONG-TERM CURRENT USE OF INSULIN: Chronic | ICD-10-CM

## 2022-04-12 LAB
25(OH)D3 SERPL-MCNC: 32.1 NG/ML (ref 30–100)
ALBUMIN SERPL-MCNC: 4.2 G/DL (ref 3.5–5.2)
ALBUMIN UR-MCNC: 4.1 MG/DL
ALBUMIN/GLOB SERPL: 1.8 G/DL
ALP SERPL-CCNC: 85 U/L (ref 39–117)
ALT SERPL W P-5'-P-CCNC: 9 U/L (ref 1–41)
ANION GAP SERPL CALCULATED.3IONS-SCNC: 8 MMOL/L (ref 5–15)
AST SERPL-CCNC: 12 U/L (ref 1–40)
BILIRUB SERPL-MCNC: 0.4 MG/DL (ref 0–1.2)
BUN SERPL-MCNC: 9 MG/DL (ref 8–23)
BUN/CREAT SERPL: 9.1 (ref 7–25)
CALCIUM SPEC-SCNC: 8.8 MG/DL (ref 8.6–10.5)
CHLORIDE SERPL-SCNC: 104 MMOL/L (ref 98–107)
CK SERPL-CCNC: 49 U/L (ref 20–200)
CO2 SERPL-SCNC: 26 MMOL/L (ref 22–29)
CREAT SERPL-MCNC: 0.99 MG/DL (ref 0.76–1.27)
CREAT UR-MCNC: 115.2 MG/DL
DEPRECATED RDW RBC AUTO: 44.2 FL (ref 37–54)
EGFRCR SERPLBLD CKD-EPI 2021: 83.5 ML/MIN/1.73
ERYTHROCYTE [DISTWIDTH] IN BLOOD BY AUTOMATED COUNT: 14 % (ref 12.3–15.4)
GLOBULIN UR ELPH-MCNC: 2.3 GM/DL
GLUCOSE SERPL-MCNC: 140 MG/DL (ref 65–99)
HBA1C MFR BLD: 6.9 % (ref 4.8–5.6)
HCT VFR BLD AUTO: 38.4 % (ref 37.5–51)
HGB BLD-MCNC: 12.4 G/DL (ref 13–17.7)
MCH RBC QN AUTO: 28 PG (ref 26.6–33)
MCHC RBC AUTO-ENTMCNC: 32.3 G/DL (ref 31.5–35.7)
MCV RBC AUTO: 86.7 FL (ref 79–97)
MICROALBUMIN/CREAT UR: 35.6 MG/G
PLATELET # BLD AUTO: 265 10*3/MM3 (ref 140–450)
PMV BLD AUTO: 9.9 FL (ref 6–12)
POTASSIUM SERPL-SCNC: 4.4 MMOL/L (ref 3.5–5.2)
PROT SERPL-MCNC: 6.5 G/DL (ref 6–8.5)
RBC # BLD AUTO: 4.43 10*6/MM3 (ref 4.14–5.8)
SODIUM SERPL-SCNC: 138 MMOL/L (ref 136–145)
T3FREE SERPL-MCNC: 2.66 PG/ML (ref 2–4.4)
T4 FREE SERPL-MCNC: 1.12 NG/DL (ref 0.93–1.7)
TSH SERPL DL<=0.05 MIU/L-ACNC: 2.54 UIU/ML (ref 0.27–4.2)
URATE SERPL-MCNC: 3.7 MG/DL (ref 3.4–7)
WBC NRBC COR # BLD: 9.69 10*3/MM3 (ref 3.4–10.8)

## 2022-04-12 PROCEDURE — 36415 COLL VENOUS BLD VENIPUNCTURE: CPT

## 2022-04-12 PROCEDURE — 82306 VITAMIN D 25 HYDROXY: CPT

## 2022-04-12 PROCEDURE — 80053 COMPREHEN METABOLIC PANEL: CPT

## 2022-04-12 PROCEDURE — 84481 FREE ASSAY (FT-3): CPT

## 2022-04-12 PROCEDURE — 82043 UR ALBUMIN QUANTITATIVE: CPT

## 2022-04-12 PROCEDURE — 83036 HEMOGLOBIN GLYCOSYLATED A1C: CPT

## 2022-04-12 PROCEDURE — 83704 LIPOPROTEIN BLD QUAN PART: CPT

## 2022-04-12 PROCEDURE — 85027 COMPLETE CBC AUTOMATED: CPT

## 2022-04-12 PROCEDURE — 84439 ASSAY OF FREE THYROXINE: CPT

## 2022-04-12 PROCEDURE — 84443 ASSAY THYROID STIM HORMONE: CPT

## 2022-04-12 PROCEDURE — 82570 ASSAY OF URINE CREATININE: CPT

## 2022-04-12 PROCEDURE — 84550 ASSAY OF BLOOD/URIC ACID: CPT

## 2022-04-12 PROCEDURE — 80061 LIPID PANEL: CPT

## 2022-04-12 PROCEDURE — 82550 ASSAY OF CK (CPK): CPT

## 2022-04-12 RX ORDER — ATORVASTATIN CALCIUM 80 MG/1
TABLET, FILM COATED ORAL
Qty: 90 TABLET | Refills: 0 | Status: SHIPPED | OUTPATIENT
Start: 2022-04-12 | End: 2022-09-16

## 2022-04-14 LAB
CHOLEST SERPL-MCNC: 113 MG/DL (ref 100–199)
HDL SERPL-SCNC: 31.2 UMOL/L
HDLC SERPL-MCNC: 42 MG/DL
LDL SERPL QN: 20.6 NM
LDL SERPL-SCNC: 457 NMOL/L
LDL SMALL SERPL-SCNC: 277 NMOL/L
LDLC SERPL CALC-MCNC: 41 MG/DL (ref 0–99)
TRIGL SERPL-MCNC: 185 MG/DL (ref 0–149)

## 2022-04-15 ENCOUNTER — OFFICE VISIT (OUTPATIENT)
Dept: SURGERY | Facility: CLINIC | Age: 67
End: 2022-04-15

## 2022-04-15 VITALS — HEIGHT: 75 IN | WEIGHT: 227 LBS | BODY MASS INDEX: 28.23 KG/M2

## 2022-04-15 DIAGNOSIS — Z51.89 VISIT FOR WOUND CHECK: ICD-10-CM

## 2022-04-15 DIAGNOSIS — Z09 POSTOP CHECK: Primary | ICD-10-CM

## 2022-04-15 PROCEDURE — 99024 POSTOP FOLLOW-UP VISIT: CPT | Performed by: SURGERY

## 2022-04-15 NOTE — PROGRESS NOTES
CC: Postoperative follow-up     S: Patient is a very pleasant 66-year-old male that is in my office today for follow-up after undergoing open ileocolic resection for ileal stricture on 3/2/2022.   He is doing extremely well.  He reports having normal bowel function and had only 2 episodes of diarrhea.  He had well controlled abdominal pain with Tylenol.  He feels much better than preop.    O:   Alert, no acute distress  Breathing comfortable  Abdomen soft, nontender nondistended, incisions are well-healed, no evidence of hernia    Assessment and plan    66-year-old male status post ileocolic resection for recurrent ileal stricture.  Doing extremely well, pain controlled, wounds healing great.  Discussed with him about the need to continue his healthy habits.  Avoid NSAIDs and slowly start physical activity.    He is to have colonoscopy in 1 year.  Recall was sent    He understood    Guille Cedeno MD, FACS  General, Minimally Invasive and Endoscopic Surgery  [facility]    [address], [address]  [place], [place], [address]  P: [phone]  F: [phone]

## 2022-04-18 ENCOUNTER — OFFICE VISIT (OUTPATIENT)
Dept: INTERNAL MEDICINE | Facility: CLINIC | Age: 67
End: 2022-04-18

## 2022-04-18 VITALS
SYSTOLIC BLOOD PRESSURE: 128 MMHG | RESPIRATION RATE: 18 BRPM | DIASTOLIC BLOOD PRESSURE: 62 MMHG | OXYGEN SATURATION: 98 % | BODY MASS INDEX: 28.03 KG/M2 | HEART RATE: 75 BPM | HEIGHT: 75 IN | WEIGHT: 225.4 LBS

## 2022-04-18 DIAGNOSIS — Z91.199 MEDICAL NON-COMPLIANCE: Chronic | ICD-10-CM

## 2022-04-18 DIAGNOSIS — R80.9 TYPE 2 DIABETES MELLITUS WITH MICROALBUMINURIA, WITHOUT LONG-TERM CURRENT USE OF INSULIN: Primary | Chronic | ICD-10-CM

## 2022-04-18 DIAGNOSIS — E55.9 VITAMIN D DEFICIENCY: ICD-10-CM

## 2022-04-18 DIAGNOSIS — D64.9 CHRONIC ANEMIA: Chronic | ICD-10-CM

## 2022-04-18 DIAGNOSIS — Z51.81 THERAPEUTIC DRUG MONITORING: ICD-10-CM

## 2022-04-18 DIAGNOSIS — E11.29 TYPE 2 DIABETES MELLITUS WITH MICROALBUMINURIA, WITHOUT LONG-TERM CURRENT USE OF INSULIN: Primary | Chronic | ICD-10-CM

## 2022-04-18 DIAGNOSIS — Z00.00 ROUTINE PHYSICAL EXAMINATION: ICD-10-CM

## 2022-04-18 DIAGNOSIS — N40.1 BENIGN NON-NODULAR PROSTATIC HYPERPLASIA WITH LOWER URINARY TRACT SYMPTOMS: Chronic | ICD-10-CM

## 2022-04-18 DIAGNOSIS — R35.1 NOCTURIA: Chronic | ICD-10-CM

## 2022-04-18 DIAGNOSIS — E66.3 OVERWEIGHT (BMI 25.0-29.9): ICD-10-CM

## 2022-04-18 DIAGNOSIS — N32.89 IRRITABLE BLADDER: Chronic | ICD-10-CM

## 2022-04-18 DIAGNOSIS — E03.9 PRIMARY HYPOTHYROIDISM: Chronic | ICD-10-CM

## 2022-04-18 DIAGNOSIS — R80.9 MICROALBUMINURIA: Chronic | ICD-10-CM

## 2022-04-18 DIAGNOSIS — F17.210 NICOTINE DEPENDENCE, CIGARETTES, UNCOMPLICATED: Chronic | ICD-10-CM

## 2022-04-18 DIAGNOSIS — E78.2 MIXED HYPERLIPIDEMIA: Chronic | ICD-10-CM

## 2022-04-18 DIAGNOSIS — Z90.49 HISTORY OF PARTIAL COLECTOMY: Chronic | ICD-10-CM

## 2022-04-18 DIAGNOSIS — I10 BENIGN ESSENTIAL HYPERTENSION: Chronic | ICD-10-CM

## 2022-04-18 DIAGNOSIS — Z87.39 HISTORY OF GOUT: Chronic | ICD-10-CM

## 2022-04-18 PROBLEM — R19.4 CHANGE IN BOWEL HABITS: Status: RESOLVED | Noted: 2021-08-03 | Resolved: 2022-04-18

## 2022-04-18 PROBLEM — K56.699 STRICTURE OF BOWEL (HCC): Status: RESOLVED | Noted: 2021-08-17 | Resolved: 2022-04-18

## 2022-04-18 PROBLEM — K59.00 CONSTIPATION: Status: RESOLVED | Noted: 2021-08-11 | Resolved: 2022-04-18

## 2022-04-18 PROBLEM — R15.2 FECAL URGENCY: Status: RESOLVED | Noted: 2021-08-03 | Resolved: 2022-04-18

## 2022-04-18 PROBLEM — K63.3 ILEUM ULCER: Status: RESOLVED | Noted: 2021-11-19 | Resolved: 2022-04-18

## 2022-04-18 PROCEDURE — 99214 OFFICE O/P EST MOD 30 MIN: CPT | Performed by: INTERNAL MEDICINE

## 2022-04-18 RX ORDER — ALLOPURINOL 300 MG/1
TABLET ORAL
Qty: 90 TABLET | Refills: 2
Start: 2022-04-18 | End: 2022-11-22

## 2022-04-18 NOTE — PROGRESS NOTES
04/18/2022    Patient Information  Navarro Bruno                                                                                          [address]  [address]  [facility] [address]      1955  [unfilled]  There is no work phone number on file.    Chief Complaint:     Follow-up blood work in order to monitor chronic medical issues listed in history of present illness.  No new acute complaints.    History of Present Illness:    Patient with history of type 2 diabetes, microalbuminuria, hyperlipidemia, chronic anemia, hypertension, hypothyroidism, BPH with nocturia and irritable bladder, history of recent partial colectomy for ileocolonic stricture, gout, nicotine dependence from cigarettes, nonmorbid obesity, history of medical noncompliance.  He presents today for follow-up with lab prior in order to monitor his chronic medical issues.  Patient also was admitted to the hospital briefly for ileocolic resection for a stricture.  He has done well postoperatively.  Patient did not follow-up for transition of care visit.  Past medical history reviewed and updated were necessary including health maintenance parameters.  This reveals he needs a diabetic eye exam which I encouraged him to get.    Review of Systems   Constitutional: Negative.   HENT: Negative.    Eyes: Negative.    Cardiovascular: Negative.    Respiratory: Negative.    Endocrine: Negative.    Hematologic/Lymphatic: Negative.    Skin: Negative.    Musculoskeletal: Negative.    Gastrointestinal: Negative.    Genitourinary: Negative.    Neurological: Negative.    Psychiatric/Behavioral: Negative.    Allergic/Immunologic: Negative.        Active Problems:    Patient Active Problem List   Diagnosis   • Chronic anemia   • Benign essential hypertension   • Hyperlipidemia   • Primary hypothyroidism   • Irritable bladder   • Nocturia   • Type 2 diabetes mellitus with microalbuminuria, without long-term current use of insulin (HCC)   • Vitamin D  deficiency   • Therapeutic drug monitoring   • Routine physical examination   • Chronic diarrhea   • History of partial colectomy, 3/6/2022--ileocolic resection for stricture.  12/17/2014--ileo-cecal colectomy with primary reanastomosis.  Obstruction secondary to NSAID-induced inflamed ileum.   • Gout   • Benign prostatic hypertrophy   • Diabetic eye exam (HCC)   • Diabetic foot exam   • Nicotine dependence, cigarettes, uncomplicated   • Medical non-compliance   • Overweight (BMI 25.0-29.9)   • Microalbuminuria         Past Medical History:   Diagnosis Date   • Benign essential hypertension 04/19/2007 04/19/2007--treatment for hypertension begun.   • Benign prostatic hypertrophy 07/25/2016 07/25/2016--patient reports irritable bladder symptoms have resolved and he stopped using the Myrbetriq.  05/11/2015--patient presents with a one-month history of urinary urgency, occasional urge incontinence without dysuria. Patient has nocturia 2-3 times per night. Myrbetric 50 mg, one half by mouth daily initiated.   • Chronic anemia 02/04/2015 07/19/2016--hemoglobin slightly low at 13.3.  Hematocrit normal at 42.0.  05/11/2015--patient seen in follow-up and had a recent colonoscopy which was negative. Serum iron studies were normal. Homocystine and methylmalonic acid were normal. No further workup. Recent CBC reveals a slightly low hemoglobin but a normal hematocrit.   02/04/2015--routine CBC reveals a low hemoglobin of 11.5, hematocrit   • Chronic diarrhea 04/18/2016 07/25/2016--patient seen in follow-up and reports his diarrhea has improved but not resolved.  He reports that he WelChol did not help but he only took a maximum of 2 pills per day.  04/18/2016--patient with a history of partial colectomy performed in 2014 performed for an inflamed terminal ileum.  Part of the terminal ileum in the cecum removed.  Since that time patient presents with chronic diar   • Diabetic eye exam (HCC) 02/03/2017     02/03/2017--patient reports he has not had a diabetic eye examination.  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  Order given.   • Diabetic foot exam 02/03/2017 02/03/2017--diabetic foot examination reveals no ulcers and no pre-ulcerative calluses.  He was left great toe and second toe have been ambulated from a lawnmower accident as a teenager.  Sensation is intact.   • Gout 07/15/2010    07/15/2010--patient presented with complaints of left foot pain. Initial diagnosis of gout. Uric acid 8.3. Treatment begun with allopurinol.   • History of echocardiogram 12/14/2014 12/14/2014--echocardiogram reveals left ventricular chamber size is normal. Mild concentric left ventricular hypertrophy. Normal left ventricular systolic function with ejection fraction of 58%. Abnormal left ventricular diastolic function is observed. Left Atrium normal. Right ventricular cavity size is mildly enlarged. The right ventricular global systolic function is normal. Right atrium is nor   • History of Hepatitis, infectious Age 18    18 years of age--patient had infectious hepatitis. Exact type unknown.   • History of partial colectomy, 3/6/2022--ileocolic resection for stricture.  12/17/2014--ileo-cecal colectomy with primary reanastomosis.  Obstruction secondary to NSAID-induced inflamed ileum. 12/17/2014 March 6, 2022--robotic assisted laparoscopic converted to open ileocolic resection for small bowel stricture.  12/17/2014--patient presented with a small bowel obstruction secondary to inflamed terminal ileum. He underwent ileo-cecal colectomy with primary reanastomosis.   • History of small bowel obstruction 12/15/2014    01/19/2015--patient seen in follow-up and is doing well. No change in current medical regimen at the present time. Set up fasting lab and follow up. I explained to patient that we will need to monitor him for the development of vitamin B12 deficiency. He will certainly be at risk  for this.   12/17/2014--patient presented with a small bowel obstruction secondary to inflamed terminal ileum. He under   • Hyperlipidemia 06/13/2007 06/13/2007--treatment for hyperlipidemia begun.   • Hypothyroidism 05/08/2015 05/19/2017--TSH elevated at 5.33.  Free T4 and free T3 are normal.  It appears the patient truly has hypothyroidism.  Levothyroxine 50 µg per day initiated.  01/27/2016--repeat thyroid function tests normal.  11/11/2015--TSH elevated at 5.27. Free T4 low normal at 0.92. TPO antibodies less than 6. Repeat thyroid function tests ordered.  05/08/2015--TSH mildly elevated at 4.5. Free T4 low normal at   • Irritable bladder 05/11/2016 02/03/2017--patient reports that he is irritable bladder symptoms have returned but they seem to be intermittent.  He describes urgency and frequency.  However, he was diabetes is not under optimal control and could be playing a role.  His A1c is less than 8 at the present time.  In the past Myrbetriq seemed to be helpful and I will give patient samples to have at the time that he needs it.  He is   • Medical non-compliance 05/02/2019   • Microalbuminuria 08/03/2021    August 3, 2021--routine follow-up.  Patient is diabetic and his urine microalbumin/creatinine ratio returned elevated at 129.4.  May need low-dose ACE inhibitor.   • Nicotine dependence, cigarettes, uncomplicated 05/02/2019   • Nocturia 03/29/2016 02/03/2017--patient seen in follow-up and continues to have nocturia 2-3 times per night.  He has irritable bladder symptoms consisting of urgency and occasional incontinence has returned as well.  Myrbetriq samples given and patient can use that as needed.  07/25/2016--patient reports irritable bladder symptoms have resolved and he stopped using the Myrbetriq.  05/11/2015--patient presents with a   • Overweight (BMI 25.0-29.9) 08/03/2021   • Stress incontinence    • Type 2 diabetes mellitus with microalbuminuria, without long-term current use of  insulin ([facility]) 03/09/2007 03/09/2007--patient presented with polyuria and polydipsia. Initial diagnosis of diabetes. Serum glucose 252, initial hemoglobin A1c 10.4.   • Type 2 diabetes mellitus without complication, without long-term current use of insulin ([facility]) 03/09/2007 03/09/2007--patient presented with polyuria and polydipsia. Initial diagnosis of diabetes. Serum glucose 252, initial hemoglobin A1c 10.4.   • Vitamin D deficiency 04/12/2016         Past Surgical History:   Procedure Laterality Date   • AMPUTATION FOOT / TOE  18 years old    18 years of age--left great toe and left second toe amputation from a lawnmower accident.   • APPENDECTOMY  11 years old    11 years of age.   • CATARACT EXTRACTION Bilateral 2013 2013--patient reports he had bilateral cataract surgery about 4 years ago.  Intraocular lenses placed.   • COLECTOMY PARTIAL / TOTAL  12/17/2014 12/17/2014--patient presented with a small bowel obstruction secondary to inflamed terminal ileum. He underwent ileo-cecal colectomy with primary reanastomosis.   • COLECTOMY PARTIAL / TOTAL N/A 03/06/2022 March 6, 2022--robotic assisted laparoscopic converted to open ileocolic resection for small bowel stricture.   • COLON RESECTION N/A 03/02/2022    Procedure: Robotic assisted laparoscopic to Open ileocolic resection with anastomosis;  Surgeon: Guille Cedeno MD;  Location: [facility] OR;  Service: Robotics - [facility];  Laterality: N/A;   • COLONOSCOPY  04/06/2015 04/06/2015--colonoscopy revealed small internal hemorrhoids. Well healed ileocolic anastomosis. Fair prep.   • COLONOSCOPY N/A 08/17/2021    Procedure: COLONOSCOPY TO ANASTAMOSIS;  Surgeon: Guille Cedeno MD;  Location: [facility] ENDOSCOPY;  Service: General;  Laterality: N/A;  PREOP/ SCREENING  POSTOP/ HEMORRHOIDS   • EXPLORATORY LAPAROTOMY  12/17/2014 12/17/2014--patient presented with a small bowel obstruction secondary to inflamed terminal ileum. He  underwent ileo-cecal colectomy with primary reanastomosis.         No Known Allergies        Current Outpatient Medications:   •  allopurinol (ZYLOPRIM) 300 MG tablet, Take 1 p.o. daily for gout, Disp: 90 tablet, Rfl: 2  •  atorvastatin (LIPITOR) 80 MG tablet, TAKE 1 TABLET BY MOUTH EVERY DAY FOR CHOLESTEROL, Disp: 90 tablet, Rfl: 0  •  B-D ULTRAFINE III SHORT PEN 31G X 8 MM misc, Use with Victoza injections daily., Disp: , Rfl: 3  •  Bioflavonoid Products (SUPER C-500 PO), Take 500 mg by mouth Daily., Disp: , Rfl:   •  Calcium Citrate-Vitamin D (CALCIUM CITRATE + D3 MAXIMUM PO), Take 1 tablet by mouth Daily., Disp: , Rfl:   •  levothyroxine (SYNTHROID, LEVOTHROID) 50 MCG tablet, TAKE 1 TABLET BY MOUTH EVERY DAY FOR LOW THYROID, Disp: 90 tablet, Rfl: 0  •  metoprolol tartrate (LOPRESSOR) 25 MG tablet, TAKE 1 TABLET BY MOUTH TWO TIMES A DAY FOR HIGH BLOOD PRESSURE, Disp: 60 tablet, Rfl: 3  •  Probiotic Product (PROBIOTIC PO), Take 1 tablet by mouth Daily., Disp: , Rfl:   •  Semaglutide (Rybelsus) 7 MG tablet, Take 7 mg by mouth Daily. For diabetes, Disp: 90 tablet, Rfl: 3  •  sitaGLIPtin-metFORMIN (Janumet)  MG per tablet, Take 1 tablet by mouth 2 (Two) Times a Day With Meals., Disp: , Rfl:   •  vitamin D3 125 MCG (5000 UT) capsule capsule, Take 5,000 Units by mouth Daily., Disp: , Rfl:       Family History   Problem Relation Age of Onset   • Scoliosis Mother         Amyotrophic Lateral Sclerosis   • Stroke Father         Father had multiple strokes.   • Diabetes Father         Type 2   • Hypertension Father    • Diabetes Paternal Grandfather         Type 2   • Malig Hyperthermia Neg Hx          Social History     Socioeconomic History   • Marital status:    • Years of education: 14   Tobacco Use   • Smoking status: Current Every Day Smoker     Packs/day: 1.00     Types: Cigarettes   • Smokeless tobacco: Never Used   • Tobacco comment: STARTED IN HIGH SCHOOL OFF AND ON, QUIT FOR 10 YEARS, STARTED AGAIN  "9 YEARS AGO   Vaping Use   • Vaping Use: Never used   Substance and Sexual Activity   • Alcohol use: Not Currently   • Drug use: Not Currently     Types: Marijuana     Comment: Quit 20 years ago.    • Sexual activity: Defer         Vitals:    04/18/22 0811   BP: 128/62   Pulse: 75   Resp: 18   SpO2: 98%   Weight: 102 kg (225 lb 6.4 oz)   Height: 190.5 cm (75\")        Body mass index is 28.17 kg/m².      Physical Exam:    General: Alert and oriented x 3.  No acute distress.  Overweight.  Normal affect.  HEENT: Pupils equal, round, reactive to light; extraocular movements intact; sclerae nonicteric; pharynx, ear canals and TMs normal.  Neck: Without JVD, thyromegaly, bruit, or adenopathy.  Lungs: Clear to auscultation in all fields.  Heart: Regular rate and rhythm without murmur, rub, gallop, or click.  Abdomen: Soft, nontender, without hepatosplenomegaly or hernia.  Bowel sounds normal.  : Deferred.  Rectal: Deferred.  Extremities: Without clubbing, cyanosis, edema, or pulse deficit.  Neurologic: Intact without focal deficit.  Normal station and gait observed during ingress and egress from the examination room.  Skin: Without significant lesion.  Musculoskeletal: Unremarkable.    Lab/other results:    CBC is normal except hemoglobin low at 12.4.  Hematocrit normal at 38.4.  CPK normal.  CMP normal except glucose 140.  Hemoglobin A1c 6.9.  NMR reveals a total cholesterol of 113.  Triglycerides slightly elevated 185, LDL particle number excellent at 457.  HDL particle number normal at 31.2.  Microalbumin/creatinine ratio 35.6.  Vitamin D normal.  Thyroid function test normal.  Uric acid normal at 3.7.    Assessment/Plan:     Diagnosis Plan   1. Type 2 diabetes mellitus with microalbuminuria, without long-term current use of insulin ([facility])  Comprehensive Metabolic Panel    Hemoglobin A1c    Urinalysis With Microscopic If Indicated (No Culture) - Urine, Clean Catch   2. Microalbuminuria     3. Hyperlipidemia  CK    " Comprehensive Metabolic Panel    NMR LipoProfile    Anemia, Megaloblastic, Serum   4. Chronic anemia  CBC (No Diff)    Anemia, Megaloblastic, Serum   5. Benign essential hypertension     6. Primary hypothyroidism  TSH    T4, Free    T3, Free   7. Irritable bladder     8. Benign prostatic hypertrophy  PSA DIAGNOSTIC   9. Nocturia     10. History of partial colectomy, 3/6/2022--ileocolic resection for stricture.  12/17/2014--ileo-cecal colectomy with primary reanastomosis.  Obstruction secondary to NSAID-induced inflamed ileum.     11. Vitamin D deficiency  Vitamin D 25 Hydroxy   12. Gout  allopurinol (ZYLOPRIM) 300 MG tablet   13. Nicotine dependence, cigarettes, uncomplicated     14. Overweight (BMI 25.0-29.9)     15. Medical non-compliance     16. Therapeutic drug monitoring     17. Routine physical examination  CBC (No Diff)    CK    Comprehensive Metabolic Panel    Hemoglobin A1c    NMR LipoProfile    Vitamin D 25 Hydroxy    Urinalysis With Microscopic If Indicated (No Culture) - Urine, Clean Catch    TSH    T4, Free    T3, Free    PSA DIAGNOSTIC    Anemia, Megaloblastic, Serum     Patient has type 2 diabetes is under good control.  He has mild microalbuminuria which is stable.  Hyperlipidemia is under good control.  His blood pressure is also under good control.  He has primary hypothyroidism and is therapeutic on current dose of levothyroxine.  Irritable bladder/BPH with nocturia symptoms are currently tolerable without medication.  He had a recent partial ileocolic resection for stricture and we will need to monitor him closely for vitamin B12 deficiency.  Gout is stable on allopurinol with excellent uric acid levels.  Patient continues to smoke cigarettes but is not ready to commit to smoking cessation.  He has a previous history of nonmorbid obesity but he has lost weight and is now just overweight.  Medical noncompliance history Maybe improving.    Plan is as follows: I encouraged patient to get diabetic  eye exam.  We will not make any changes in current medical regimen.  Continue with low-carb diet and weight loss efforts.  Patient will follow-up after August 3, 2022 with lab prior for his annual physical or follow-up as needed.      Procedures

## 2022-06-16 DIAGNOSIS — E03.9 HYPOTHYROIDISM, UNSPECIFIED TYPE: Chronic | ICD-10-CM

## 2022-06-17 RX ORDER — LEVOTHYROXINE SODIUM 0.05 MG/1
TABLET ORAL
Qty: 90 TABLET | Refills: 0 | Status: SHIPPED | OUTPATIENT
Start: 2022-06-17 | End: 2022-09-16

## 2022-07-18 DIAGNOSIS — E11.9 TYPE 2 DIABETES MELLITUS WITHOUT COMPLICATION: ICD-10-CM

## 2022-07-25 DIAGNOSIS — E11.29 TYPE 2 DIABETES MELLITUS WITH MICROALBUMINURIA, WITHOUT LONG-TERM CURRENT USE OF INSULIN: Primary | ICD-10-CM

## 2022-07-25 DIAGNOSIS — R80.9 TYPE 2 DIABETES MELLITUS WITH MICROALBUMINURIA, WITHOUT LONG-TERM CURRENT USE OF INSULIN: Primary | ICD-10-CM

## 2022-07-25 RX ORDER — SITAGLIPTIN AND METFORMIN HYDROCHLORIDE 1000; 50 MG/1; MG/1
TABLET, FILM COATED ORAL
Qty: 60 TABLET | Refills: 11 | Status: SHIPPED | OUTPATIENT
Start: 2022-07-25

## 2022-07-26 RX ORDER — SITAGLIPTIN AND METFORMIN HYDROCHLORIDE 1000; 50 MG/1; MG/1
TABLET, FILM COATED, EXTENDED RELEASE ORAL
Qty: 60 TABLET | Refills: 0 | Status: SHIPPED | OUTPATIENT
Start: 2022-07-26

## 2022-09-16 DIAGNOSIS — E03.9 HYPOTHYROIDISM, UNSPECIFIED TYPE: Chronic | ICD-10-CM

## 2022-09-16 DIAGNOSIS — E78.2 MIXED HYPERLIPIDEMIA: ICD-10-CM

## 2022-09-16 DIAGNOSIS — E11.9 TYPE 2 DIABETES MELLITUS WITHOUT COMPLICATION, WITHOUT LONG-TERM CURRENT USE OF INSULIN: Chronic | ICD-10-CM

## 2022-09-16 RX ORDER — ATORVASTATIN CALCIUM 80 MG/1
TABLET, FILM COATED ORAL
Qty: 90 TABLET | Refills: 0 | Status: SHIPPED | OUTPATIENT
Start: 2022-09-16 | End: 2023-02-15

## 2022-09-16 RX ORDER — LEVOTHYROXINE SODIUM 0.05 MG/1
TABLET ORAL
Qty: 90 TABLET | Refills: 0 | Status: SHIPPED | OUTPATIENT
Start: 2022-09-16 | End: 2022-12-12

## 2022-09-16 RX ORDER — ORAL SEMAGLUTIDE 7 MG/1
TABLET ORAL
Qty: 90 TABLET | Refills: 0 | Status: SHIPPED | OUTPATIENT
Start: 2022-09-16 | End: 2022-12-19

## 2022-09-19 ENCOUNTER — LAB (OUTPATIENT)
Dept: LAB | Facility: HOSPITAL | Age: 67
End: 2022-09-19

## 2022-09-19 DIAGNOSIS — E03.9 PRIMARY HYPOTHYROIDISM: Chronic | ICD-10-CM

## 2022-09-19 DIAGNOSIS — E11.29 TYPE 2 DIABETES MELLITUS WITH MICROALBUMINURIA, WITHOUT LONG-TERM CURRENT USE OF INSULIN: Chronic | ICD-10-CM

## 2022-09-19 DIAGNOSIS — R80.9 TYPE 2 DIABETES MELLITUS WITH MICROALBUMINURIA, WITHOUT LONG-TERM CURRENT USE OF INSULIN: Chronic | ICD-10-CM

## 2022-09-19 DIAGNOSIS — N40.1 BENIGN NON-NODULAR PROSTATIC HYPERPLASIA WITH LOWER URINARY TRACT SYMPTOMS: Chronic | ICD-10-CM

## 2022-09-19 DIAGNOSIS — E55.9 VITAMIN D DEFICIENCY: ICD-10-CM

## 2022-09-19 DIAGNOSIS — D64.9 CHRONIC ANEMIA: Chronic | ICD-10-CM

## 2022-09-19 DIAGNOSIS — Z00.00 ROUTINE PHYSICAL EXAMINATION: ICD-10-CM

## 2022-09-19 DIAGNOSIS — E78.2 MIXED HYPERLIPIDEMIA: Chronic | ICD-10-CM

## 2022-09-19 LAB
25(OH)D3 SERPL-MCNC: 37.8 NG/ML (ref 30–100)
ALBUMIN SERPL-MCNC: 4.1 G/DL (ref 3.5–5.2)
ALBUMIN/GLOB SERPL: 1.9 G/DL
ALP SERPL-CCNC: 97 U/L (ref 39–117)
ALT SERPL W P-5'-P-CCNC: 9 U/L (ref 1–41)
ANION GAP SERPL CALCULATED.3IONS-SCNC: 9 MMOL/L (ref 5–15)
AST SERPL-CCNC: 16 U/L (ref 1–40)
BILIRUB SERPL-MCNC: 0.4 MG/DL (ref 0–1.2)
BILIRUB UR QL STRIP: NEGATIVE
BUN SERPL-MCNC: 14 MG/DL (ref 8–23)
BUN/CREAT SERPL: 16.5 (ref 7–25)
CALCIUM SPEC-SCNC: 9.2 MG/DL (ref 8.6–10.5)
CHLORIDE SERPL-SCNC: 104 MMOL/L (ref 98–107)
CK SERPL-CCNC: 80 U/L (ref 20–200)
CLARITY UR: CLEAR
CO2 SERPL-SCNC: 23 MMOL/L (ref 22–29)
COLOR UR: YELLOW
CREAT SERPL-MCNC: 0.85 MG/DL (ref 0.76–1.27)
DEPRECATED RDW RBC AUTO: 47.9 FL (ref 37–54)
EGFRCR SERPLBLD CKD-EPI 2021: 94.6 ML/MIN/1.73
ERYTHROCYTE [DISTWIDTH] IN BLOOD BY AUTOMATED COUNT: 14.6 % (ref 12.3–15.4)
GLOBULIN UR ELPH-MCNC: 2.2 GM/DL
GLUCOSE SERPL-MCNC: 165 MG/DL (ref 65–99)
GLUCOSE UR STRIP-MCNC: NEGATIVE MG/DL
HBA1C MFR BLD: 7.4 % (ref 4.8–5.6)
HCT VFR BLD AUTO: 41.2 % (ref 37.5–51)
HGB BLD-MCNC: 13 G/DL (ref 13–17.7)
HGB UR QL STRIP.AUTO: NEGATIVE
KETONES UR QL STRIP: NEGATIVE
LEUKOCYTE ESTERASE UR QL STRIP.AUTO: NEGATIVE
MCH RBC QN AUTO: 28.3 PG (ref 26.6–33)
MCHC RBC AUTO-ENTMCNC: 31.6 G/DL (ref 31.5–35.7)
MCV RBC AUTO: 89.6 FL (ref 79–97)
NITRITE UR QL STRIP: NEGATIVE
PH UR STRIP.AUTO: 6 [PH] (ref 5–8)
PLATELET # BLD AUTO: 233 10*3/MM3 (ref 140–450)
PMV BLD AUTO: 10.6 FL (ref 6–12)
POTASSIUM SERPL-SCNC: 4.4 MMOL/L (ref 3.5–5.2)
PROT SERPL-MCNC: 6.3 G/DL (ref 6–8.5)
PROT UR QL STRIP: NEGATIVE
PSA SERPL-MCNC: 1.08 NG/ML (ref 0–4)
RBC # BLD AUTO: 4.6 10*6/MM3 (ref 4.14–5.8)
SODIUM SERPL-SCNC: 136 MMOL/L (ref 136–145)
SP GR UR STRIP: 1.02 (ref 1–1.03)
T3FREE SERPL-MCNC: 2.39 PG/ML (ref 2–4.4)
T4 FREE SERPL-MCNC: 1.12 NG/DL (ref 0.93–1.7)
TSH SERPL DL<=0.05 MIU/L-ACNC: 1.9 UIU/ML (ref 0.27–4.2)
UROBILINOGEN UR QL STRIP: NORMAL
WBC NRBC COR # BLD: 11.51 10*3/MM3 (ref 3.4–10.8)

## 2022-09-19 PROCEDURE — 80053 COMPREHEN METABOLIC PANEL: CPT

## 2022-09-19 PROCEDURE — 84439 ASSAY OF FREE THYROXINE: CPT

## 2022-09-19 PROCEDURE — 85027 COMPLETE CBC AUTOMATED: CPT

## 2022-09-19 PROCEDURE — 82136 AMINO ACIDS QUANT 2-5: CPT

## 2022-09-19 PROCEDURE — 83036 HEMOGLOBIN GLYCOSYLATED A1C: CPT

## 2022-09-19 PROCEDURE — 83704 LIPOPROTEIN BLD QUAN PART: CPT

## 2022-09-19 PROCEDURE — 82306 VITAMIN D 25 HYDROXY: CPT

## 2022-09-19 PROCEDURE — 84443 ASSAY THYROID STIM HORMONE: CPT

## 2022-09-19 PROCEDURE — 36415 COLL VENOUS BLD VENIPUNCTURE: CPT

## 2022-09-19 PROCEDURE — 83918 ORGANIC ACIDS TOTAL QUANT: CPT

## 2022-09-19 PROCEDURE — 81003 URINALYSIS AUTO W/O SCOPE: CPT

## 2022-09-19 PROCEDURE — 84153 ASSAY OF PSA TOTAL: CPT

## 2022-09-19 PROCEDURE — 80061 LIPID PANEL: CPT

## 2022-09-19 PROCEDURE — 82550 ASSAY OF CK (CPK): CPT

## 2022-09-19 PROCEDURE — 84481 FREE ASSAY (FT-3): CPT

## 2022-09-21 LAB
CHOLEST SERPL-MCNC: 124 MG/DL (ref 100–199)
HDL SERPL-SCNC: 32.3 UMOL/L
HDLC SERPL-MCNC: 52 MG/DL
LDL SERPL QN: 21.5 NM
LDL SERPL-SCNC: 564 NMOL/L
LDL SMALL SERPL-SCNC: 163 NMOL/L
LDLC SERPL CALC-MCNC: 50 MG/DL (ref 0–99)
TRIGL SERPL-MCNC: 122 MG/DL (ref 0–149)

## 2022-09-26 ENCOUNTER — OFFICE VISIT (OUTPATIENT)
Dept: INTERNAL MEDICINE | Facility: CLINIC | Age: 67
End: 2022-09-26

## 2022-09-26 VITALS
SYSTOLIC BLOOD PRESSURE: 136 MMHG | BODY MASS INDEX: 29.91 KG/M2 | RESPIRATION RATE: 18 BRPM | HEIGHT: 75 IN | OXYGEN SATURATION: 97 % | HEART RATE: 67 BPM | WEIGHT: 240.6 LBS | DIASTOLIC BLOOD PRESSURE: 80 MMHG

## 2022-09-26 DIAGNOSIS — R35.1 NOCTURIA: Chronic | ICD-10-CM

## 2022-09-26 DIAGNOSIS — E11.9 DIABETIC EYE EXAM: ICD-10-CM

## 2022-09-26 DIAGNOSIS — Z51.81 THERAPEUTIC DRUG MONITORING: ICD-10-CM

## 2022-09-26 DIAGNOSIS — R80.9 TYPE 2 DIABETES MELLITUS WITH MICROALBUMINURIA, WITHOUT LONG-TERM CURRENT USE OF INSULIN: Chronic | ICD-10-CM

## 2022-09-26 DIAGNOSIS — E55.9 VITAMIN D DEFICIENCY: Chronic | ICD-10-CM

## 2022-09-26 DIAGNOSIS — N40.1 BENIGN NON-NODULAR PROSTATIC HYPERPLASIA WITH LOWER URINARY TRACT SYMPTOMS: Chronic | ICD-10-CM

## 2022-09-26 DIAGNOSIS — Z01.00 DIABETIC EYE EXAM: ICD-10-CM

## 2022-09-26 DIAGNOSIS — R80.9 MICROALBUMINURIA: Chronic | ICD-10-CM

## 2022-09-26 DIAGNOSIS — E11.9 ENCOUNTER FOR DIABETIC FOOT EXAM: Chronic | ICD-10-CM

## 2022-09-26 DIAGNOSIS — Z91.199 MEDICAL NON-COMPLIANCE: Chronic | ICD-10-CM

## 2022-09-26 DIAGNOSIS — Z87.39 HISTORY OF GOUT: Chronic | ICD-10-CM

## 2022-09-26 DIAGNOSIS — E66.3 OVERWEIGHT (BMI 25.0-29.9): Chronic | ICD-10-CM

## 2022-09-26 DIAGNOSIS — F17.210 NICOTINE DEPENDENCE, CIGARETTES, UNCOMPLICATED: Chronic | ICD-10-CM

## 2022-09-26 DIAGNOSIS — D64.9 CHRONIC ANEMIA: Chronic | ICD-10-CM

## 2022-09-26 DIAGNOSIS — Z00.00 ROUTINE PHYSICAL EXAMINATION: Primary | ICD-10-CM

## 2022-09-26 DIAGNOSIS — Z90.49 HISTORY OF PARTIAL COLECTOMY: Chronic | ICD-10-CM

## 2022-09-26 DIAGNOSIS — E03.9 PRIMARY HYPOTHYROIDISM: Chronic | ICD-10-CM

## 2022-09-26 DIAGNOSIS — E11.29 TYPE 2 DIABETES MELLITUS WITH MICROALBUMINURIA, WITHOUT LONG-TERM CURRENT USE OF INSULIN: Chronic | ICD-10-CM

## 2022-09-26 DIAGNOSIS — N32.89 IRRITABLE BLADDER: Chronic | ICD-10-CM

## 2022-09-26 DIAGNOSIS — I10 BENIGN ESSENTIAL HYPERTENSION: Chronic | ICD-10-CM

## 2022-09-26 DIAGNOSIS — K52.9 CHRONIC DIARRHEA: Chronic | ICD-10-CM

## 2022-09-26 DIAGNOSIS — E78.2 MIXED HYPERLIPIDEMIA: Chronic | ICD-10-CM

## 2022-09-26 PROCEDURE — 99397 PER PM REEVAL EST PAT 65+ YR: CPT | Performed by: INTERNAL MEDICINE

## 2022-09-26 NOTE — PROGRESS NOTES
09/26/2022    Patient Information  Navarro Bruno                                                                                          [address]  [address]  [facility] [address]      1955  [unfilled]  There is no work phone number on file.    Chief Complaint:     Routine physical examination follow-up blood work.  No new acute complaints.    History of Present Illness:    Patient with type 2 diabetes, microalbuminuria, hyperlipidemia, hypertension, hypothyroidism, vitamin D deficiency, chronic anemia, gout, BPH, irritable bladder with nocturia, chronic diarrhea, history of partial colectomy for stricture, nicotine dependence from cigarettes, overweight, history of noncompliance.  He presents today for his routine annual physical exam and follow-up blood work in order to monitor his chronic medical issues.  His past medical history reviewed and updated were necessary including health maintenance parameters.  This reveals he is due another colonoscopy.  He also needs diabetic eye exam which I encouraged him to get.    Review of Systems   Constitutional: Negative.   HENT: Negative.    Eyes: Negative.    Cardiovascular: Negative.    Respiratory: Negative.    Endocrine: Negative.    Hematologic/Lymphatic: Negative.    Skin: Negative.    Musculoskeletal: Negative.    Gastrointestinal: Negative.    Genitourinary: Negative.    Neurological: Negative.    Psychiatric/Behavioral: Negative.    Allergic/Immunologic: Negative.        Active Problems:    Patient Active Problem List   Diagnosis   • Chronic anemia   • Benign essential hypertension   • Hyperlipidemia   • Primary hypothyroidism   • Irritable bladder   • Nocturia   • Type 2 diabetes mellitus with microalbuminuria, without long-term current use of insulin (HCC)   • Vitamin D deficiency   • Therapeutic drug monitoring   • Routine physical examination   • Chronic diarrhea   • History of partial colectomy, 3/6/2022--ileocolic resection for  stricture.  12/17/2014--ileo-cecal colectomy with primary reanastomosis.  Obstruction secondary to NSAID-induced inflamed ileum.   • Gout   • Benign prostatic hypertrophy   • Diabetic eye exam (HCC)   • Diabetic foot exam   • Nicotine dependence, cigarettes, uncomplicated   • Medical non-compliance   • Overweight (BMI 25.0-29.9)   • Microalbuminuria         Past Medical History:   Diagnosis Date   • Benign essential hypertension 04/19/2007 04/19/2007--treatment for hypertension begun.   • Benign prostatic hypertrophy 07/25/2016 07/25/2016--patient reports irritable bladder symptoms have resolved and he stopped using the Myrbetriq.  05/11/2015--patient presents with a one-month history of urinary urgency, occasional urge incontinence without dysuria. Patient has nocturia 2-3 times per night. Myrbetric 50 mg, one half by mouth daily initiated.   • Chronic anemia 02/04/2015 07/19/2016--hemoglobin slightly low at 13.3.  Hematocrit normal at 42.0.  05/11/2015--patient seen in follow-up and had a recent colonoscopy which was negative. Serum iron studies were normal. Homocystine and methylmalonic acid were normal. No further workup. Recent CBC reveals a slightly low hemoglobin but a normal hematocrit.   02/04/2015--routine CBC reveals a low hemoglobin of 11.5, hematocrit   • Chronic diarrhea 04/18/2016 07/25/2016--patient seen in follow-up and reports his diarrhea has improved but not resolved.  He reports that he WelChol did not help but he only took a maximum of 2 pills per day.  04/18/2016--patient with a history of partial colectomy performed in 2014 performed for an inflamed terminal ileum.  Part of the terminal ileum in the cecum removed.  Since that time patient presents with chronic diar   • Diabetic eye exam (HCC) 02/03/2017 02/03/2017--patient reports he has not had a diabetic eye examination.  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  Order given.   • Diabetic foot  exam 02/03/2017 02/03/2017--diabetic foot examination reveals no ulcers and no pre-ulcerative calluses.  He was left great toe and second toe have been ambulated from a lawnmower accident as a teenager.  Sensation is intact.   • Gout 07/15/2010    07/15/2010--patient presented with complaints of left foot pain. Initial diagnosis of gout. Uric acid 8.3. Treatment begun with allopurinol.   • History of echocardiogram 12/14/2014 12/14/2014--echocardiogram reveals left ventricular chamber size is normal. Mild concentric left ventricular hypertrophy. Normal left ventricular systolic function with ejection fraction of 58%. Abnormal left ventricular diastolic function is observed. Left Atrium normal. Right ventricular cavity size is mildly enlarged. The right ventricular global systolic function is normal. Right atrium is nor   • History of Hepatitis, infectious Age 18    18 years of age--patient had infectious hepatitis. Exact type unknown.   • History of partial colectomy, 3/6/2022--ileocolic resection for stricture.  12/17/2014--ileo-cecal colectomy with primary reanastomosis.  Obstruction secondary to NSAID-induced inflamed ileum. 12/17/2014 March 6, 2022--robotic assisted laparoscopic converted to open ileocolic resection for small bowel stricture.  12/17/2014--patient presented with a small bowel obstruction secondary to inflamed terminal ileum. He underwent ileo-cecal colectomy with primary reanastomosis.   • History of small bowel obstruction 12/15/2014    01/19/2015--patient seen in follow-up and is doing well. No change in current medical regimen at the present time. Set up fasting lab and follow up. I explained to patient that we will need to monitor him for the development of vitamin B12 deficiency. He will certainly be at risk for this.   12/17/2014--patient presented with a small bowel obstruction secondary to inflamed terminal ileum. He under   • Hyperlipidemia 06/13/2007 06/13/2007--treatment  for hyperlipidemia begun.   • Hypothyroidism 05/08/2015 05/19/2017--TSH elevated at 5.33.  Free T4 and free T3 are normal.  It appears the patient truly has hypothyroidism.  Levothyroxine 50 µg per day initiated.  01/27/2016--repeat thyroid function tests normal.  11/11/2015--TSH elevated at 5.27. Free T4 low normal at 0.92. TPO antibodies less than 6. Repeat thyroid function tests ordered.  05/08/2015--TSH mildly elevated at 4.5. Free T4 low normal at   • Irritable bladder 05/11/2016 02/03/2017--patient reports that he is irritable bladder symptoms have returned but they seem to be intermittent.  He describes urgency and frequency.  However, he was diabetes is not under optimal control and could be playing a role.  His A1c is less than 8 at the present time.  In the past Myrbetriq seemed to be helpful and I will give patient samples to have at the time that he needs it.  He is   • Medical non-compliance 05/02/2019   • Microalbuminuria 08/03/2021    August 3, 2021--routine follow-up.  Patient is diabetic and his urine microalbumin/creatinine ratio returned elevated at 129.4.  May need low-dose ACE inhibitor.   • Nicotine dependence, cigarettes, uncomplicated 05/02/2019   • Nocturia 03/29/2016 02/03/2017--patient seen in follow-up and continues to have nocturia 2-3 times per night.  He has irritable bladder symptoms consisting of urgency and occasional incontinence has returned as well.  Myrbetriq samples given and patient can use that as needed.  07/25/2016--patient reports irritable bladder symptoms have resolved and he stopped using the Myrbetriq.  05/11/2015--patient presents with a   • Overweight (BMI 25.0-29.9) 08/03/2021   • Stress incontinence    • Type 2 diabetes mellitus with microalbuminuria, without long-term current use of insulin ([facility]) 03/09/2007 03/09/2007--patient presented with polyuria and polydipsia. Initial diagnosis of diabetes. Serum glucose 252, initial hemoglobin A1c 10.4.   • Type  2 diabetes mellitus without complication, without long-term current use of insulin ([facility]) 03/09/2007 03/09/2007--patient presented with polyuria and polydipsia. Initial diagnosis of diabetes. Serum glucose 252, initial hemoglobin A1c 10.4.   • Vitamin D deficiency 04/12/2016         Past Surgical History:   Procedure Laterality Date   • AMPUTATION FOOT / TOE  18 years old    18 years of age--left great toe and left second toe amputation from a lawnmower accident.   • APPENDECTOMY  11 years old    11 years of age.   • CATARACT EXTRACTION Bilateral 2013 2013--patient reports he had bilateral cataract surgery about 4 years ago.  Intraocular lenses placed.   • COLECTOMY PARTIAL / TOTAL  12/17/2014 12/17/2014--patient presented with a small bowel obstruction secondary to inflamed terminal ileum. He underwent ileo-cecal colectomy with primary reanastomosis.   • COLECTOMY PARTIAL / TOTAL N/A 03/06/2022 March 6, 2022--robotic assisted laparoscopic converted to open ileocolic resection for small bowel stricture.   • COLON RESECTION N/A 03/02/2022    Procedure: Robotic assisted laparoscopic to Open ileocolic resection with anastomosis;  Surgeon: Guille Cedeno MD;  Location: [facility];  Service: Robotics - [facility];  Laterality: N/A;   • COLONOSCOPY  04/06/2015 04/06/2015--colonoscopy revealed small internal hemorrhoids. Well healed ileocolic anastomosis. Fair prep.   • COLONOSCOPY N/A 08/17/2021 August 17, 2021--colonoscopy revealed the ileal surgical anastomosis contained a benign-appearing intrinsic moderate stenosis that was nontraversed.  Biopsied.  Nonbleeding internal hemorrhoids.  Grade 1.  Pathology revealed acute ileitis with ulcer, stricture formation and associated serositis proximal to the anastomosis.  Intact anastomosis with focal suture granuloma.  13 benign lymph nodes.   • EXPLORATORY LAPAROTOMY  12/17/2014 12/17/2014--patient presented with a small bowel obstruction  secondary to inflamed terminal ileum. He underwent ileo-cecal colectomy with primary reanastomosis.         No Known Allergies        Current Outpatient Medications:   •  allopurinol (ZYLOPRIM) 300 MG tablet, Take 1 p.o. daily for gout, Disp: 90 tablet, Rfl: 2  •  atorvastatin (LIPITOR) 80 MG tablet, TAKE 1 TABLET BY MOUTH EVERY DAY FOR CHOLESTEROL, Disp: 90 tablet, Rfl: 0  •  B-D ULTRAFINE III SHORT PEN 31G X 8 MM misc, Use with Victoza injections daily., Disp: , Rfl: 3  •  Bioflavonoid Products (SUPER C-500 PO), Take 500 mg by mouth Daily., Disp: , Rfl:   •  Calcium Citrate-Vitamin D (CALCIUM CITRATE + D3 MAXIMUM PO), Take 1 tablet by mouth Daily., Disp: , Rfl:   •  Janumet XR  MG tablet, TAKE 2 TABLETS BY MOUTH one time a day for diabetes, Disp: 60 tablet, Rfl: 0  •  levothyroxine (SYNTHROID, LEVOTHROID) 50 MCG tablet, TAKE 1 TABLET BY MOUTH EVERY DAY for low thyroid, Disp: 90 tablet, Rfl: 0  •  metoprolol tartrate (LOPRESSOR) 25 MG tablet, TAKE 1 TABLET BY MOUTH TWO TIMES A DAY FOR HIGH BLOOD PRESSURE, Disp: 60 tablet, Rfl: 3  •  Probiotic Product (PROBIOTIC PO), Take 1 tablet by mouth Daily., Disp: , Rfl:   •  Rybelsus 7 MG tablet, TAKE 1 TABLET BY MOUTH EVERY DAY, Disp: 90 tablet, Rfl: 0  •  sitaGLIPtin-metFORMIN (Janumet)  MG per tablet, Take 1 p.o. twice daily for diabetes.  Take at breakfast and supper, Disp: 60 tablet, Rfl: 11  •  vitamin D3 125 MCG (5000 UT) capsule capsule, Take 5,000 Units by mouth Daily., Disp: , Rfl:       Family History   Problem Relation Age of Onset   • Scoliosis Mother         Amyotrophic Lateral Sclerosis   • Stroke Father         Father had multiple strokes.   • Diabetes Father         Type 2   • Hypertension Father    • Diabetes Paternal Grandfather         Type 2   • Malig Hyperthermia Neg Hx          Social History     Socioeconomic History   • Marital status:    • Years of education: 14   Tobacco Use   • Smoking status: Current Every Day Smoker      "Packs/day: 1.00     Types: Cigarettes   • Smokeless tobacco: Never Used   • Tobacco comment: STARTED IN HIGH SCHOOL OFF AND ON, QUIT FOR 10 YEARS, STARTED AGAIN 9 YEARS AGO   Vaping Use   • Vaping Use: Never used   Substance and Sexual Activity   • Alcohol use: Not Currently   • Drug use: Not Currently     Types: Marijuana     Comment: Quit 20 years ago.    • Sexual activity: Defer         Vitals:    09/26/22 0728   BP: 136/80   Pulse: 67   Resp: 18   SpO2: 97%   Weight: 109 kg (240 lb 9.6 oz)   Height: 190.5 cm (75\")        Body mass index is 30.07 kg/m².      Physical Exam:    General: Alert and oriented x 3.  No acute distress.  Normal affect.  HEENT: Pupils equal, round, reactive to light; extraocular movements intact; sclerae nonicteric; pharynx, ear canals and TMs normal.  Neck: Without JVD, thyromegaly, bruit, or adenopathy.  Lungs: Clear to auscultation in all fields.  Heart: Regular rate and rhythm without murmur, rub, gallop, or click.  Abdomen: Soft, nontender, without hepatosplenomegaly or hernia.  Bowel sounds normal.  : Deferred.  Rectal: Deferred.  Extremities: Without clubbing, cyanosis, edema, or pulse deficit.  Neurologic: Intact without focal deficit.  Normal station and gait observed during ingress and egress from the examination room.  Skin: Without significant lesion.  Musculoskeletal: Unremarkable.    September 26, 2022--routine diabetic foot exam reveals no evidence of diabetic foot ulcer or preulcerative callus.  Left great toe was amputated as well as the second toe from previous accident.  Pulses and sensation are intact.    Lab/other results:    CBC normal except white count mildly elevated at 11.51.  Platelets and differential are normal.  CPK normal at 80.  CMP normal except glucose 165.  Hemoglobin A1c 7.4.  Total cholesterol 124, triglyceride 122, LDL particle #564, HDL particle #32.3.  Vitamin D normal at 37.8.  Urinalysis normal.  Thyroid function test normal.  PSA normal at 1.08. "  Megaloblastic anemia panel is pending.    Assessment/Plan:     Diagnosis Plan   1. Routine physical examination     2. Type 2 diabetes mellitus with microalbuminuria, without long-term current use of insulin ([facility])  Comprehensive Metabolic Panel    Hemoglobin A1c    Microalbumin / Creatinine Urine Ratio - Urine, Clean Catch   3. Microalbuminuria  Microalbumin / Creatinine Urine Ratio - Urine, Clean Catch   4. Hyperlipidemia  CK    Comprehensive Metabolic Panel    NMR LipoProfile   5. Benign essential hypertension  Comprehensive Metabolic Panel   6. Primary hypothyroidism  TSH    T4, Free    T3, Free   7. Vitamin D deficiency  Vitamin D 25 Hydroxy   8. Chronic anemia  CBC (No Diff)   9. Gout  Uric Acid   10. Benign prostatic hypertrophy     11. Irritable bladder     12. Nocturia     13. Chronic diarrhea     14. History of partial colectomy, 3/6/2022--ileocolic resection for stricture.  12/17/2014--ileo-cecal colectomy with primary reanastomosis.  Obstruction secondary to NSAID-induced inflamed ileum.  Ambulatory Referral For Screening Colonoscopy   15. Diabetic foot exam     16. Nicotine dependence, cigarettes, uncomplicated     17. Overweight (BMI 25.0-29.9)     18. Medical non-compliance     19. Therapeutic drug monitoring     20. Diabetic eye exam ([facility])  Ambulatory Referral to Ophthalmology     Patient presents with essentially normal annual physical except for the following issues: He has type 2 diabetes is under fairly good control.  Microalbuminuria has been stable.  Hyperlipidemia is under excellent control.  His blood pressure is well controlled.  His thyroid is therapeutic on the current regimen.  Vitamin D is normal with supplementation.  Chronic anemia appears to have resolved.  His gout has been stable with allopurinol.  Irritable bladder and nocturia is currently not bad and not requiring medication.  Chronic diarrhea has resolved.  It tends to be intermittent.  He had a history of partial colectomy  recently for a stricture.  He may be due for another colonoscopy.  Patient continues smoke cigarettes and is not ready to commit to smoking cessation.  He is overweight and I strongly recommended low carbohydrate diet, exercise, and attainment of ideal body weight.    Several preventative health issues discussed including review of vaccinations and recommendations, including dietary issues, exercise and weight loss.  Safe sex practices discussed.  Patient advised to wear seatbelt whenever driving and avoid texting and driving.  Also advised to look both ways before crossing the street.  Patient may be due for colonoscopy.  Needs to check with the general surgeon advised to avoid tobacco products and minimize alcohol consumption.    Plan is as follows: Recommend diet, weight loss, and exercise as noted.  No changes in current medical regimen.  Patient will follow-up in 6 months with lab prior or follow-up as needed.  Patient needs to check with the general surgeon regarding possible colonoscopy.  Recommend diabetic eye exam      Procedures

## 2022-10-03 ENCOUNTER — TELEPHONE (OUTPATIENT)
Dept: INTERNAL MEDICINE | Facility: CLINIC | Age: 67
End: 2022-10-03

## 2022-10-03 NOTE — TELEPHONE ENCOUNTER
Caller: Navarro Bruno    Relationship: Self    Best call back number: [phone]     What is the best time to reach you: ANYTIME    Who are you requesting to speak with (clinical staff, provider,  specific staff member): CLINICAL    What was the call regarding: PATIENT CALLING WANTING TO KNOW IF  COULD WRITE HIM A DRS NOTE HE MISSED WORK ON 09/30/22 DUE TO ABDOMINAL PAIN HE STATE THEY ARE NEEDING A NOTE SO HE DOESN'T LOOSE HIS JOB     Do you require a callback: YES

## 2022-10-04 LAB
2ME-CITRATE SERPL-SCNC: 142 NMOL/L (ref 60–228)
CYSTATHIONIN SERPL-SCNC: 421 NMOL/L (ref 44–342)
HCYS SERPL-SCNC: 18.3 UMOL/L (ref 5.1–13.9)
Lab: ABNORMAL
METHYLMALONATE SERPL-SCNC: 117 NMOL/L (ref 0–378)

## 2022-10-06 ENCOUNTER — TELEPHONE (OUTPATIENT)
Dept: INTERNAL MEDICINE | Facility: CLINIC | Age: 67
End: 2022-10-06

## 2022-10-06 NOTE — TELEPHONE ENCOUNTER
Caller: Navarro Bruno    Relationship to patient: Self    Best call back number: [phone]    Patient is needing: PATIENT IS NEEDING A MEDICATION LIST MAILED TO HIS ADDRESS.      PATIENT IS ALSO NEEDING A CALL BACK ABOUT BEING SICK LAST WEEK AND NEEDS TO ASK THE DOCTOR ABOUT A WORK EXCUSE NOTE.    PATIENT CAN BE CONTACTED ANYTIME.

## 2022-11-19 DIAGNOSIS — Z87.39 HISTORY OF GOUT: Chronic | ICD-10-CM

## 2022-11-22 RX ORDER — ALLOPURINOL 300 MG/1
TABLET ORAL
Qty: 90 TABLET | Refills: 1 | Status: SHIPPED | OUTPATIENT
Start: 2022-11-22

## 2022-12-12 DIAGNOSIS — E03.9 HYPOTHYROIDISM, UNSPECIFIED TYPE: Chronic | ICD-10-CM

## 2022-12-12 RX ORDER — LEVOTHYROXINE SODIUM 0.05 MG/1
TABLET ORAL
Qty: 90 TABLET | Refills: 2 | Status: SHIPPED | OUTPATIENT
Start: 2022-12-12

## 2022-12-17 DIAGNOSIS — E11.9 TYPE 2 DIABETES MELLITUS WITHOUT COMPLICATION, WITHOUT LONG-TERM CURRENT USE OF INSULIN: Chronic | ICD-10-CM

## 2022-12-19 RX ORDER — ORAL SEMAGLUTIDE 7 MG/1
TABLET ORAL
Qty: 90 TABLET | Refills: 0 | Status: SHIPPED | OUTPATIENT
Start: 2022-12-19

## 2022-12-20 DIAGNOSIS — I10 BENIGN ESSENTIAL HYPERTENSION: Chronic | ICD-10-CM

## 2023-01-25 ENCOUNTER — TELEPHONE (OUTPATIENT)
Dept: INTERNAL MEDICINE | Facility: CLINIC | Age: 68
End: 2023-01-25

## 2023-01-25 NOTE — TELEPHONE ENCOUNTER
Caller: Navarro Bruno    Relationship: Self    Best call back number: [phone]    What was the call regarding: PATIENT NEEDS A PRIOR AUTH FOR HIS TWO MEDICATIONS, JANUMET AND RYBELSUS.     HE IS OUT OF MEDICATION.     Do you require a callback: YES, PLEASE ADVISE WHEN SENT.

## 2023-02-15 DIAGNOSIS — I10 BENIGN ESSENTIAL HYPERTENSION: Chronic | ICD-10-CM

## 2023-02-15 DIAGNOSIS — E78.2 MIXED HYPERLIPIDEMIA: ICD-10-CM

## 2023-02-15 RX ORDER — ATORVASTATIN CALCIUM 80 MG/1
TABLET, FILM COATED ORAL
Qty: 90 TABLET | Refills: 0 | Status: SHIPPED | OUTPATIENT
Start: 2023-02-15

## 2023-03-18 DIAGNOSIS — I10 BENIGN ESSENTIAL HYPERTENSION: Chronic | ICD-10-CM

## 2023-03-21 DIAGNOSIS — E55.9 VITAMIN D DEFICIENCY: Chronic | ICD-10-CM

## 2023-03-21 DIAGNOSIS — E11.29 TYPE 2 DIABETES MELLITUS WITH MICROALBUMINURIA, WITHOUT LONG-TERM CURRENT USE OF INSULIN: Chronic | ICD-10-CM

## 2023-03-21 DIAGNOSIS — R80.9 MICROALBUMINURIA: Chronic | ICD-10-CM

## 2023-03-21 DIAGNOSIS — I10 BENIGN ESSENTIAL HYPERTENSION: Chronic | ICD-10-CM

## 2023-03-21 DIAGNOSIS — D64.9 CHRONIC ANEMIA: Chronic | ICD-10-CM

## 2023-03-21 DIAGNOSIS — E78.2 MIXED HYPERLIPIDEMIA: Chronic | ICD-10-CM

## 2023-03-21 DIAGNOSIS — E03.9 PRIMARY HYPOTHYROIDISM: Chronic | ICD-10-CM

## 2023-03-21 DIAGNOSIS — R80.9 TYPE 2 DIABETES MELLITUS WITH MICROALBUMINURIA, WITHOUT LONG-TERM CURRENT USE OF INSULIN: Chronic | ICD-10-CM

## 2023-03-21 DIAGNOSIS — Z87.39 HISTORY OF GOUT: Chronic | ICD-10-CM

## 2023-03-23 LAB
25(OH)D3+25(OH)D2 SERPL-MCNC: 29.5 NG/ML (ref 30–100)
ALBUMIN SERPL-MCNC: 4.7 G/DL (ref 3.5–5.2)
ALBUMIN/CREAT UR: 84 MG/G CREAT (ref 0–29)
ALBUMIN/GLOB SERPL: 2.4 G/DL
ALP SERPL-CCNC: 100 U/L (ref 39–117)
ALT SERPL-CCNC: 6 U/L (ref 1–41)
AST SERPL-CCNC: 16 U/L (ref 1–40)
BILIRUB SERPL-MCNC: <0.2 MG/DL (ref 0–1.2)
BUN SERPL-MCNC: 17 MG/DL (ref 8–23)
BUN/CREAT SERPL: 16 (ref 7–25)
CALCIUM SERPL-MCNC: 10 MG/DL (ref 8.6–10.5)
CHLORIDE SERPL-SCNC: 102 MMOL/L (ref 98–107)
CHOLEST SERPL-MCNC: 110 MG/DL (ref 100–199)
CK SERPL-CCNC: 110 U/L (ref 20–200)
CO2 SERPL-SCNC: 20.4 MMOL/L (ref 22–29)
CREAT SERPL-MCNC: 1.06 MG/DL (ref 0.76–1.27)
CREAT UR-MCNC: 209.8 MG/DL
EGFRCR SERPLBLD CKD-EPI 2021: 76.4 ML/MIN/1.73
ERYTHROCYTE [DISTWIDTH] IN BLOOD BY AUTOMATED COUNT: 12.8 % (ref 12.3–15.4)
GLOBULIN SER CALC-MCNC: 2 GM/DL
GLUCOSE SERPL-MCNC: 155 MG/DL (ref 65–99)
HBA1C MFR BLD: 7.9 % (ref 4.8–5.6)
HCT VFR BLD AUTO: 43.5 % (ref 37.5–51)
HDL SERPL-SCNC: 30.3 UMOL/L
HDLC SERPL-MCNC: 43 MG/DL
HGB BLD-MCNC: 14.7 G/DL (ref 13–17.7)
LDL SERPL QN: 21.1 NM
LDL SERPL-SCNC: 492 NMOL/L
LDL SMALL SERPL-SCNC: 268 NMOL/L
LDLC SERPL CALC-MCNC: 45 MG/DL (ref 0–99)
MCH RBC QN AUTO: 29.9 PG (ref 26.6–33)
MCHC RBC AUTO-ENTMCNC: 33.8 G/DL (ref 31.5–35.7)
MCV RBC AUTO: 88.6 FL (ref 79–97)
MICROALBUMIN UR-MCNC: 175.2 UG/ML
PLATELET # BLD AUTO: 240 10*3/MM3 (ref 140–450)
POTASSIUM SERPL-SCNC: 4.7 MMOL/L (ref 3.5–5.2)
PROT SERPL-MCNC: 6.7 G/DL (ref 6–8.5)
RBC # BLD AUTO: 4.91 10*6/MM3 (ref 4.14–5.8)
SODIUM SERPL-SCNC: 142 MMOL/L (ref 136–145)
T3FREE SERPL-MCNC: 2.6 PG/ML (ref 2–4.4)
T4 FREE SERPL-MCNC: 1.24 NG/DL (ref 0.93–1.7)
TRIGL SERPL-MCNC: 120 MG/DL (ref 0–149)
TSH SERPL DL<=0.005 MIU/L-ACNC: 1.61 UIU/ML (ref 0.27–4.2)
URATE SERPL-MCNC: 6.5 MG/DL (ref 3.4–7)
WBC # BLD AUTO: 10.15 10*3/MM3 (ref 3.4–10.8)

## 2023-03-27 ENCOUNTER — OFFICE VISIT (OUTPATIENT)
Dept: INTERNAL MEDICINE | Facility: CLINIC | Age: 68
End: 2023-03-27
Payer: MEDICARE

## 2023-03-27 VITALS
WEIGHT: 238 LBS | BODY MASS INDEX: 29.59 KG/M2 | HEART RATE: 65 BPM | SYSTOLIC BLOOD PRESSURE: 134 MMHG | OXYGEN SATURATION: 93 % | DIASTOLIC BLOOD PRESSURE: 48 MMHG | HEIGHT: 75 IN

## 2023-03-27 DIAGNOSIS — Z90.49 HISTORY OF PARTIAL COLECTOMY: Chronic | ICD-10-CM

## 2023-03-27 DIAGNOSIS — I10 BENIGN ESSENTIAL HYPERTENSION: Chronic | ICD-10-CM

## 2023-03-27 DIAGNOSIS — R80.9 MICROALBUMINURIA: Chronic | ICD-10-CM

## 2023-03-27 DIAGNOSIS — N32.89 IRRITABLE BLADDER: Chronic | ICD-10-CM

## 2023-03-27 DIAGNOSIS — E11.29 TYPE 2 DIABETES MELLITUS WITH MICROALBUMINURIA, WITHOUT LONG-TERM CURRENT USE OF INSULIN: Primary | Chronic | ICD-10-CM

## 2023-03-27 DIAGNOSIS — E78.2 MIXED HYPERLIPIDEMIA: Chronic | ICD-10-CM

## 2023-03-27 DIAGNOSIS — R80.9 TYPE 2 DIABETES MELLITUS WITH MICROALBUMINURIA, WITHOUT LONG-TERM CURRENT USE OF INSULIN: Primary | Chronic | ICD-10-CM

## 2023-03-27 DIAGNOSIS — Z87.39 HISTORY OF GOUT: Chronic | ICD-10-CM

## 2023-03-27 DIAGNOSIS — R35.1 NOCTURIA: Chronic | ICD-10-CM

## 2023-03-27 DIAGNOSIS — N40.1 BENIGN NON-NODULAR PROSTATIC HYPERPLASIA WITH LOWER URINARY TRACT SYMPTOMS: Chronic | ICD-10-CM

## 2023-03-27 DIAGNOSIS — Z00.00 ROUTINE PHYSICAL EXAMINATION: ICD-10-CM

## 2023-03-27 DIAGNOSIS — E55.9 VITAMIN D DEFICIENCY: Chronic | ICD-10-CM

## 2023-03-27 DIAGNOSIS — D64.9 CHRONIC ANEMIA: Chronic | ICD-10-CM

## 2023-03-27 DIAGNOSIS — E03.9 PRIMARY HYPOTHYROIDISM: Chronic | ICD-10-CM

## 2023-03-27 DIAGNOSIS — K52.9 CHRONIC DIARRHEA: Chronic | ICD-10-CM

## 2023-03-27 DIAGNOSIS — Z51.81 THERAPEUTIC DRUG MONITORING: ICD-10-CM

## 2023-03-27 DIAGNOSIS — E66.3 OVERWEIGHT (BMI 25.0-29.9): Chronic | ICD-10-CM

## 2023-03-27 NOTE — PROGRESS NOTES
03/27/2023    Patient Information  Navarro Bruno                                                                                          [address]  [place] [address]      1955  [unfilled]  There is no work phone number on file.    Chief Complaint:     Follow-up medical problems as noted below.  No new acute complaints.    History of Present Illness:    Patient with multiple chronic medical problems as outlined below in the assessment and plan presents today for follow-up with lab prior in order to monitor these issues.  His past medical history reviewed and updated were necessary including health maintenance parameters.  This reveals he is currently up-to-date or else accounted for.    Review of Systems   Constitutional: Negative.   HENT: Negative.    Eyes: Negative.    Cardiovascular: Negative.    Respiratory: Negative.    Endocrine: Negative.    Hematologic/Lymphatic: Negative.    Skin: Negative.    Musculoskeletal: Negative.    Gastrointestinal: Negative.    Genitourinary: Negative.    Neurological: Negative.    Psychiatric/Behavioral: Negative.    Allergic/Immunologic: Negative.        Active Problems:    Patient Active Problem List   Diagnosis   • Chronic anemia   • Benign essential hypertension   • Hyperlipidemia   • Primary hypothyroidism   • Irritable bladder   • Nocturia   • Type 2 diabetes mellitus with microalbuminuria, without long-term current use of insulin ([facility])   • Vitamin D deficiency   • Therapeutic drug monitoring   • Routine physical examination   • Chronic diarrhea   • History of partial colectomy, 3/6/2022--ileocolic resection for stricture.  12/17/2014--ileo-cecal colectomy with primary reanastomosis.  Obstruction secondary to NSAID-induced inflamed ileum.   • Gout   • Benign prostatic hypertrophy   • Diabetic eye exam (HCC)   • Diabetic foot exam   • Nicotine dependence, cigarettes, uncomplicated   • Medical non-compliance   • Overweight (BMI 25.0-29.9)   •  Microalbuminuria         Past Medical History:   Diagnosis Date   • Benign essential hypertension 04/19/2007 04/19/2007--treatment for hypertension begun.   • Benign prostatic hypertrophy 07/25/2016 07/25/2016--patient reports irritable bladder symptoms have resolved and he stopped using the Myrbetriq.  05/11/2015--patient presents with a one-month history of urinary urgency, occasional urge incontinence without dysuria. Patient has nocturia 2-3 times per night. Myrbetric 50 mg, one half by mouth daily initiated.   • Chronic anemia 02/04/2015 07/19/2016--hemoglobin slightly low at 13.3.  Hematocrit normal at 42.0.  05/11/2015--patient seen in follow-up and had a recent colonoscopy which was negative. Serum iron studies were normal. Homocystine and methylmalonic acid were normal. No further workup. Recent CBC reveals a slightly low hemoglobin but a normal hematocrit.   02/04/2015--routine CBC reveals a low hemoglobin of 11.5, hematocrit   • Chronic diarrhea 04/18/2016 07/25/2016--patient seen in follow-up and reports his diarrhea has improved but not resolved.  He reports that he WelChol did not help but he only took a maximum of 2 pills per day.  04/18/2016--patient with a history of partial colectomy performed in 2014 performed for an inflamed terminal ileum.  Part of the terminal ileum in the cecum removed.  Since that time patient presents with chronic diar   • Diabetic eye exam (HCC) 02/03/2017 02/03/2017--patient reports he has not had a diabetic eye examination.  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  Order given.   • Diabetic foot exam 02/03/2017 02/03/2017--diabetic foot examination reveals no ulcers and no pre-ulcerative calluses.  He was left great toe and second toe have been ambulated from a lawnmower accident as a teenager.  Sensation is intact.   • Gout 07/15/2010    07/15/2010--patient presented with complaints of left foot pain. Initial diagnosis of  gout. Uric acid 8.3. Treatment begun with allopurinol.   • History of echocardiogram 12/14/2014 12/14/2014--echocardiogram reveals left ventricular chamber size is normal. Mild concentric left ventricular hypertrophy. Normal left ventricular systolic function with ejection fraction of 58%. Abnormal left ventricular diastolic function is observed. Left Atrium normal. Right ventricular cavity size is mildly enlarged. The right ventricular global systolic function is normal. Right atrium is nor   • History of Hepatitis, infectious Age 18    18 years of age--patient had infectious hepatitis. Exact type unknown.   • History of partial colectomy, 3/6/2022--ileocolic resection for stricture.  12/17/2014--ileo-cecal colectomy with primary reanastomosis.  Obstruction secondary to NSAID-induced inflamed ileum. 12/17/2014 March 6, 2022--robotic assisted laparoscopic converted to open ileocolic resection for small bowel stricture.  12/17/2014--patient presented with a small bowel obstruction secondary to inflamed terminal ileum. He underwent ileo-cecal colectomy with primary reanastomosis.   • History of small bowel obstruction 12/15/2014    01/19/2015--patient seen in follow-up and is doing well. No change in current medical regimen at the present time. Set up fasting lab and follow up. I explained to patient that we will need to monitor him for the development of vitamin B12 deficiency. He will certainly be at risk for this.   12/17/2014--patient presented with a small bowel obstruction secondary to inflamed terminal ileum. He under   • Hyperlipidemia 06/13/2007 06/13/2007--treatment for hyperlipidemia begun.   • Hypothyroidism 05/08/2015 05/19/2017--TSH elevated at 5.33.  Free T4 and free T3 are normal.  It appears the patient truly has hypothyroidism.  Levothyroxine 50 µg per day initiated.  01/27/2016--repeat thyroid function tests normal.  11/11/2015--TSH elevated at 5.27. Free T4 low normal at 0.92. TPO  antibodies less than 6. Repeat thyroid function tests ordered.  05/08/2015--TSH mildly elevated at 4.5. Free T4 low normal at   • Irritable bladder 05/11/2016 02/03/2017--patient reports that he is irritable bladder symptoms have returned but they seem to be intermittent.  He describes urgency and frequency.  However, he was diabetes is not under optimal control and could be playing a role.  His A1c is less than 8 at the present time.  In the past Myrbetriq seemed to be helpful and I will give patient samples to have at the time that he needs it.  He is   • Medical non-compliance 05/02/2019   • Microalbuminuria 08/03/2021    August 3, 2021--routine follow-up.  Patient is diabetic and his urine microalbumin/creatinine ratio returned elevated at 129.4.  May need low-dose ACE inhibitor.   • Nicotine dependence, cigarettes, uncomplicated 05/02/2019   • Nocturia 03/29/2016 02/03/2017--patient seen in follow-up and continues to have nocturia 2-3 times per night.  He has irritable bladder symptoms consisting of urgency and occasional incontinence has returned as well.  Myrbetriq samples given and patient can use that as needed.  07/25/2016--patient reports irritable bladder symptoms have resolved and he stopped using the Myrbetriq.  05/11/2015--patient presents with a   • Overweight (BMI 25.0-29.9) 08/03/2021   • Stress incontinence    • Type 2 diabetes mellitus with microalbuminuria, without long-term current use of insulin ([facility]) 03/09/2007 03/09/2007--patient presented with polyuria and polydipsia. Initial diagnosis of diabetes. Serum glucose 252, initial hemoglobin A1c 10.4.   • Type 2 diabetes mellitus without complication, without long-term current use of insulin ([facility]) 03/09/2007 03/09/2007--patient presented with polyuria and polydipsia. Initial diagnosis of diabetes. Serum glucose 252, initial hemoglobin A1c 10.4.   • Vitamin D deficiency 04/12/2016         Past Surgical History:   Procedure Laterality  Date   • AMPUTATION FOOT / TOE  18 years old    18 years of age--left great toe and left second toe amputation from a lawnmower accident.   • APPENDECTOMY  11 years old    11 years of age.   • CATARACT EXTRACTION Bilateral 2013 2013--patient reports he had bilateral cataract surgery about 4 years ago.  Intraocular lenses placed.   • COLECTOMY PARTIAL / TOTAL  12/17/2014 12/17/2014--patient presented with a small bowel obstruction secondary to inflamed terminal ileum. He underwent ileo-cecal colectomy with primary reanastomosis.   • COLECTOMY PARTIAL / TOTAL N/A 03/06/2022 March 6, 2022--robotic assisted laparoscopic converted to open ileocolic resection for small bowel stricture.   • COLON RESECTION N/A 03/02/2022    Procedure: Robotic assisted laparoscopic to Open ileocolic resection with anastomosis;  Surgeon: Guille Cedeno MD;  Location: [facility];  Service: Robotics - [facility];  Laterality: N/A;   • COLONOSCOPY  04/06/2015 04/06/2015--colonoscopy revealed small internal hemorrhoids. Well healed ileocolic anastomosis. Fair prep.   • COLONOSCOPY N/A 08/17/2021 August 17, 2021--colonoscopy revealed the ileal surgical anastomosis contained a benign-appearing intrinsic moderate stenosis that was nontraversed.  Biopsied.  Nonbleeding internal hemorrhoids.  Grade 1.  Pathology revealed acute ileitis with ulcer, stricture formation and associated serositis proximal to the anastomosis.  Intact anastomosis with focal suture granuloma.  13 benign lymph nodes.   • EXPLORATORY LAPAROTOMY  12/17/2014 12/17/2014--patient presented with a small bowel obstruction secondary to inflamed terminal ileum. He underwent ileo-cecal colectomy with primary reanastomosis.         No Known Allergies        Current Outpatient Medications:   •  allopurinol (ZYLOPRIM) 300 MG tablet, TAKE 1 TABLET BY MOUTH EVERY DAY, Disp: 90 tablet, Rfl: 1  •  atorvastatin (LIPITOR) 80 MG tablet, TAKE 1 TABLET BY MOUTH EVERY  DAY FOR CHOLESTEROL, Disp: 90 tablet, Rfl: 0  •  B-D ULTRAFINE III SHORT PEN 31G X 8 MM misc, Use with Victoza injections daily., Disp: , Rfl: 3  •  Bioflavonoid Products (SUPER C-500 PO), Take 500 mg by mouth Daily., Disp: , Rfl:   •  Calcium Citrate-Vitamin D (CALCIUM CITRATE + D3 MAXIMUM PO), Take 1 tablet by mouth Daily., Disp: , Rfl:   •  Janumet XR  MG tablet, TAKE 2 TABLETS BY MOUTH one time a day for diabetes, Disp: 60 tablet, Rfl: 0  •  levothyroxine (SYNTHROID, LEVOTHROID) 50 MCG tablet, TAKE 1 TABLET BY MOUTH EVERY DAY FOR LOW THYROID, Disp: 90 tablet, Rfl: 2  •  metoprolol tartrate (LOPRESSOR) 25 MG tablet, TAKE 1 TABLET BY MOUTH 2 TIMES A DAY FOR HIGH BLOOD PRESSURE, Disp: 60 tablet, Rfl: 0  •  Probiotic Product (PROBIOTIC PO), Take 1 tablet by mouth Daily., Disp: , Rfl:   •  Rybelsus 7 MG tablet, TAKE 1 TABLET BY MOUTH EVERY DAY, Disp: 90 tablet, Rfl: 0  •  sitaGLIPtin-metFORMIN (Janumet)  MG per tablet, Take 1 p.o. twice daily for diabetes.  Take at breakfast and supper, Disp: 60 tablet, Rfl: 11  •  vitamin D3 125 MCG (5000 UT) capsule capsule, Take 1 capsule by mouth Daily., Disp: , Rfl:       Family History   Problem Relation Age of Onset   • Scoliosis Mother         Amyotrophic Lateral Sclerosis   • Stroke Father         Father had multiple strokes.   • Diabetes Father         Type 2   • Hypertension Father    • Diabetes Paternal Grandfather         Type 2   • Malig Hyperthermia Neg Hx          Social History     Socioeconomic History   • Marital status:    • Years of education: 14   Tobacco Use   • Smoking status: Every Day     Packs/day: 1.00     Types: Cigarettes   • Smokeless tobacco: Never   • Tobacco comments:     STARTED IN HIGH SCHOOL OFF AND ON, QUIT FOR 10 YEARS, STARTED AGAIN 9 YEARS AGO   Vaping Use   • Vaping Use: Never used   Substance and Sexual Activity   • Alcohol use: Not Currently   • Drug use: Not Currently     Types: Marijuana     Comment: Quit 20 years  "ago.    • Sexual activity: Defer         Vitals:    03/27/23 0827   BP: 134/48   BP Location: Right arm   Patient Position: Sitting   Cuff Size: Adult   Pulse: 65   SpO2: 93%   Weight: 108 kg (238 lb)   Height: 190.5 cm (75\")        Body mass index is 29.75 kg/m².      Physical Exam:    General: Alert and oriented x 3.  No acute distress.  Overweight.  Normal affect.  HEENT: Pupils equal, round, reactive to light; extraocular movements intact; sclerae nonicteric; pharynx, ear canals and TMs normal.  Neck: Without JVD, thyromegaly, bruit, or adenopathy.  Lungs: Clear to auscultation in all fields.  Heart: Regular rate and rhythm without murmur, rub, gallop, or click.  Abdomen: Soft, nontender, without hepatosplenomegaly or hernia.  Bowel sounds normal.  : Deferred.  Rectal: Deferred.  Extremities: Without clubbing, cyanosis, edema, or pulse deficit.  Neurologic: Intact without focal deficit.  Normal station and gait observed during ingress and egress from the examination room.  Skin: Without significant lesion.  Musculoskeletal: Unremarkable.    Lab/other results:    CBC normal.  CPK normal at 110.  CMP normal except glucose 155.  Hemoglobin A1c 7.9.  Microalbumin/creatinine ratio elevated at 84.  Total cholesterol 110, triglycerides 120, LDL particle #492, HDL particle number little low at 30.3.  Thyroid function tests are normal.  Uric acid normal at 6.5.  Vitamin D is a little low at 29.5.    Assessment/Plan:     Diagnosis Plan   1. Type 2 diabetes mellitus with microalbuminuria, without long-term current use of insulin ([facility])  Comprehensive Metabolic Panel    Hemoglobin A1c    Microalbumin / Creatinine Urine Ratio - Urine, Clean Catch    Urinalysis With Microscopic If Indicated (No Culture) - Urine, Clean Catch      2. Microalbuminuria  Microalbumin / Creatinine Urine Ratio - Urine, Clean Catch      3. Hyperlipidemia  CK    Comprehensive Metabolic Panel    NMR LipoProfile      4. Primary hypothyroidism  TSH    " T4, Free    T3, Free      5. Gout  Uric Acid      6. Benign prostatic hypertrophy  PSA DIAGNOSTIC      7. Chronic anemia        8. Benign essential hypertension        9. Irritable bladder        10. Nocturia        11. Vitamin D deficiency  Vitamin D,25-Hydroxy      12. History of partial colectomy, 3/6/2022--ileocolic resection for stricture.  12/17/2014--ileo-cecal colectomy with primary reanastomosis.  Obstruction secondary to NSAID-induced inflamed ileum.        13. Chronic diarrhea        14. Overweight (BMI 25.0-29.9)        15. Therapeutic drug monitoring  CBC (No Diff)      16. Routine physical examination  Uric Acid        Patient has type 2 diabetes that is under fairly good control.  However, patient is overweight and I strongly encouraged low carbohydrate diet, exercise, and weight loss.  Microalbuminuria is mild and stable.  Hyperlipidemia is under good control on the current regimen.  His thyroid is therapeutic on levothyroxine 50 mcg/day.  Gout seems to be stable on allopurinol.  BPH currently not a big issue.  Patient does have some nocturia.  Vitamin D is a little bit low and I have encouraged him to be compliant with his vitamin D supplementation.  Chronic diarrhea is currently controlled.    Plan is as follows: I am not going to make any changes to his current medical regimen but I have stressed that he needs to work on weight loss and exercise.  Low carbohydrate diet would be the best thing for him.  Compliance with vitamin D.  Patient will follow-up on or after September 26, 2023 with lab prior for his annual physical.  Otherwise he will follow-up as needed.        Procedures

## 2023-05-01 ENCOUNTER — TELEPHONE (OUTPATIENT)
Dept: INTERNAL MEDICINE | Facility: CLINIC | Age: 68
End: 2023-05-01
Payer: MEDICARE

## 2023-05-01 DIAGNOSIS — E11.9 TYPE 2 DIABETES MELLITUS WITHOUT COMPLICATION, WITHOUT LONG-TERM CURRENT USE OF INSULIN: Chronic | ICD-10-CM

## 2023-05-01 RX ORDER — ORAL SEMAGLUTIDE 7 MG/1
1 TABLET ORAL DAILY
Qty: 90 TABLET | Refills: 0 | Status: CANCELLED | OUTPATIENT
Start: 2023-05-01

## 2023-05-01 NOTE — TELEPHONE ENCOUNTER
Caller: Navarro Bruno    Relationship: Self    Best call back number: [phone]    What form or medical record are you requesting: PRIOR AUTHORIZATION FOR Rybelsus 7 MG tablet    Who is requesting this form or medical record from you: INSURANCE    Timeframe paperwork needed: ASAP    Additional notes:   ALSO NEED TO KNOW IF A DECISION HAS BEEN MADE ABOUT JANUMET XR  ALSO WOULD LIKE SAMPLES FOR Rybelsus 7 MG tablet AS HE IS COMPLETELY OUT

## 2023-07-24 ENCOUNTER — TELEPHONE (OUTPATIENT)
Dept: INTERNAL MEDICINE | Facility: CLINIC | Age: 68
End: 2023-07-24
Payer: MEDICARE

## 2023-07-24 DIAGNOSIS — E11.29 TYPE 2 DIABETES MELLITUS WITH MICROALBUMINURIA, WITHOUT LONG-TERM CURRENT USE OF INSULIN: ICD-10-CM

## 2023-07-24 DIAGNOSIS — R80.9 TYPE 2 DIABETES MELLITUS WITH MICROALBUMINURIA, WITHOUT LONG-TERM CURRENT USE OF INSULIN: ICD-10-CM

## 2023-07-24 DIAGNOSIS — E11.9 TYPE 2 DIABETES MELLITUS WITHOUT COMPLICATION, WITHOUT LONG-TERM CURRENT USE OF INSULIN: Chronic | ICD-10-CM

## 2023-07-24 DIAGNOSIS — E11.9 TYPE 2 DIABETES MELLITUS WITHOUT COMPLICATION: ICD-10-CM

## 2023-07-24 RX ORDER — SITAGLIPTIN AND METFORMIN HYDROCHLORIDE 1000; 50 MG/1; MG/1
TABLET, FILM COATED, EXTENDED RELEASE ORAL
Qty: 60 TABLET | Refills: 4 | Status: SHIPPED | OUTPATIENT
Start: 2023-07-24

## 2023-07-24 RX ORDER — SITAGLIPTIN AND METFORMIN HYDROCHLORIDE 1000; 50 MG/1; MG/1
TABLET, FILM COATED ORAL
Qty: 28 TABLET | Refills: 0 | COMMUNITY
Start: 2023-07-24

## 2023-07-24 RX ORDER — ORAL SEMAGLUTIDE 7 MG/1
1 TABLET ORAL DAILY
Qty: 30 TABLET | Refills: 0 | COMMUNITY
Start: 2023-07-24 | End: 2023-07-24 | Stop reason: SDUPTHER

## 2023-07-24 RX ORDER — ORAL SEMAGLUTIDE 7 MG/1
1 TABLET ORAL DAILY
Qty: 30 TABLET | Refills: 4 | Status: SHIPPED | OUTPATIENT
Start: 2023-07-24

## 2023-07-24 NOTE — TELEPHONE ENCOUNTER
Caller: Navarro Bruno    Relationship: Self    Best call back number: [phone]       What was the call regarding: PATIENT STATES HE OS OUT OF THE JANUMENT AND REBELSUS MEDICATIONS AND HE WOULD LIKE TO KNOW IF DR BRANHAM HAS ANY SAMPLES    PLEASE CALL AND ADVISE

## 2023-08-21 DIAGNOSIS — I10 BENIGN ESSENTIAL HYPERTENSION: Chronic | ICD-10-CM

## 2023-08-23 DIAGNOSIS — I10 BENIGN ESSENTIAL HYPERTENSION: Chronic | ICD-10-CM

## 2023-09-05 ENCOUNTER — TELEPHONE (OUTPATIENT)
Dept: INTERNAL MEDICINE | Facility: CLINIC | Age: 68
End: 2023-09-05

## 2023-09-05 ENCOUNTER — OFFICE VISIT (OUTPATIENT)
Dept: INTERNAL MEDICINE | Facility: CLINIC | Age: 68
End: 2023-09-05
Payer: COMMERCIAL

## 2023-09-05 VITALS
SYSTOLIC BLOOD PRESSURE: 170 MMHG | WEIGHT: 229 LBS | DIASTOLIC BLOOD PRESSURE: 80 MMHG | RESPIRATION RATE: 16 BRPM | OXYGEN SATURATION: 99 % | HEART RATE: 65 BPM | BODY MASS INDEX: 28.47 KG/M2 | HEIGHT: 75 IN

## 2023-09-05 DIAGNOSIS — Z87.39 HISTORY OF GOUT: Chronic | ICD-10-CM

## 2023-09-05 DIAGNOSIS — E11.29 TYPE 2 DIABETES MELLITUS WITH MICROALBUMINURIA, WITHOUT LONG-TERM CURRENT USE OF INSULIN: Chronic | ICD-10-CM

## 2023-09-05 DIAGNOSIS — D64.9 CHRONIC ANEMIA: Chronic | ICD-10-CM

## 2023-09-05 DIAGNOSIS — Z00.00 ROUTINE PHYSICAL EXAMINATION: ICD-10-CM

## 2023-09-05 DIAGNOSIS — Z12.11 COLON CANCER SCREENING: ICD-10-CM

## 2023-09-05 DIAGNOSIS — Z90.49 HISTORY OF PARTIAL COLECTOMY: Chronic | ICD-10-CM

## 2023-09-05 DIAGNOSIS — R41.0 INTERMITTENT CONFUSION: Primary | ICD-10-CM

## 2023-09-05 DIAGNOSIS — R80.9 TYPE 2 DIABETES MELLITUS WITH MICROALBUMINURIA, WITHOUT LONG-TERM CURRENT USE OF INSULIN: Chronic | ICD-10-CM

## 2023-09-05 DIAGNOSIS — E11.9 TYPE 2 DIABETES MELLITUS WITHOUT COMPLICATION, WITHOUT LONG-TERM CURRENT USE OF INSULIN: Chronic | ICD-10-CM

## 2023-09-05 DIAGNOSIS — I10 BENIGN ESSENTIAL HYPERTENSION: Chronic | ICD-10-CM

## 2023-09-05 DIAGNOSIS — N40.1 BENIGN NON-NODULAR PROSTATIC HYPERPLASIA WITH LOWER URINARY TRACT SYMPTOMS: Chronic | ICD-10-CM

## 2023-09-05 DIAGNOSIS — E78.2 MIXED HYPERLIPIDEMIA: Chronic | ICD-10-CM

## 2023-09-05 DIAGNOSIS — Z51.81 THERAPEUTIC DRUG MONITORING: ICD-10-CM

## 2023-09-05 DIAGNOSIS — I10 UNCONTROLLED HYPERTENSION: ICD-10-CM

## 2023-09-05 DIAGNOSIS — F17.210 NICOTINE DEPENDENCE, CIGARETTES, UNCOMPLICATED: Chronic | ICD-10-CM

## 2023-09-05 DIAGNOSIS — E55.9 VITAMIN D DEFICIENCY: Chronic | ICD-10-CM

## 2023-09-05 DIAGNOSIS — E03.9 PRIMARY HYPOTHYROIDISM: Chronic | ICD-10-CM

## 2023-09-05 DIAGNOSIS — E11.9 TYPE 2 DIABETES MELLITUS WITHOUT COMPLICATION: ICD-10-CM

## 2023-09-05 DIAGNOSIS — R80.9 MICROALBUMINURIA: Chronic | ICD-10-CM

## 2023-09-05 RX ORDER — ORAL SEMAGLUTIDE 7 MG/1
1 TABLET ORAL DAILY
Qty: 30 TABLET | Refills: 4 | Status: SHIPPED | OUTPATIENT
Start: 2023-09-05

## 2023-09-05 RX ORDER — AMLODIPINE BESYLATE 2.5 MG/1
TABLET ORAL
Qty: 30 TABLET | Refills: 1 | Status: SHIPPED | OUTPATIENT
Start: 2023-09-05

## 2023-09-05 RX ORDER — SITAGLIPTIN AND METFORMIN HYDROCHLORIDE 1000; 50 MG/1; MG/1
TABLET, FILM COATED, EXTENDED RELEASE ORAL
Qty: 60 TABLET | Refills: 4 | Status: SHIPPED | OUTPATIENT
Start: 2023-09-05

## 2023-09-05 NOTE — TELEPHONE ENCOUNTER
Caller: Navarro Bruno    Relationship: Self    Best call back number: [phone]    What was the call regarding: PATIENT WOULD LIKE TO KNOW IF THERE ARE ANY SAMPLE OF JANKAYY AND REBTHIERNOUS.    HE ALSO WOULD LIKE TO KNOW THE NUMBER TO CONTACT TO GET HIS COLONOSCOPY SCHEDULED.    PLEASE CALL TO ADVISE.

## 2023-09-05 NOTE — PROGRESS NOTES
09/05/2023    Patient Information  Navarro Bruno                                                                                          [address]  [place] [address]      1955  [unfilled]  There is no work phone number on file.    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    History of Present Illness:    Patient with a history of multiple medical problems as noted below presents today with an episode of transient confusion.  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  He apparently just drove around and then ended up back home.  He remembers being lost but just could not find his destination.  He had no slurred speech, numbness or tingling or weakness on one side of his body.  No seizure activity.  He felt fine afterward.  He did not call his work regarding this until he got home and they are upset with the situation.  They want me to clear him or else put him on short-term disability.  Currently he is having no symptoms and had no further episodes.  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  Past medical history reviewed and updated were necessary including health maintenance parameters.  This reveals he is up-to-date or else accounted for currently with the exception of needing a colonoscopy.    Review of Systems   Constitutional: Negative.   HENT: Negative.     Eyes: Negative.    Cardiovascular: Negative.    Respiratory: Negative.     Endocrine: Negative.    Hematologic/Lymphatic: Negative.    Skin: Negative.    Musculoskeletal: Negative.    Gastrointestinal: Negative.    Genitourinary: Negative.    Neurological: Negative.    Psychiatric/Behavioral: Negative.     Allergic/Immunologic: Negative.      Active Problems:    Patient Active Problem List    Diagnosis    Chronic anemia    Benign essential hypertension    Hyperlipidemia    Primary hypothyroidism    Irritable bladder    Nocturia    Type 2 diabetes mellitus with microalbuminuria, without long-term current use of insulin    Vitamin D deficiency    Therapeutic drug monitoring    Routine physical examination    Chronic diarrhea    History of partial colectomy, 3/6/2022--ileocolic resection for stricture.  12/17/2014--ileo-cecal colectomy with primary reanastomosis.  Obstruction secondary to NSAID-induced inflamed ileum.    Gout    Benign prostatic hypertrophy    Diabetic eye exam    Diabetic foot exam    Nicotine dependence, cigarettes, uncomplicated    Medical non-compliance    Overweight (BMI 25.0-29.9)    Microalbuminuria    Intermittent confusion    Uncontrolled hypertension         Past Medical History:   Diagnosis Date    Benign essential hypertension 04/19/2007 04/19/2007--treatment for hypertension begun.    Benign prostatic hypertrophy 07/25/2016 07/25/2016--patient reports irritable bladder symptoms have resolved and he stopped using the Myrbetriq.  05/11/2015--patient presents with a one-month history of urinary urgency, occasional urge incontinence without dysuria. Patient has nocturia 2-3 times per night. Myrbetric 50 mg, one half by mouth daily initiated.    Chronic anemia 02/04/2015 07/19/2016--hemoglobin slightly low at 13.3.  Hematocrit normal at 42.0.  05/11/2015--patient seen in follow-up and had a recent colonoscopy which was negative. Serum iron studies were normal. Homocystine and methylmalonic acid were normal. No further workup. Recent CBC reveals a slightly low hemoglobin but a normal hematocrit.   02/04/2015--routine CBC reveals a low hemoglobin of 11.5, hematocrit    Chronic diarrhea 04/18/2016 07/25/2016--patient seen in follow-up and reports his diarrhea has improved but not resolved.  He reports that he WelChol did not help but he only took a maximum of 2 pills per  day.  04/18/2016--patient with a history of partial colectomy performed in 2014 performed for an inflamed terminal ileum.  Part of the terminal ileum in the cecum removed.  Since that time patient presents with chronic diar    Diabetic eye exam 02/03/2017 02/03/2017--patient reports he has not had a diabetic eye examination.  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  Order given.    Diabetic foot exam 02/03/2017 02/03/2017--diabetic foot examination reveals no ulcers and no pre-ulcerative calluses.  He was left great toe and second toe have been ambulated from a lawnmower accident as a teenager.  Sensation is intact.    Gout 07/15/2010    07/15/2010--patient presented with complaints of left foot pain. Initial diagnosis of gout. Uric acid 8.3. Treatment begun with allopurinol.    History of echocardiogram 12/14/2014 12/14/2014--echocardiogram reveals left ventricular chamber size is normal. Mild concentric left ventricular hypertrophy. Normal left ventricular systolic function with ejection fraction of 58%. Abnormal left ventricular diastolic function is observed. Left Atrium normal. Right ventricular cavity size is mildly enlarged. The right ventricular global systolic function is normal. Right atrium is nor    History of Hepatitis, infectious Age 18    18 years of age--patient had infectious hepatitis. Exact type unknown.    History of partial colectomy, 3/6/2022--ileocolic resection for stricture.  12/17/2014--ileo-cecal colectomy with primary reanastomosis.  Obstruction secondary to NSAID-induced inflamed ileum. 12/17/2014 March 6, 2022--robotic assisted laparoscopic converted to open ileocolic resection for small bowel stricture.  12/17/2014--patient presented with a small bowel obstruction secondary to inflamed terminal ileum. He underwent ileo-cecal colectomy with primary reanastomosis.    History of small bowel obstruction 12/15/2014    01/19/2015--patient seen in  follow-up and is doing well. No change in current medical regimen at the present time. Set up fasting lab and follow up. I explained to patient that we will need to monitor him for the development of vitamin B12 deficiency. He will certainly be at risk for this.   12/17/2014--patient presented with a small bowel obstruction secondary to inflamed terminal ileum. He under    Hyperlipidemia 06/13/2007 06/13/2007--treatment for hyperlipidemia begun.    Hypothyroidism 05/08/2015 05/19/2017--TSH elevated at 5.33.  Free T4 and free T3 are normal.  It appears the patient truly has hypothyroidism.  Levothyroxine 50 µg per day initiated.  01/27/2016--repeat thyroid function tests normal.  11/11/2015--TSH elevated at 5.27. Free T4 low normal at 0.92. TPO antibodies less than 6. Repeat thyroid function tests ordered.  05/08/2015--TSH mildly elevated at 4.5. Free T4 low normal at    Irritable bladder 05/11/2016 02/03/2017--patient reports that he is irritable bladder symptoms have returned but they seem to be intermittent.  He describes urgency and frequency.  However, he was diabetes is not under optimal control and could be playing a role.  His A1c is less than 8 at the present time.  In the past Myrbetriq seemed to be helpful and I will give patient samples to have at the time that he needs it.  He is    Medical non-compliance 05/02/2019    Microalbuminuria 08/03/2021    August 3, 2021--routine follow-up.  Patient is diabetic and his urine microalbumin/creatinine ratio returned elevated at 129.4.  May need low-dose ACE inhibitor.    Nicotine dependence, cigarettes, uncomplicated 05/02/2019    Nocturia 03/29/2016 02/03/2017--patient seen in follow-up and continues to have nocturia 2-3 times per night.  He has irritable bladder symptoms consisting of urgency and occasional incontinence has returned as well.  Myrbetriq samples given and patient can use that as needed.  07/25/2016--patient reports irritable bladder  symptoms have resolved and he stopped using the Myrbetriq.  05/11/2015--patient presents with a    Overweight (BMI 25.0-29.9) 08/03/2021    Stress incontinence     Type 2 diabetes mellitus with microalbuminuria, without long-term current use of insulin 03/09/2007 03/09/2007--patient presented with polyuria and polydipsia. Initial diagnosis of diabetes. Serum glucose 252, initial hemoglobin A1c 10.4.    Type 2 diabetes mellitus without complication, without long-term current use of insulin 03/09/2007 03/09/2007--patient presented with polyuria and polydipsia. Initial diagnosis of diabetes. Serum glucose 252, initial hemoglobin A1c 10.4.    Vitamin D deficiency 04/12/2016         Past Surgical History:   Procedure Laterality Date    AMPUTATION FOOT / TOE  18 years old    18 years of age--left great toe and left second toe amputation from a lawnmower accident.    APPENDECTOMY  11 years old    11 years of age.    CATARACT EXTRACTION Bilateral 2013 2013--patient reports he had bilateral cataract surgery about 4 years ago.  Intraocular lenses placed.    COLECTOMY PARTIAL / TOTAL  12/17/2014 12/17/2014--patient presented with a small bowel obstruction secondary to inflamed terminal ileum. He underwent ileo-cecal colectomy with primary reanastomosis.    COLECTOMY PARTIAL / TOTAL N/A 03/06/2022 March 6, 2022--robotic assisted laparoscopic converted to open ileocolic resection for small bowel stricture.    COLON RESECTION N/A 03/02/2022    Procedure: Robotic assisted laparoscopic to Open ileocolic resection with anastomosis;  Surgeon: Guille Cedeno MD;  Location: [facility];  Service: Robotics - [facility];  Laterality: N/A;    COLONOSCOPY  04/06/2015 04/06/2015--colonoscopy revealed small internal hemorrhoids. Well healed ileocolic anastomosis. Fair prep.    COLONOSCOPY N/A 08/17/2021 August 17, 2021--colonoscopy revealed the ileal surgical anastomosis contained a benign-appearing intrinsic  moderate stenosis that was nontraversed.  Biopsied.  Nonbleeding internal hemorrhoids.  Grade 1.  Pathology revealed acute ileitis with ulcer, stricture formation and associated serositis proximal to the anastomosis.  Intact anastomosis with focal suture granuloma.  13 benign lymph nodes.    EXPLORATORY LAPAROTOMY  12/17/2014 12/17/2014--patient presented with a small bowel obstruction secondary to inflamed terminal ileum. He underwent ileo-cecal colectomy with primary reanastomosis.         No Known Allergies        Current Outpatient Medications:     allopurinol (ZYLOPRIM) 300 MG tablet, TAKE 1 TABLET BY MOUTH EVERY DAY, Disp: 90 tablet, Rfl: 1    atorvastatin (LIPITOR) 80 MG tablet, TAKE 1 TABLET BY MOUTH EVERY DAY FOR CHOLESTEROL, Disp: 90 tablet, Rfl: 0    B-D ULTRAFINE III SHORT PEN 31G X 8 MM misc, Use with Victoza injections daily., Disp: , Rfl: 3    Bioflavonoid Products (SUPER C-500 PO), Take 500 mg by mouth Daily., Disp: , Rfl:     Calcium Citrate-Vitamin D (CALCIUM CITRATE + D3 MAXIMUM PO), Take 1 tablet by mouth Daily., Disp: , Rfl:     levothyroxine (SYNTHROID, LEVOTHROID) 50 MCG tablet, TAKE 1 TABLET BY MOUTH EVERY DAY FOR LOW THYROID, Disp: 90 tablet, Rfl: 2    metoprolol tartrate (LOPRESSOR) 25 MG tablet, Take 1 p.o. twice daily for high blood pressure, Disp: 60 tablet, Rfl: 4    Probiotic Product (PROBIOTIC PO), Take 1 tablet by mouth Daily., Disp: , Rfl:     Semaglutide (Rybelsus) 7 MG tablet, Take 7 mg by mouth Daily., Disp: 30 tablet, Rfl: 4    SITagliptin-metFORMIN HCl ER (Janumet XR)  MG tablet, Take two tablets by mouth daily for diabetes., Disp: 60 tablet, Rfl: 4    vitamin D3 125 MCG (5000 UT) capsule capsule, Take 1 capsule by mouth Daily., Disp: , Rfl:     amLODIPine (NORVASC) 2.5 MG tablet, Take 1 p.o. every morning for high blood pressure, Disp: 30 tablet, Rfl: 1      Family History   Problem Relation Age of Onset    Scoliosis Mother         Amyotrophic Lateral Sclerosis     "Stroke Father         Father had multiple strokes.    Diabetes Father         Type 2    Hypertension Father     Diabetes Paternal Grandfather         Type 2    Malig Hyperthermia Neg Hx          Social History     Socioeconomic History    Marital status:     Years of education: 14   Tobacco Use    Smoking status: Every Day     Packs/day: 1.00     Types: Cigarettes    Smokeless tobacco: Never    Tobacco comments:     STARTED IN HIGH SCHOOL OFF AND ON, QUIT FOR 10 YEARS, STARTED AGAIN 9 YEARS AGO   Vaping Use    Vaping Use: Never used   Substance and Sexual Activity    Alcohol use: Not Currently    Drug use: Not Currently     Types: Marijuana     Comment: Quit 20 years ago.     Sexual activity: Defer         Vitals:    09/05/23 0802   BP: 170/80   BP Location: Right arm   Patient Position: Sitting   Cuff Size: Adult   Pulse: 65   Resp: 16   SpO2: 99%   Weight: 104 kg (229 lb)   Height: 190.5 cm (75\")        Body mass index is 28.62 kg/m².      Physical Exam:    General: Alert and oriented x 3.  No acute distress.  Normal affect.  HEENT: Pupils equal, round, reactive to light; extraocular movements intact; sclerae nonicteric; pharynx, ear canals and TMs normal.  Neck: Without JVD, thyromegaly, bruit, or adenopathy.  Lungs: Clear to auscultation in all fields.  Heart: Regular rate and rhythm without murmur, rub, gallop, or click.  Abdomen: Soft, nontender, without hepatosplenomegaly or hernia.  Bowel sounds normal.  : Deferred.  Rectal: Deferred.  Extremities: Without clubbing, cyanosis, edema, or pulse deficit.  Neurologic: Intact without focal deficit.  Normal station and gait observed during ingress and egress from the examination room.  Skin: Without significant lesion.  Musculoskeletal: Unremarkable.    Lab/other results:      Assessment/Plan:     Diagnosis Plan   1. Intermittent confusion  MRI brain w wo contrast    Ambulatory Referral to Neurology      2. Uncontrolled hypertension        3. Type 2 " diabetes mellitus with microalbuminuria, without long-term current use of insulin  Urinalysis With Microscopic If Indicated (No Culture) - Urine, Clean Catch    Microalbumin / Creatinine Urine Ratio - Urine, Clean Catch    Hemoglobin A1c    Comprehensive Metabolic Panel      4. Primary hypothyroidism  T3, Free    T4, Free    TSH      5. Microalbuminuria  Microalbumin / Creatinine Urine Ratio - Urine, Clean Catch      6. Hyperlipidemia  NMR LipoProfile    Comprehensive Metabolic Panel    CK      7. Gout  Uric Acid      8. Chronic anemia        9. Benign prostatic hypertrophy  PSA DIAGNOSTIC      10. Benign essential hypertension  amLODIPine (NORVASC) 2.5 MG tablet    metoprolol tartrate (LOPRESSOR) 25 MG tablet      11. Nicotine dependence, cigarettes, uncomplicated        12. Vitamin D deficiency  Vitamin D,25-Hydroxy      13. Routine physical examination  Uric Acid      14. Therapeutic drug monitoring  CBC (No Diff)      15. History of partial colectomy, 3/6/2022--ileocolic resection for stricture.  12/17/2014--ileo-cecal colectomy with primary reanastomosis.  Obstruction secondary to NSAID-induced inflamed ileum.  Ambulatory Referral For Screening Colonoscopy      16. Colon cancer screening  Ambulatory Referral For Screening Colonoscopy      17. Type 2 diabetes mellitus without complication, without long-term current use of insulin  Semaglutide (Rybelsus) 7 MG tablet      18. Type 2 diabetes mellitus without complication  SITagliptin-metFORMIN HCl ER (Janumet XR)  MG tablet        September 5, 2023--Patient with a history of multiple medical problems as noted below presents today with an episode of transient confusion.  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  He apparently just drove around and then ended up back home.  He remembers being lost but just could not find his destination.  He had no slurred speech,  numbness or tingling or weakness on one side of his body.  No seizure activity.  He felt fine afterward.  He did not call his work regarding this until he got home and they are upset with the situation.  They want me to clear him or else put him on short-term disability.  Currently he is having no symptoms and had no further episodes.  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  Past medical history reviewed and updated were necessary including health maintenance parameters.  This reveals he is up-to-date or else accounted for currently with the exception of needing a colonoscopy.  On exam today he has absolutely no focal deficit.  His blood pressure which I personally assessed were 170/80.  Right arm, sitting, adult cuff.  Assessment is episode of transient confusion.  Consider petit mall seizure versus TIA.  Blood pressure has been elevated and is elevated today plan is as follows: Start amlodipine 2.5 mg daily.  Patient will follow-up in about 3 to 4 weeks to reassess the blood pressure.  Neurology referral.  MRI of the brain.  Further to follow.     170/80 MRI of the brain ordered.  Start amlodipine 2.5 mg/day.  No other changes to his medications.  Refill medications were needed.  Check lab work today to assess his underlying problems.  Patient has an appointment already set up for wellness visit September 25, 2023.        Procedures

## 2023-09-07 ENCOUNTER — TELEPHONE (OUTPATIENT)
Dept: INTERNAL MEDICINE | Facility: CLINIC | Age: 68
End: 2023-09-07
Payer: MEDICARE

## 2023-09-07 LAB
25(OH)D3+25(OH)D2 SERPL-MCNC: 31.3 NG/ML (ref 30–100)
ALBUMIN SERPL-MCNC: 4 G/DL (ref 3.5–5.2)
ALBUMIN/CREAT UR: 78 MG/G CREAT (ref 0–29)
ALBUMIN/GLOB SERPL: 1.7 G/DL
ALP SERPL-CCNC: 92 U/L (ref 39–117)
ALT SERPL-CCNC: 8 U/L (ref 1–41)
APPEARANCE UR: CLEAR
AST SERPL-CCNC: 10 U/L (ref 1–40)
BACTERIA #/AREA URNS HPF: NORMAL /HPF
BILIRUB SERPL-MCNC: 0.3 MG/DL (ref 0–1.2)
BILIRUB UR QL STRIP: NEGATIVE
BUN SERPL-MCNC: 11 MG/DL (ref 8–23)
BUN/CREAT SERPL: 13.3 (ref 7–25)
CALCIUM SERPL-MCNC: 9.4 MG/DL (ref 8.6–10.5)
CASTS URNS MICRO: NORMAL
CHLORIDE SERPL-SCNC: 103 MMOL/L (ref 98–107)
CHOLEST SERPL-MCNC: 121 MG/DL (ref 100–199)
CK SERPL-CCNC: 59 U/L (ref 20–200)
CO2 SERPL-SCNC: 22.9 MMOL/L (ref 22–29)
COLOR UR: YELLOW
CREAT SERPL-MCNC: 0.83 MG/DL (ref 0.76–1.27)
CREAT UR-MCNC: 115.3 MG/DL
EGFRCR SERPLBLD CKD-EPI 2021: 94.7 ML/MIN/1.73
EPI CELLS #/AREA URNS HPF: NORMAL /HPF
ERYTHROCYTE [DISTWIDTH] IN BLOOD BY AUTOMATED COUNT: 12.9 % (ref 12.3–15.4)
GLOBULIN SER CALC-MCNC: 2.3 GM/DL
GLUCOSE SERPL-MCNC: 290 MG/DL (ref 65–99)
GLUCOSE UR QL STRIP: ABNORMAL
HBA1C MFR BLD: 7.3 % (ref 4.8–5.6)
HCT VFR BLD AUTO: 42.9 % (ref 37.5–51)
HDL SERPL-SCNC: 31.2 UMOL/L
HDLC SERPL-MCNC: 45 MG/DL
HGB BLD-MCNC: 13.9 G/DL (ref 13–17.7)
HGB UR QL STRIP: NEGATIVE
KETONES UR QL STRIP: NEGATIVE
LDL SERPL QN: 21 NM
LDL SERPL-SCNC: 641 NMOL/L
LDL SMALL SERPL-SCNC: 384 NMOL/L
LDLC SERPL CALC-MCNC: 46 MG/DL (ref 0–99)
LEUKOCYTE ESTERASE UR QL STRIP: NEGATIVE
MCH RBC QN AUTO: 29.3 PG (ref 26.6–33)
MCHC RBC AUTO-ENTMCNC: 32.4 G/DL (ref 31.5–35.7)
MCV RBC AUTO: 90.3 FL (ref 79–97)
MICROALBUMIN UR-MCNC: 90.2 UG/ML
NITRITE UR QL STRIP: NEGATIVE
PH UR STRIP: 6.5 [PH] (ref 5–8)
PLATELET # BLD AUTO: 175 10*3/MM3 (ref 140–450)
POTASSIUM SERPL-SCNC: 3.8 MMOL/L (ref 3.5–5.2)
PROT SERPL-MCNC: 6.3 G/DL (ref 6–8.5)
PROT UR QL STRIP: ABNORMAL
PSA SERPL-MCNC: 1.19 NG/ML (ref 0–4)
RBC # BLD AUTO: 4.75 10*6/MM3 (ref 4.14–5.8)
RBC #/AREA URNS HPF: NORMAL /HPF
SODIUM SERPL-SCNC: 142 MMOL/L (ref 136–145)
SP GR UR STRIP: 1.02 (ref 1–1.03)
T3FREE SERPL-MCNC: 2.3 PG/ML (ref 2–4.4)
T4 FREE SERPL-MCNC: 1.01 NG/DL (ref 0.93–1.7)
TRIGL SERPL-MCNC: 184 MG/DL (ref 0–149)
TSH SERPL DL<=0.005 MIU/L-ACNC: 3.43 UIU/ML (ref 0.27–4.2)
URATE SERPL-MCNC: 5.9 MG/DL (ref 3.4–7)
UROBILINOGEN UR STRIP-MCNC: ABNORMAL MG/DL
WBC # BLD AUTO: 8.42 10*3/MM3 (ref 3.4–10.8)
WBC #/AREA URNS HPF: NORMAL /HPF

## 2023-09-07 NOTE — TELEPHONE ENCOUNTER
Caller:     Relationship:     Best call back number: [phone]    What is the best time to reach you: ANYTIME     Who are you requesting to speak with (clinical staff, provider,  specific staff member): CLINICAL STAFF     Do you know the name of the person who called: SELF     What was the call regarding:PATIENT CALLED AND STATED TO PLEASE PLEASE FAX OVER THE DISABILITY PAPERS [phone]     Is it okay if the provider responds through MyChart: NO

## 2023-09-07 NOTE — TELEPHONE ENCOUNTER
Tried to call pt unable to leave vm. Need to know did he drop these off or where there forms are so Dr Chavira can take a look at them.

## 2023-09-12 ENCOUNTER — TELEPHONE (OUTPATIENT)
Dept: INTERNAL MEDICINE | Facility: CLINIC | Age: 68
End: 2023-09-12
Payer: MEDICARE

## 2023-09-12 NOTE — TELEPHONE ENCOUNTER
Caller: Navarro Bruno    Relationship: Self    Best call back number:[phone]     What is the best time to reach you: ANYTIME    Who are you requesting to speak with (clinical staff, provider,  specific staff member):CLINICAL    What was the call regarding: PATIENT CALLING TO FOLLOW UP ON THE DISABILITY PAPERWORK THAT HE HAD LEFT WITH SOMEONE AFTER HIS APPOINTMENT ON 09/05/23 HE WOULD LIKE TO  KNOW IF THIS HAS BEEN COMPLETED     Is it okay if the provider responds through MyChart:

## 2023-09-13 NOTE — TELEPHONE ENCOUNTER
PATIENT CALLING TO FOLLOW UP ON THIS HE STATED THAT HE HAS BEEN OFF WORK SINCE 08/24/23 HE IS TRYING TO GET PAID FOR THE TIME HE IS OFF WORK.

## 2023-09-14 NOTE — TELEPHONE ENCOUNTER
Hub can relay: Pt has been contacted with detailed VM.  The short term disability paper was emailed to [facility] at [email or web address] and the paperwork pertaining to DOT clearance was faxed back to [facility] at fax# [phone].  A copy is at  for pt to .

## 2023-09-14 NOTE — TELEPHONE ENCOUNTER
PATIENT STATES HE IS REQUESTING AN UPDATE ON HIS DISABILITY PAPERWORK.     HUB READ THE RELAY MESSAGE TO THE PATIENT.    PATIENT VERBALIZED HIS UNDERSTANDING.

## 2023-09-21 ENCOUNTER — TELEPHONE (OUTPATIENT)
Dept: INTERNAL MEDICINE | Facility: CLINIC | Age: 68
End: 2023-09-21

## 2023-09-21 NOTE — TELEPHONE ENCOUNTER
Caller: [facility] C[facility] SHORT TERM DISABILITY    Relationship: Payer    Best call back number:   TERM DISABILITY (Payer) [phone]       What form or medical record are you requesting: ATTENDING STATEMENT    Who is requesting this form or medical record from you: [facility] SHORT TERM DISABILITY INS      Timeframe paperwork needed: REQUEST A RETURN CALL ASAP

## 2023-09-21 NOTE — TELEPHONE ENCOUNTER
Caller: Truman Lacy    Relationship: Self    Best call back number: [phone]       Do you know the name of the person who called: TRUMAN LACY    What was the call regarding: PATIENT IS CALLING BACK TO CHECK THE STATUS OF A CALL BACK TO [facility] DISABILITY.  HE STATES HE HAS NOT RECEIVED ANY FORM OF PAYMENT FOR A MONTH.  HE IS WANTING TO KNOW IF DR BRANHAM WOULD BE WILLING TO CALL THE DISABILITY OFFICE BACK AND ASK FOR MOHAMUD AS SOON AS POSSIBLE.  THE PHONE NUMBER -[phone]     Is it okay if the provider responds through Partigit: NO.    PLEASE ADVISE.

## 2023-09-22 NOTE — TELEPHONE ENCOUNTER
I called [employer] and was told that the attending phys statement wasn't clear enough that pt was instructed to STOP working.  I clarified the first date pt was out of work and will send a letter stating that he is not to work until released by his physician.

## 2023-09-24 ENCOUNTER — HOSPITAL ENCOUNTER (OUTPATIENT)
Dept: MRI IMAGING | Facility: HOSPITAL | Age: 68
Discharge: HOME | End: 2023-09-24
Admitting: INTERNAL MEDICINE

## 2023-09-24 DIAGNOSIS — R41.0 INTERMITTENT CONFUSION: ICD-10-CM

## 2023-09-24 PROCEDURE — 0 GADOBENATE DIMEGLUMINE 529 MG/ML SOLUTION: Performed by: INTERNAL MEDICINE

## 2023-09-24 PROCEDURE — 70553 MRI BRAIN STEM W/O & W/DYE: CPT

## 2023-09-24 PROCEDURE — A9577 INJ MULTIHANCE: HCPCS | Performed by: INTERNAL MEDICINE

## 2023-09-24 RX ADMIN — GADOBENATE DIMEGLUMINE 20 ML: 529 INJECTION, SOLUTION INTRAVENOUS at 09:41

## 2023-09-25 ENCOUNTER — TELEPHONE (OUTPATIENT)
Dept: INTERNAL MEDICINE | Facility: CLINIC | Age: 68
End: 2023-09-25

## 2023-09-25 NOTE — TELEPHONE ENCOUNTER
Caller: Navarro Bruno    Relationship to patient: Self    Best call back number:     Patient is needing: PATIENT STATES THAT LAST WEEK DR BRANHAM WAS TO CALL HIS INSURANCE COMPANY ABOUT HIS SHORT TERM DISABILITY.  PATIENT WOULD LIKE A CALL BACK FROM THE OFFICE TO LET HIM KNOW IF HE DID FOLLOW UP WITH HIS INSURANCE COMPANY ABOUT THIS.       PATIENT STATES HE HAD THE MRI YESTERDAY AND THAT DR. BRANHAM SHOULD HAVE THE RESULTS TOMORROW.   HE STATES HE WILL SEE HIS NEUROLOGIST NEXT WEEK, BUT WOULD LIKE DR. BRANHAM TO ALSO CONTACT HIM WITH THE MRI RESULTS SO THAT HE DOES NOT HAVE TO WAIT UNTIL NEXT WEEK.

## 2023-09-28 ENCOUNTER — OFFICE VISIT (OUTPATIENT)
Dept: NEUROLOGY | Facility: CLINIC | Age: 68
End: 2023-09-28
Payer: COMMERCIAL

## 2023-09-28 ENCOUNTER — TELEPHONE (OUTPATIENT)
Dept: NEUROLOGY | Facility: CLINIC | Age: 68
End: 2023-09-28

## 2023-09-28 VITALS
BODY MASS INDEX: 27.6 KG/M2 | SYSTOLIC BLOOD PRESSURE: 138 MMHG | OXYGEN SATURATION: 98 % | HEART RATE: 51 BPM | HEIGHT: 75 IN | DIASTOLIC BLOOD PRESSURE: 86 MMHG | WEIGHT: 222 LBS

## 2023-09-28 DIAGNOSIS — R41.0 TRANSIENT CONFUSION: Primary | ICD-10-CM

## 2023-09-28 RX ORDER — ASPIRIN 81 MG/1
81 TABLET ORAL DAILY
Qty: 30 TABLET | Refills: 5
Start: 2023-09-28 | End: 2024-03-26

## 2023-09-28 NOTE — PROGRESS NOTES
[facility] NEUROLOGY         Date of Visit: 2023    Name: Navarro Bruno    :  1955    PCP: Carlos Chavira MD    Visit Type: an initial evaluation         Subjective     Patient ID: Navarro is a 68 y.o. male.         History of Present Illness  I have had the pleasure of seeing your patient for the first time today.  As you may know he is a 68-year-old male here today for initial evaluation for episode of intermittent confusion.  He was referred by his primary care doctor Dr. Chavira.    History:    Patient does have a history of type 2 diabetes mellitus, hypertension, hyperlipidemia.    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  This is a drive he has made 100s of times and is very familiar with.  He states however as he got closer to his destination he became confused stating he did not really know where he was or recognize the area.  He states he ended up making it to his drop.  Okay and ended up driving home without any difficulty.  However when he mentioned the episode to his employer they did recommend additional follow-up and work-up with his primary care physician and clearance prior to returning to driving.    Patient did see his primary care physician on 2023 for initial evaluation.  At that time his PCP did end up ordering an MRI of the brain.  He also found that the patient had not been taking a couple of his medications including his Janumet for his diabetes due to prescription cost as well as he was only taking him his metoprolol once daily and blood pressure was significantly elevated.  He did end up adjusting blood pressure medication and restarting patient on his diabetic medication.    Patient did end up having MRI of the brain.  MRI revealed:    MRI Brain With & Without Contrast    Result Date: 2023  MRI OF THE BRAIN WITH AND WITHOUT CONTRAST ON 2023  CLINICAL HISTORY: Patient presents  with transient episodes of intermittent confusion.  TECHNIQUE: Axial T1, FLAIR, fat-suppressed T2, axial diffusion and gradient echo T2, sagittal T1 and postcontrast axial fat-suppressed T1 and coronal T1-weighted images were obtained of the entire head.  There are no prior studies from [facility] for comparison.  FINDINGS: There is minimal T2 high signal in the periventricular white matter and there are scattered tiny patchy nodular foci in the frontoparietal subcortical white matter consistent with mild small vessel disease. There is a 5 mm old lacunar infarct in the body of the right caudate nucleus. The remainder of the brain parenchyma is normal in signal intensity. Specifically no diffusion-weighted abnormality is seen with no acute infarct identified. On the gradient echo T2 weighted images, no acute or old blood breakdown products are seen intracranially. The ventricles are normal in size. I see no focal mass effect. There is no midline shift. No extra-axial fluid collections are identified. No abnormal areas of enhancement are seen in the head. The paranasal sinuses and the mastoid air cells and middle ear cavities are clear. Good flow voids are demonstrated within the cerebral vessels and in the dural venous sinuses. The calvarium and skull base demonstrate normal marrow signal intensity. Bilateral intraocular lens implants are present within the globes from previous bilateral cataract surgery. 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      Impression:  1. No acute intracranial abnormality is identified. There is mild small vessel disease in the cerebral white matter and a 5 mm old lacunar infarct in the body of the right caudate nucleus. Bilateral intraocular lens implants are in place from previous bilateral cataract  surgery. There is a 6 x 5 x 5 mm ovoid T2 hyperintense T1 hypointense focus between the anterior and posterior lobes of the pituitary could be a small Rathke cleft cyst felt to be of no acute significance. The remainder of the MRI of the brain is normal. The etiology of this patient's transient episode of confusion is not established on this exam.  This report was finalized on 9/26/2023 10:12 AM by Dr. Philippe Bocanegra M.D.      Current:    Patient has not had any additional episodes of confusion, strokelike symptoms, or memory issues.  He denied any additional symptoms with the confusion such as numbness, tingling, weakness, speech issues, balance or gait disturbance.  His blood pressure is much better managed today at 138/86.  He denies history of seizures.  He denies complaints of memory loss.  No other new neurological complaints at today's visit.       The following portions of the patient's history were reviewed and updated as appropriate: allergies, current medications, past family history, past medical history, past social history, past surgical history, and problem list.                 Review of Systems   Constitutional:  Negative for activity change, appetite change, fatigue and unexpected weight change.   HENT:  Negative for hearing loss and trouble swallowing.    Eyes:  Negative for visual disturbance.   Respiratory:  Negative for chest tightness and shortness of breath.    Cardiovascular:  Negative for palpitations.   Gastrointestinal:  Negative for constipation, diarrhea, nausea and vomiting.   Genitourinary:  Negative for decreased urine volume, difficulty urinating and frequency.   Musculoskeletal:  Negative for gait problem.   Neurological:  Negative for tremors, syncope, facial asymmetry, speech difficulty, weakness and light-headedness.   Psychiatric/Behavioral:  Positive for confusion. Negative for agitation, behavioral problems, dysphoric mood, hallucinations and sleep disturbance. The patient is  "not nervous/anxious.           Current Medications:    Current Outpatient Medications   Medication Instructions    allopurinol (ZYLOPRIM) 300 MG tablet TAKE 1 TABLET BY MOUTH EVERY DAY    amLODIPine (NORVASC) 2.5 MG tablet Take 1 p.o. every morning for high blood pressure    aspirin 81 mg, Oral, Daily    atorvastatin (LIPITOR) 80 MG tablet TAKE 1 TABLET BY MOUTH EVERY DAY FOR CHOLESTEROL    B-D ULTRAFINE III SHORT PEN 31G X 8 MM misc Use with Victoza injections daily.    Bioflavonoid Products (SUPER C-500 PO) 500 mg, Oral, Daily    Calcium Citrate-Vitamin D (CALCIUM CITRATE + D3 MAXIMUM PO) 1 tablet, Oral, Daily    levothyroxine (SYNTHROID, LEVOTHROID) 50 MCG tablet TAKE 1 TABLET BY MOUTH EVERY DAY FOR LOW THYROID    metoprolol tartrate (LOPRESSOR) 25 MG tablet Take 1 p.o. twice daily for high blood pressure    Probiotic Product (PROBIOTIC PO) 1 tablet, Oral, Daily    Rybelsus 7 mg, Oral, Daily    SITagliptin-metFORMIN HCl ER (Janumet XR)  MG tablet Take two tablets by mouth daily for diabetes.    vitamin D3 5,000 Units, Oral, Daily          /86   Pulse 51   Ht 190.5 cm (75\")   Wt 101 kg (222 lb)   SpO2 98%   BMI 27.75 kg/m²                Objective     Neurological Exam  Mental Status  Awake, alert and oriented to person, place and time. Recent and remote memory are intact. Speech is normal. Language is fluent with no aphasia. Attention and concentration are normal.    Cranial Nerves  CN II: Visual fields full to confrontation.  CN III, IV, VI: Extraocular movements intact bilaterally. Normal lids and orbits bilaterally. Pupils equal round and reactive to light bilaterally.  CN V: Facial sensation is normal.  CN VII: Full and symmetric facial movement.  CN IX, X: Palate elevates symmetrically  CN XI: Shoulder shrug strength is normal.  CN XII: Tongue midline without atrophy or fasciculations.    Motor  Normal muscle bulk throughout. Normal muscle tone. No abnormal involuntary movements. Strength " is 5/5 throughout all four extremities.    Sensory  Sensation is intact to light touch, pinprick, vibration and proprioception in all four extremities.    Reflexes  Deep tendon reflexes are 2+ and symmetric in all four extremities.    Coordination    Finger-to-nose, rapid alternating movements and heel-to-shin normal bilaterally without dysmetria.    Gait  Normal casual, toe, heel and tandem gait.    Physical Exam  Constitutional:       Appearance: Normal appearance. He is normal weight.   HENT:      Head: Normocephalic.   Eyes:      General: Lids are normal.      Extraocular Movements: Extraocular movements intact.      Pupils: Pupils are equal, round, and reactive to light.   Pulmonary:      Effort: Pulmonary effort is normal.   Musculoskeletal:         General: Normal range of motion.   Skin:     General: Skin is warm.   Neurological:      Motor: Motor strength is normal.      Coordination: Coordination is intact.      Deep Tendon Reflexes: Reflexes are normal and symmetric.   Psychiatric:         Mood and Affect: Mood normal.         Speech: Speech normal.         Behavior: Behavior normal.         Thought Content: Thought content normal.                   Assessment & Plan     Diagnoses and all orders for this visit:    1. Transient confusion (Primary)  -     CT angiogram head; Future  -     Cancel: CT Angiogram Neck With & Without Contrast; Future  -     EEG; Future  -     aspirin 81 MG EC tablet; Take 1 tablet by mouth Daily for 180 days.  Dispense: 30 tablet; Refill: 5  -     MRI Angiogram Neck Without Contrast; Future    I would like to go ahead and obtain CT angiogram of the head and neck due to some of the vessel disease seen on MRI of the brain to rule out any vascular cause for patient's symptoms.    In addition I would like to order EEG to rule out any potential epileptiform abnormalities that could have caused episode.    I would like to have patient started on aspirin 81 mg once daily as this could  have been TIA like symptoms.    Follow-up after testing is complete or in 1 month.              Betsy BARAHONA    Neurology    [facility] [place]    Phone: [phone]    9/28/2023 , 10:19 EDT

## 2023-09-28 NOTE — TELEPHONE ENCOUNTER
Caller: Navarro Bruno    Relationship: Self    Best call back number: [phone]    What form or medical record are you requesting: LETTER STATING THAT HE WAS SEEN TODAY, DIAGNOSIS, AND THAT HE IS NOT CLEARED TO GO BACK TO WORK YET.     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

## 2023-10-02 ENCOUNTER — OFFICE VISIT (OUTPATIENT)
Dept: INTERNAL MEDICINE | Facility: CLINIC | Age: 68
End: 2023-10-02
Payer: COMMERCIAL

## 2023-10-02 VITALS
TEMPERATURE: 97.3 F | OXYGEN SATURATION: 96 % | RESPIRATION RATE: 16 BRPM | SYSTOLIC BLOOD PRESSURE: 136 MMHG | HEIGHT: 75 IN | BODY MASS INDEX: 27.73 KG/M2 | DIASTOLIC BLOOD PRESSURE: 82 MMHG | WEIGHT: 223 LBS | HEART RATE: 55 BPM

## 2023-10-02 DIAGNOSIS — R80.9 TYPE 2 DIABETES MELLITUS WITH MICROALBUMINURIA, WITHOUT LONG-TERM CURRENT USE OF INSULIN: Chronic | ICD-10-CM

## 2023-10-02 DIAGNOSIS — R80.9 MICROALBUMINURIA: Chronic | ICD-10-CM

## 2023-10-02 DIAGNOSIS — Z00.00 ROUTINE PHYSICAL EXAMINATION: Primary | ICD-10-CM

## 2023-10-02 DIAGNOSIS — E55.9 VITAMIN D DEFICIENCY: Chronic | ICD-10-CM

## 2023-10-02 DIAGNOSIS — D64.9 CHRONIC ANEMIA: Chronic | ICD-10-CM

## 2023-10-02 DIAGNOSIS — E03.9 PRIMARY HYPOTHYROIDISM: Chronic | ICD-10-CM

## 2023-10-02 DIAGNOSIS — Z51.81 THERAPEUTIC DRUG MONITORING: ICD-10-CM

## 2023-10-02 DIAGNOSIS — R35.1 NOCTURIA: Chronic | ICD-10-CM

## 2023-10-02 DIAGNOSIS — E78.2 MIXED HYPERLIPIDEMIA: Chronic | ICD-10-CM

## 2023-10-02 DIAGNOSIS — N32.89 IRRITABLE BLADDER: Chronic | ICD-10-CM

## 2023-10-02 DIAGNOSIS — K52.9 CHRONIC DIARRHEA: Chronic | ICD-10-CM

## 2023-10-02 DIAGNOSIS — E11.9 ENCOUNTER FOR DIABETIC FOOT EXAM: Chronic | ICD-10-CM

## 2023-10-02 DIAGNOSIS — F17.210 NICOTINE DEPENDENCE, CIGARETTES, UNCOMPLICATED: Chronic | ICD-10-CM

## 2023-10-02 DIAGNOSIS — I10 BENIGN ESSENTIAL HYPERTENSION: Chronic | ICD-10-CM

## 2023-10-02 DIAGNOSIS — R41.0 INTERMITTENT CONFUSION: ICD-10-CM

## 2023-10-02 DIAGNOSIS — N40.1 BENIGN NON-NODULAR PROSTATIC HYPERPLASIA WITH LOWER URINARY TRACT SYMPTOMS: Chronic | ICD-10-CM

## 2023-10-02 DIAGNOSIS — E11.29 TYPE 2 DIABETES MELLITUS WITH MICROALBUMINURIA, WITHOUT LONG-TERM CURRENT USE OF INSULIN: Chronic | ICD-10-CM

## 2023-10-02 DIAGNOSIS — Z91.199 MEDICAL NON-COMPLIANCE: Chronic | ICD-10-CM

## 2023-10-02 DIAGNOSIS — Z90.49 HISTORY OF PARTIAL COLECTOMY: Chronic | ICD-10-CM

## 2023-10-02 DIAGNOSIS — E66.3 OVERWEIGHT (BMI 25.0-29.9): Chronic | ICD-10-CM

## 2023-10-02 DIAGNOSIS — Z87.39 HISTORY OF GOUT: Chronic | ICD-10-CM

## 2023-10-02 NOTE — PROGRESS NOTES
10/02/2023    Patient Information  Navarro Bruno                                                                                          [address]  [place] [address]      1955  [unfilled]  There is no work phone number on file.    Chief Complaint:     Routine annual physical examination.  No new acute complaints.    History of Present Illness:    Patient with multiple chronic medical problems as noted below including type 2 diabetes and episode of confusion possibly related to stroke presents today for his routine annual physical exam and follow-up blood work.  No new problems.  Past medical history reviewed and updated were necessary including health maintenance parameters.  This reveals he is up-to-date or else accounted for after this visit.    Review of Systems   Constitutional: Negative.   HENT: Negative.     Eyes: Negative.    Cardiovascular: Negative.    Respiratory: Negative.     Endocrine: Negative.    Hematologic/Lymphatic: Negative.    Skin: Negative.    Musculoskeletal: Negative.    Gastrointestinal: Negative.    Genitourinary: Negative.    Neurological: Negative.         No further episodes of intermittent confusion   Psychiatric/Behavioral: Negative.     Allergic/Immunologic: Negative.      Active Problems:    Patient Active Problem List   Diagnosis    Chronic anemia    Benign essential hypertension    Hyperlipidemia    Primary hypothyroidism    Irritable bladder    Nocturia    Type 2 diabetes mellitus with microalbuminuria, without long-term current use of insulin    Vitamin D deficiency    Therapeutic drug monitoring    Routine physical examination    Chronic diarrhea    History of partial colectomy, 3/6/2022--ileocolic resection for stricture.  12/17/2014--ileo-cecal colectomy with primary reanastomosis.  Obstruction secondary to NSAID-induced inflamed ileum.    Gout    Benign prostatic hypertrophy    Diabetic eye exam    Diabetic foot exam    Nicotine dependence,  cigarettes, uncomplicated    Medical non-compliance    Overweight (BMI 25.0-29.9)    Microalbuminuria    Intermittent confusion         Past Medical History:   Diagnosis Date    Benign essential hypertension 04/19/2007 04/19/2007--treatment for hypertension begun.    Benign prostatic hypertrophy 07/25/2016 07/25/2016--patient reports irritable bladder symptoms have resolved and he stopped using the Myrbetriq.  05/11/2015--patient presents with a one-month history of urinary urgency, occasional urge incontinence without dysuria. Patient has nocturia 2-3 times per night. Myrbetric 50 mg, one half by mouth daily initiated.    Chronic anemia 02/04/2015 07/19/2016--hemoglobin slightly low at 13.3.  Hematocrit normal at 42.0.  05/11/2015--patient seen in follow-up and had a recent colonoscopy which was negative. Serum iron studies were normal. Homocystine and methylmalonic acid were normal. No further workup. Recent CBC reveals a slightly low hemoglobin but a normal hematocrit.   02/04/2015--routine CBC reveals a low hemoglobin of 11.5, hematocrit    Chronic diarrhea 04/18/2016 07/25/2016--patient seen in follow-up and reports his diarrhea has improved but not resolved.  He reports that he WelChol did not help but he only took a maximum of 2 pills per day.  04/18/2016--patient with a history of partial colectomy performed in 2014 performed for an inflamed terminal ileum.  Part of the terminal ileum in the cecum removed.  Since that time patient presents with chronic diar    Diabetic eye exam 02/03/2017 02/03/2017--patient reports he has not had a diabetic eye examination.  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  Order given.    Diabetic foot exam 02/03/2017 02/03/2017--diabetic foot examination reveals no ulcers and no pre-ulcerative calluses.  He was left great toe and second toe have been ambulated from a lawnmower accident as a teenager.  Sensation is intact.    Gout  07/15/2010    07/15/2010--patient presented with complaints of left foot pain. Initial diagnosis of gout. Uric acid 8.3. Treatment begun with allopurinol.    History of echocardiogram 12/14/2014 12/14/2014--echocardiogram reveals left ventricular chamber size is normal. Mild concentric left ventricular hypertrophy. Normal left ventricular systolic function with ejection fraction of 58%. Abnormal left ventricular diastolic function is observed. Left Atrium normal. Right ventricular cavity size is mildly enlarged. The right ventricular global systolic function is normal. Right atrium is nor    History of Hepatitis, infectious Age 18    18 years of age--patient had infectious hepatitis. Exact type unknown.    History of partial colectomy, 3/6/2022--ileocolic resection for stricture.  12/17/2014--ileo-cecal colectomy with primary reanastomosis.  Obstruction secondary to NSAID-induced inflamed ileum. 12/17/2014 March 6, 2022--robotic assisted laparoscopic converted to open ileocolic resection for small bowel stricture.  12/17/2014--patient presented with a small bowel obstruction secondary to inflamed terminal ileum. He underwent ileo-cecal colectomy with primary reanastomosis.    History of small bowel obstruction 12/15/2014    01/19/2015--patient seen in follow-up and is doing well. No change in current medical regimen at the present time. Set up fasting lab and follow up. I explained to patient that we will need to monitor him for the development of vitamin B12 deficiency. He will certainly be at risk for this.   12/17/2014--patient presented with a small bowel obstruction secondary to inflamed terminal ileum. He under    Hyperlipidemia 06/13/2007 06/13/2007--treatment for hyperlipidemia begun.    Hypothyroidism 05/08/2015 05/19/2017--TSH elevated at 5.33.  Free T4 and free T3 are normal.  It appears the patient truly has hypothyroidism.  Levothyroxine 50 µg per day initiated.  01/27/2016--repeat thyroid  function tests normal.  11/11/2015--TSH elevated at 5.27. Free T4 low normal at 0.92. TPO antibodies less than 6. Repeat thyroid function tests ordered.  05/08/2015--TSH mildly elevated at 4.5. Free T4 low normal at    Irritable bladder 05/11/2016 02/03/2017--patient reports that he is irritable bladder symptoms have returned but they seem to be intermittent.  He describes urgency and frequency.  However, he was diabetes is not under optimal control and could be playing a role.  His A1c is less than 8 at the present time.  In the past Myrbetriq seemed to be helpful and I will give patient samples to have at the time that he needs it.  He is    Medical non-compliance 05/02/2019    Microalbuminuria 08/03/2021    August 3, 2021--routine follow-up.  Patient is diabetic and his urine microalbumin/creatinine ratio returned elevated at 129.4.  May need low-dose ACE inhibitor.    Nicotine dependence, cigarettes, uncomplicated 05/02/2019    Nocturia 03/29/2016 02/03/2017--patient seen in follow-up and continues to have nocturia 2-3 times per night.  He has irritable bladder symptoms consisting of urgency and occasional incontinence has returned as well.  Myrbetriq samples given and patient can use that as needed.  07/25/2016--patient reports irritable bladder symptoms have resolved and he stopped using the Myrbetriq.  05/11/2015--patient presents with a    Overweight (BMI 25.0-29.9) 08/03/2021    Stress incontinence     Type 2 diabetes mellitus with microalbuminuria, without long-term current use of insulin 03/09/2007 03/09/2007--patient presented with polyuria and polydipsia. Initial diagnosis of diabetes. Serum glucose 252, initial hemoglobin A1c 10.4.    Type 2 diabetes mellitus without complication, without long-term current use of insulin 03/09/2007 03/09/2007--patient presented with polyuria and polydipsia. Initial diagnosis of diabetes. Serum glucose 252, initial hemoglobin A1c 10.4.    Vitamin D  deficiency 04/12/2016         Past Surgical History:   Procedure Laterality Date    AMPUTATION FOOT / TOE  18 years old    18 years of age--left great toe and left second toe amputation from a lawnmower accident.    APPENDECTOMY  11 years old    11 years of age.    CATARACT EXTRACTION Bilateral 2013 2013--patient reports he had bilateral cataract surgery about 4 years ago.  Intraocular lenses placed.    COLECTOMY PARTIAL / TOTAL  12/17/2014 12/17/2014--patient presented with a small bowel obstruction secondary to inflamed terminal ileum. He underwent ileo-cecal colectomy with primary reanastomosis.    COLECTOMY PARTIAL / TOTAL N/A 03/06/2022 March 6, 2022--robotic assisted laparoscopic converted to open ileocolic resection for small bowel stricture.    COLON RESECTION N/A 03/02/2022    Procedure: Robotic assisted laparoscopic to Open ileocolic resection with anastomosis;  Surgeon: Guille Cedeno MD;  Location: [facility];  Service: Robotics - [facility];  Laterality: N/A;    COLONOSCOPY  04/06/2015 04/06/2015--colonoscopy revealed small internal hemorrhoids. Well healed ileocolic anastomosis. Fair prep.    COLONOSCOPY N/A 08/17/2021 August 17, 2021--colonoscopy revealed the ileal surgical anastomosis contained a benign-appearing intrinsic moderate stenosis that was nontraversed.  Biopsied.  Nonbleeding internal hemorrhoids.  Grade 1.  Pathology revealed acute ileitis with ulcer, stricture formation and associated serositis proximal to the anastomosis.  Intact anastomosis with focal suture granuloma.  13 benign lymph nodes.    EXPLORATORY LAPAROTOMY  12/17/2014 12/17/2014--patient presented with a small bowel obstruction secondary to inflamed terminal ileum. He underwent ileo-cecal colectomy with primary reanastomosis.         No Known Allergies        Current Outpatient Medications:     allopurinol (ZYLOPRIM) 300 MG tablet, TAKE 1 TABLET BY MOUTH EVERY DAY, Disp: 90 tablet, Rfl: 1     amLODIPine (NORVASC) 2.5 MG tablet, Take 1 p.o. every morning for high blood pressure, Disp: 30 tablet, Rfl: 1    aspirin 81 MG EC tablet, Take 1 tablet by mouth Daily for 180 days., Disp: 30 tablet, Rfl: 5    atorvastatin (LIPITOR) 80 MG tablet, TAKE 1 TABLET BY MOUTH EVERY DAY FOR CHOLESTEROL, Disp: 90 tablet, Rfl: 0    B-D ULTRAFINE III SHORT PEN 31G X 8 MM misc, Use with Victoza injections daily., Disp: , Rfl: 3    Bioflavonoid Products (SUPER C-500 PO), Take 500 mg by mouth Daily., Disp: , Rfl:     Calcium Citrate-Vitamin D (CALCIUM CITRATE + D3 MAXIMUM PO), Take 1 tablet by mouth Daily., Disp: , Rfl:     levothyroxine (SYNTHROID, LEVOTHROID) 50 MCG tablet, TAKE 1 TABLET BY MOUTH EVERY DAY FOR LOW THYROID, Disp: 90 tablet, Rfl: 2    metoprolol tartrate (LOPRESSOR) 25 MG tablet, Take 1 p.o. twice daily for high blood pressure, Disp: 60 tablet, Rfl: 4    Probiotic Product (PROBIOTIC PO), Take 1 tablet by mouth Daily., Disp: , Rfl:     Semaglutide (Rybelsus) 7 MG tablet, Take 7 mg by mouth Daily., Disp: 30 tablet, Rfl: 4    SITagliptin-metFORMIN HCl ER (Janumet XR)  MG tablet, Take two tablets by mouth daily for diabetes., Disp: 60 tablet, Rfl: 4    vitamin D3 125 MCG (5000 UT) capsule capsule, Take 1 capsule by mouth Daily., Disp: , Rfl:       Family History   Problem Relation Age of Onset    Scoliosis Mother         Amyotrophic Lateral Sclerosis    Stroke Father         Father had multiple strokes.    Diabetes Father         Type 2    Hypertension Father     Diabetes Paternal Grandfather         Type 2    Malig Hyperthermia Neg Hx          Social History     Socioeconomic History    Marital status:     Years of education: 14   Tobacco Use    Smoking status: Every Day     Packs/day: 1.00     Types: Cigarettes    Smokeless tobacco: Never    Tobacco comments:     STARTED IN HIGH SCHOOL OFF AND ON, QUIT FOR 10 YEARS, STARTED AGAIN 9 YEARS AGO   Vaping Use    Vaping Use: Never used   Substance and  "Sexual Activity    Alcohol use: Not Currently    Drug use: Not Currently     Types: Marijuana     Comment: Quit 20 years ago.     Sexual activity: Defer         Vitals:    10/02/23 0730   BP: 136/82   Pulse: 55   Resp: 16   Temp: 97.3 °F (36.3 °C)   TempSrc: Temporal   SpO2: 96%   Weight: 101 kg (223 lb)   Height: 190.5 cm (75\")        Body mass index is 27.87 kg/m².      Physical Exam:    General: Alert and oriented x 3.  No acute distress.  Overweight.  Normal affect.  HEENT: Pupils equal, round, reactive to light; extraocular movements intact; sclerae nonicteric; pharynx, ear canals and TMs normal.  Neck: Without JVD, thyromegaly, bruit, or adenopathy.  Lungs: Clear to auscultation in all fields.  Heart: Regular rate and rhythm without murmur, rub, gallop, or click.  Abdomen: Soft, nontender, without hepatosplenomegaly or hernia.  Bowel sounds normal.  : Deferred.  Rectal: Deferred.  Extremities: Without clubbing, cyanosis, edema, or pulse deficit.  Neurologic: Intact without focal deficit.  Normal station and gait observed during ingress and egress from the examination room.  Skin: Without significant lesion.  Musculoskeletal: Unremarkable.    October 2, 2023--routine diabetic foot exam reveals no evidence of diabetic foot ulcer or preulcerative callus.  Left great toe amputated previously as well as the second toe due to an accident.  Pulses and sensation intact.    Lab/other results:    Uric acid normal at 5.9.  Vitamin D normal at 31.3.  Urinalysis normal except protein and glucose.  PSA normal at 1.9.  Thyroid function test normal.  Total cholesterol 121, triglycerides 184, LDL particle #641, HDL particle #31.2.  Microalbumin/creatinine ratio elevated at 78.  Hemoglobin A1c 7.3.  CMP normal except glucose 290.  CPK normal.  CBC normal.    Assessment/Plan:     Diagnosis Plan   1. Routine physical examination        2. Type 2 diabetes mellitus with microalbuminuria, without long-term current use of insulin  " Comprehensive Metabolic Panel    Hemoglobin A1c      3. Microalbuminuria  Microalbumin / Creatinine Urine Ratio - Urine, Clean Catch      4. Hyperlipidemia  CK    Comprehensive Metabolic Panel    NMR LipoProfile      5. Primary hypothyroidism  TSH    T4, Free    T3, Free      6. Vitamin D deficiency  Vitamin D,25-Hydroxy      7. Gout        8. Diabetic foot exam        9. Chronic anemia  CBC (No Diff)      10. Benign prostatic hypertrophy        11. Benign essential hypertension        12. Chronic diarrhea        13. History of partial colectomy, 3/6/2022--ileocolic resection for stricture.  12/17/2014--ileo-cecal colectomy with primary reanastomosis.  Obstruction secondary to NSAID-induced inflamed ileum.        14. Irritable bladder        15. Nicotine dependence, cigarettes, uncomplicated        16. Nocturia        17. Overweight (BMI 25.0-29.9)        18. Intermittent confusion        19. Medical non-compliance        20. Therapeutic drug monitoring          Patient presents for his routine annual physical exam/preventative visit.  He has type 2 diabetes that is under reasonable control on the current regimen.  He has mild microalbuminuria and should be on medication to help with this such as an ACE or an ARB or one of the newer glucose transport medications.  See below.  His cholesterol is under good control.  His thyroid is therapeutic.  Vitamin D is in normal range with supplementation.  Gout is stable on allopurinol.  His blood pressure appears to be controlled but barely at this point.  Weight loss.  Chronic anemia has resolved.  BPH not symptomatic enough to require medications at the present time.  Chronic diarrhea controlled as is irritable bladder symptoms.  Patient continues to smoke cigarettes and is currently not motivated to undergo smoking cessation and actually quit.  Compliance has been a problem in the past with this patient.  Patient had a recent episode of intermittent confusion of uncertain  etiology.  No stroke noted on MRI of the brain but patient does have signs of vascular disease and there was evidence of a possible old lacunar infarct.  He saw the neurology service and a CT angiogram of the head and neck was ordered as well as an EEG.  These are pending.    Several preventative health issues discussed including review of vaccinations and recommendations, including dietary issues, exercise and weight loss.  Safe sex practices discussed.  Patient advised to wear seatbelt whenever driving and avoid texting and driving.  Also advised to look both ways before crossing the street.  Patient is up-to-date with colon cancer screening..  Advised to avoid tobacco products and minimize alcohol consumption.    Plan is as follows: Strongly recommend low carbohydrate diet, exercise, and weight loss.  I am not going to make any changes to his current medical regimen at this time.  Cost of the medicine is a concern.  As mentioned, he would benefit from ACE or ARB or one of the newer glucose transport medications which would be my preference.  We will revisit this at the next visit and hopefully circumstances will change.  Patient will follow-up in 6 months with lab prior or follow-up as needed.      Procedures

## 2023-10-06 ENCOUNTER — TELEPHONE (OUTPATIENT)
Dept: NEUROLOGY | Facility: CLINIC | Age: 68
End: 2023-10-06
Payer: MEDICARE

## 2023-10-06 NOTE — TELEPHONE ENCOUNTER
PATIENT CALLING TO ADVISE, HIS INSURANCE, [employer] SAYS THEY HAVE NOT RECEIVED A CLAIM FOR HIS LAST OFFICE VISIT.    HE STATES THAT THE  DID TAKE HIS INFORMATION AT LAST OV.    INSURANCE GAVE HIM A FAX # OF  [phone] AND ASKED THAT THE CLAIM BE RESUBMITTED.

## 2023-10-16 ENCOUNTER — PREP FOR SURGERY (OUTPATIENT)
Dept: OTHER | Facility: HOSPITAL | Age: 68
End: 2023-10-16
Payer: MEDICARE

## 2023-10-16 ENCOUNTER — HOSPITAL ENCOUNTER (OUTPATIENT)
Dept: SLEEP MEDICINE | Facility: HOSPITAL | Age: 68
Discharge: HOME | End: 2023-10-16
Admitting: NURSE PRACTITIONER
Payer: COMMERCIAL

## 2023-10-16 DIAGNOSIS — R41.0 TRANSIENT CONFUSION: ICD-10-CM

## 2023-10-16 DIAGNOSIS — Z86.010 HISTORY OF ADENOMATOUS POLYP OF COLON: Primary | ICD-10-CM

## 2023-10-16 DIAGNOSIS — Z80.0 FAMILY HISTORY OF COLON CANCER: ICD-10-CM

## 2023-10-16 PROCEDURE — 95813 EEG EXTND MNTR 61-119 MIN: CPT

## 2023-10-16 PROCEDURE — 95813 EEG EXTND MNTR 61-119 MIN: CPT | Performed by: PSYCHIATRY & NEUROLOGY

## 2023-10-19 ENCOUNTER — TELEPHONE (OUTPATIENT)
Dept: NEUROLOGY | Facility: CLINIC | Age: 68
End: 2023-10-19
Payer: MEDICARE

## 2023-10-20 ENCOUNTER — PREP FOR SURGERY (OUTPATIENT)
Dept: OTHER | Facility: HOSPITAL | Age: 68
End: 2023-10-20
Payer: MEDICARE

## 2023-10-20 DIAGNOSIS — Z86.010 HISTORY OF ADENOMATOUS POLYP OF COLON: Primary | ICD-10-CM

## 2023-10-26 ENCOUNTER — HOSPITAL ENCOUNTER (OUTPATIENT)
Dept: CT IMAGING | Facility: HOSPITAL | Age: 68
Discharge: HOME | End: 2023-10-26
Payer: MEDICARE

## 2023-10-26 ENCOUNTER — HOSPITAL ENCOUNTER (OUTPATIENT)
Dept: MRI IMAGING | Facility: HOSPITAL | Age: 68
Discharge: HOME | End: 2023-10-26
Payer: COMMERCIAL

## 2023-10-26 DIAGNOSIS — R41.0 TRANSIENT CONFUSION: ICD-10-CM

## 2023-10-26 PROCEDURE — 70547 MR ANGIOGRAPHY NECK W/O DYE: CPT

## 2023-10-26 PROCEDURE — 82565 ASSAY OF CREATININE: CPT

## 2023-10-26 PROCEDURE — 25510000001 IOPAMIDOL 61 % SOLUTION: Performed by: NURSE PRACTITIONER

## 2023-10-26 PROCEDURE — 70496 CT ANGIOGRAPHY HEAD: CPT

## 2023-10-26 RX ADMIN — IOPAMIDOL 100 ML: 612 INJECTION, SOLUTION INTRAVENOUS at 09:46

## 2023-10-27 LAB — CREAT BLDA-MCNC: 0.8 MG/DL (ref 0.6–1.3)

## 2023-11-01 ENCOUNTER — OFFICE VISIT (OUTPATIENT)
Dept: NEUROLOGY | Facility: CLINIC | Age: 68
End: 2023-11-01
Payer: MEDICARE

## 2023-11-01 VITALS
DIASTOLIC BLOOD PRESSURE: 70 MMHG | WEIGHT: 227 LBS | HEART RATE: 74 BPM | SYSTOLIC BLOOD PRESSURE: 136 MMHG | HEIGHT: 75 IN | BODY MASS INDEX: 28.23 KG/M2

## 2023-11-01 DIAGNOSIS — Z86.73 OLD LACUNAR STROKE WITHOUT LATE EFFECT: ICD-10-CM

## 2023-11-01 DIAGNOSIS — I10 BENIGN ESSENTIAL HYPERTENSION: Chronic | ICD-10-CM

## 2023-11-01 DIAGNOSIS — R41.0 TRANSIENT CONFUSION: Primary | ICD-10-CM

## 2023-11-01 PROBLEM — Z86.010 HISTORY OF ADENOMATOUS POLYP OF COLON: Status: ACTIVE | Noted: 2023-10-20

## 2023-11-01 PROBLEM — Z86.0101 HISTORY OF ADENOMATOUS POLYP OF COLON: Status: ACTIVE | Noted: 2023-10-20

## 2023-11-01 RX ORDER — AMLODIPINE BESYLATE 2.5 MG/1
TABLET ORAL
Qty: 30 TABLET | Refills: 1 | Status: SHIPPED | OUTPATIENT
Start: 2023-11-01

## 2023-11-01 NOTE — PROGRESS NOTES
[facility] NEUROLOGY         Date of Visit: 2023    Name: Navarro Bruno    :  1955    PCP: Carlos Chavira MD    Visit Type: follow-up         Subjective     Patient ID: Navarro is a 68 y.o. male.         History of Present Illness  I have had the pleasure of seeing your patient for the first time today.  As you may know he is a 68-year-old male here today for initial evaluation for episode of intermittent confusion.  He was referred by his primary care doctor Dr. Chavira.    History:    Patient does have a history of type 2 diabetes mellitus, hypertension, hyperlipidemia.    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  This is a drive he has made 100s of times and is very familiar with.  He states however as he got closer to his destination he became confused stating he did not really know where he was or recognize the area.  He states he ended up making it to his drop.  Okay and ended up driving home without any difficulty.  However when he mentioned the episode to his employer they did recommend additional follow-up and work-up with his primary care physician and clearance prior to returning to driving.    Patient did see his primary care physician on 2023 for initial evaluation.  At that time his PCP did end up ordering an MRI of the brain.  He also found that the patient had not been taking a couple of his medications including his Janumet for his diabetes due to prescription cost as well as he was only taking him his metoprolol once daily and blood pressure was significantly elevated.  He did end up adjusting blood pressure medication and restarting patient on his diabetic medication.    Patient did end up having MRI of the brain.  MRI revealed:    MRI Brain With & Without Contrast    Result Date: 2023  MRI OF THE BRAIN WITH AND WITHOUT CONTRAST ON 2023  CLINICAL HISTORY: Patient presents with  transient episodes of intermittent confusion.  TECHNIQUE: Axial T1, FLAIR, fat-suppressed T2, axial diffusion and gradient echo T2, sagittal T1 and postcontrast axial fat-suppressed T1 and coronal T1-weighted images were obtained of the entire head.  There are no prior studies from [facility] for comparison.  FINDINGS: There is minimal T2 high signal in the periventricular white matter and there are scattered tiny patchy nodular foci in the frontoparietal subcortical white matter consistent with mild small vessel disease. There is a 5 mm old lacunar infarct in the body of the right caudate nucleus. The remainder of the brain parenchyma is normal in signal intensity. Specifically no diffusion-weighted abnormality is seen with no acute infarct identified. On the gradient echo T2 weighted images, no acute or old blood breakdown products are seen intracranially. The ventricles are normal in size. I see no focal mass effect. There is no midline shift. No extra-axial fluid collections are identified. No abnormal areas of enhancement are seen in the head. The paranasal sinuses and the mastoid air cells and middle ear cavities are clear. Good flow voids are demonstrated within the cerebral vessels and in the dural venous sinuses. The calvarium and skull base demonstrate normal marrow signal intensity. Bilateral intraocular lens implants are present within the globes from previous bilateral cataract surgery. 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      Impression:  1. No acute intracranial abnormality is identified. There is mild small vessel disease in the cerebral white matter and a 5 mm old lacunar infarct in the body of the right caudate nucleus. Bilateral intraocular lens implants are in place from previous bilateral cataract  surgery. There is a 6 x 5 x 5 mm ovoid T2 hyperintense T1 hypointense focus between the anterior and posterior lobes of the pituitary could be a small Rathke cleft cyst felt to be of no acute significance. The remainder of the MRI of the brain is normal. The etiology of this patient's transient episode of confusion is not established on this exam.  This report was finalized on 9/26/2023 10:12 AM by Dr. Philippe Bocanegra M.D.      9/28/2023: We ordered CTA head and MRA neck which revealed no significant vessel disease. Patient did have mild spinal canal stenosis at C5-6 and C6-7. Patient is aysmptomatic for complaints for neck. We also recommended baby Asprin. EEG showed some mild bilateral frontal slowing with no other abnormalities.    Current:    Patient has not had any additional episodes of confusion, strokelike symptoms, or memory issues.  He states that he has been doing well overall. 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         The following portions of the patient's history were reviewed and updated as appropriate: allergies, current medications, past family history, past medical history, past social history, past surgical history, and problem list.                 Review of Systems   Constitutional:  Negative for activity change, appetite change, fatigue and unexpected weight change.   HENT:  Negative for hearing loss and trouble swallowing.    Eyes:  Negative for visual disturbance.   Respiratory:  Negative for chest tightness and shortness of breath.    Cardiovascular:  Negative for palpitations.   Gastrointestinal:  Negative for constipation, diarrhea, nausea and vomiting.   Genitourinary:  Negative for decreased urine volume, difficulty urinating and frequency.   Musculoskeletal:  Negative for gait problem.   Neurological:  Negative for tremors, syncope, facial asymmetry, speech difficulty, weakness and light-headedness.  "  Psychiatric/Behavioral:  Positive for confusion. Negative for agitation, behavioral problems, dysphoric mood, hallucinations and sleep disturbance. The patient is not nervous/anxious.             Current Medications:    Current Outpatient Medications   Medication Instructions    allopurinol (ZYLOPRIM) 300 MG tablet TAKE 1 TABLET BY MOUTH EVERY DAY    amLODIPine (NORVASC) 2.5 MG tablet Take 1 p.o. every morning for high blood pressure    aspirin 81 mg, Oral, Daily    atorvastatin (LIPITOR) 80 MG tablet TAKE 1 TABLET BY MOUTH EVERY DAY FOR CHOLESTEROL    B-D ULTRAFINE III SHORT PEN 31G X 8 MM misc Use with Victoza injections daily.    Bioflavonoid Products (SUPER C-500 PO) 500 mg, Oral, Daily    Calcium Citrate-Vitamin D (CALCIUM CITRATE + D3 MAXIMUM PO) 1 tablet, Oral, Daily    levothyroxine (SYNTHROID, LEVOTHROID) 50 MCG tablet TAKE 1 TABLET BY MOUTH EVERY DAY FOR LOW THYROID    metoprolol tartrate (LOPRESSOR) 25 MG tablet Take 1 p.o. twice daily for high blood pressure    Probiotic Product (PROBIOTIC PO) 1 tablet, Oral, Daily    Rybelsus 7 mg, Oral, Daily    SITagliptin-metFORMIN HCl ER (Janumet XR)  MG tablet Take two tablets by mouth daily for diabetes.    vitamin D3 5,000 Units, Oral, Daily          /70   Pulse 74   Ht 190.5 cm (75\")   Wt 103 kg (227 lb)   BMI 28.37 kg/m²                Objective     Neurological Exam  Mental Status  Awake, alert and oriented to person, place and time. Recent and remote memory are intact. Speech is normal. Language is fluent with no aphasia. Attention and concentration are normal.    Cranial Nerves  CN II: Visual fields full to confrontation.  CN III, IV, VI: Extraocular movements intact bilaterally. Normal lids and orbits bilaterally. Pupils equal round and reactive to light bilaterally.  CN V: Facial sensation is normal.  CN VII: Full and symmetric facial movement.  CN IX, X: Palate elevates symmetrically  CN XI: Shoulder shrug strength is normal.  CN XII: " Tongue midline without atrophy or fasciculations.    Motor  Normal muscle bulk throughout. Normal muscle tone. No abnormal involuntary movements. Strength is 5/5 throughout all four extremities.    Sensory  Sensation is intact to light touch, pinprick, vibration and proprioception in all four extremities.    Reflexes  Deep tendon reflexes are 2+ and symmetric in all four extremities.    Coordination    Finger-to-nose, rapid alternating movements and heel-to-shin normal bilaterally without dysmetria.    Gait  Normal casual, toe, heel and tandem gait.      Physical Exam  Constitutional:       Appearance: Normal appearance. He is normal weight.   HENT:      Head: Normocephalic.   Eyes:      General: Lids are normal.      Extraocular Movements: Extraocular movements intact.      Pupils: Pupils are equal, round, and reactive to light.   Pulmonary:      Effort: Pulmonary effort is normal.   Musculoskeletal:         General: Normal range of motion.   Skin:     General: Skin is warm.   Neurological:      Motor: Motor strength is normal.     Coordination: Coordination is intact.      Deep Tendon Reflexes: Reflexes are normal and symmetric.   Psychiatric:         Mood and Affect: Mood normal.         Speech: Speech normal.         Behavior: Behavior normal.         Thought Content: Thought content normal.                     Assessment & Plan     Diagnoses and all orders for this visit:    1. Transient confusion (Primary)    2. Old lacunar stroke without late effect       At this time we did discuss results of EEG and CTA head and neck.    At this time he is cleared to drive normal vehicle. 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     Continue asprin 81 mg.    Follow up on an as needed basis.            Betsy BARAHONA    Neurology    [facility] [place]    Phone: [phone]    11/1/2023 , 08:38 EDT

## 2023-12-13 PROBLEM — Z80.0 FAMILY HISTORY OF COLON CANCER: Status: ACTIVE | Noted: 2023-10-16

## 2024-01-18 ENCOUNTER — TELEPHONE (OUTPATIENT)
Dept: SURGERY | Facility: CLINIC | Age: 69
End: 2024-01-18
Payer: MEDICARE

## 2024-01-18 NOTE — TELEPHONE ENCOUNTER
Patient left message wanting to reschedule his colonoscopy. I have left him a message to call back and reschedule

## 2024-01-19 DIAGNOSIS — E78.2 MIXED HYPERLIPIDEMIA: ICD-10-CM

## 2024-01-19 DIAGNOSIS — I10 BENIGN ESSENTIAL HYPERTENSION: Chronic | ICD-10-CM

## 2024-01-19 DIAGNOSIS — E11.9 TYPE 2 DIABETES MELLITUS WITHOUT COMPLICATION, WITHOUT LONG-TERM CURRENT USE OF INSULIN: Chronic | ICD-10-CM

## 2024-01-19 RX ORDER — AMLODIPINE BESYLATE 2.5 MG/1
TABLET ORAL
Qty: 30 TABLET | Refills: 0 | Status: SHIPPED | OUTPATIENT
Start: 2024-01-19

## 2024-01-19 RX ORDER — ORAL SEMAGLUTIDE 7 MG/1
1 TABLET ORAL DAILY
Qty: 30 TABLET | Refills: 0 | Status: SHIPPED | OUTPATIENT
Start: 2024-01-19

## 2024-01-19 RX ORDER — ATORVASTATIN CALCIUM 80 MG/1
TABLET, FILM COATED ORAL
Qty: 90 TABLET | Refills: 0 | Status: SHIPPED | OUTPATIENT
Start: 2024-01-19

## 2024-01-19 NOTE — TELEPHONE ENCOUNTER
Rx Refill Note  Requested Prescriptions     Pending Prescriptions Disp Refills    atorvastatin (LIPITOR) 80 MG tablet [Pharmacy Med Name: Atorvastatin Calcium Oral Tablet 80 MG] 90 tablet 0     Sig: TAKE 1 TABLET BY MOUTH EVERY DAY FOR CHOLESTEROL    Rybelsus 7 MG tablet [Pharmacy Med Name: Rybelsus Oral Tablet 7 MG] 30 tablet 0     Sig: TAKE 1 TABLET BY MOUTH EVERY DAY    amLODIPine (NORVASC) 2.5 MG tablet [Pharmacy Med Name: amLODIPine Besylate Oral Tablet 2.5 MG] 30 tablet 0     Sig: TAKE 1 TABLET BY MOUTH IN THE MORNING FOR HIGH BLOOD PRESSURE      Last office visit with prescribing clinician: 10/2/2023   Last telemedicine visit with prescribing clinician: Visit date not found   Next office visit with prescribing clinician: 4/8/2024                         Would you like a call back once the refill request has been completed: [] Yes [] No    If the office needs to give you a call back, can they leave a voicemail: [] Yes [] No    Shreya Ramos MA  01/19/24, 13:02 EST

## 2024-01-23 DIAGNOSIS — E11.9 TYPE 2 DIABETES MELLITUS WITHOUT COMPLICATION, WITHOUT LONG-TERM CURRENT USE OF INSULIN: Chronic | ICD-10-CM

## 2024-01-23 RX ORDER — ORAL SEMAGLUTIDE 7 MG/1
1 TABLET ORAL DAILY
Qty: 90 TABLET | Refills: 0 | Status: SHIPPED | OUTPATIENT
Start: 2024-01-23

## 2024-03-01 ENCOUNTER — TELEPHONE (OUTPATIENT)
Dept: INTERNAL MEDICINE | Facility: CLINIC | Age: 69
End: 2024-03-01

## 2024-03-01 NOTE — TELEPHONE ENCOUNTER
Caller: Navarro Bruno    Relationship: Self    Best call back number: [phone]    What was the call regarding: PATIENT STATES THAT HE IS DOWN TO TWO PILLS OF JANUMENT AND REBELLIOUS. PATIENT WOULD LIKE TO KNOW IF THERE ARE SAMPLES AT THE OFFICE.

## 2024-03-02 DIAGNOSIS — I10 BENIGN ESSENTIAL HYPERTENSION: Chronic | ICD-10-CM

## 2024-03-04 ENCOUNTER — TELEPHONE (OUTPATIENT)
Dept: INTERNAL MEDICINE | Facility: CLINIC | Age: 69
End: 2024-03-04

## 2024-03-04 RX ORDER — AMLODIPINE BESYLATE 2.5 MG/1
TABLET ORAL
Qty: 30 TABLET | Refills: 0 | Status: SHIPPED | OUTPATIENT
Start: 2024-03-04

## 2024-03-04 NOTE — TELEPHONE ENCOUNTER
Name: Naavrro Bruno      Relationship: Self      Best Callback Number:       HUB PROVIDED THE RELAY MESSAGE FROM THE OFFICE      PATIENT: VOICED UNDERSTANDING AND HAS NO FURTHER QUESTIONS AT THIS TIME    ADDITIONAL INFORMATION: PATIENT WANTS TO BE CALLED ONCE YOU GET SAMPLES IN THE OFFICE AS HE IS OUT.

## 2024-03-04 NOTE — TELEPHONE ENCOUNTER
Caller: TRUMAN LACY    Relationship:PATIENT    Callback number: [phone]   Is it ok to leave a message: [x] Yes [] No    Requested medication for samples:   Semaglutide (Rybelsus) 7 MG tablet     SITagliptin-metFORMIN HCl ER (Janumet XR)  MG tablet     How much medication does the patient currently have left: HE IS OUT     Who will be picking up the samples: PATIENT    Do you need information about patient financial assistance for this medication: [] Yes [x] No    Additional details provided: HE LOST HIS JOB AND NEEDS SAMPLES

## 2024-03-07 ENCOUNTER — TELEPHONE (OUTPATIENT)
Dept: INTERNAL MEDICINE | Facility: CLINIC | Age: 69
End: 2024-03-07
Payer: MEDICARE

## 2024-03-07 DIAGNOSIS — E11.9 TYPE 2 DIABETES MELLITUS WITHOUT COMPLICATION, WITHOUT LONG-TERM CURRENT USE OF INSULIN: Chronic | ICD-10-CM

## 2024-03-07 DIAGNOSIS — E11.9 TYPE 2 DIABETES MELLITUS WITHOUT COMPLICATION: ICD-10-CM

## 2024-03-07 RX ORDER — ORAL SEMAGLUTIDE 7 MG/1
1 TABLET ORAL DAILY
Qty: 90 TABLET | Refills: 1 | Status: SHIPPED | OUTPATIENT
Start: 2024-03-07

## 2024-03-07 RX ORDER — SITAGLIPTIN AND METFORMIN HYDROCHLORIDE 1000; 50 MG/1; MG/1
TABLET, FILM COATED, EXTENDED RELEASE ORAL
Qty: 60 TABLET | Refills: 4 | Status: SHIPPED | OUTPATIENT
Start: 2024-03-07

## 2024-03-07 NOTE — TELEPHONE ENCOUNTER
Caller: SELF     [phone] (Mobile)            Is it ok to leave a message: [x] Yes [] No    Requested medication for samples:   SITagliptin-metFORMIN HCl ER (Janumet XR)  MG tablet     Semaglutide (Rybelsus) 7 MG tablet       How much medication does the patient currently have left:OUT FOR TWO WEEKW    Who will be picking up the samples:SELF    Do you need information about patient financial assistance for this medication: [] Yes [x] No

## 2024-04-10 ENCOUNTER — TELEPHONE (OUTPATIENT)
Dept: INTERNAL MEDICINE | Facility: CLINIC | Age: 69
End: 2024-04-10

## 2024-05-03 ENCOUNTER — TELEPHONE (OUTPATIENT)
Dept: INTERNAL MEDICINE | Facility: CLINIC | Age: 69
End: 2024-05-03

## 2024-05-03 DIAGNOSIS — I10 BENIGN ESSENTIAL HYPERTENSION: Chronic | ICD-10-CM

## 2024-05-03 NOTE — TELEPHONE ENCOUNTER
Hub staff attempted to follow warm transfer process and was unsuccessful     Caller: Navarro Bruno    Relationship to patient: Self    Best call back number: [phone]    Patient is needing: PATIENT WOULD LIKE A PHONE CALL IF YOU HAVE SAMPLES OF JANUMATE OR RYBHELLSUS. PLEASE CALL

## 2024-05-04 DIAGNOSIS — I10 BENIGN ESSENTIAL HYPERTENSION: Chronic | ICD-10-CM

## 2024-05-06 RX ORDER — AMLODIPINE BESYLATE 2.5 MG/1
TABLET ORAL
Qty: 30 TABLET | Refills: 0 | Status: SHIPPED | OUTPATIENT
Start: 2024-05-06

## 2024-06-04 DIAGNOSIS — I10 BENIGN ESSENTIAL HYPERTENSION: Chronic | ICD-10-CM

## 2024-06-04 RX ORDER — AMLODIPINE BESYLATE 2.5 MG/1
TABLET ORAL
Qty: 30 TABLET | Refills: 0 | Status: SHIPPED | OUTPATIENT
Start: 2024-06-04

## 2024-07-12 DIAGNOSIS — I10 BENIGN ESSENTIAL HYPERTENSION: Chronic | ICD-10-CM

## 2024-07-15 RX ORDER — AMLODIPINE BESYLATE 2.5 MG/1
TABLET ORAL
Qty: 30 TABLET | Refills: 0 | Status: SHIPPED | OUTPATIENT
Start: 2024-07-15

## 2024-07-22 ENCOUNTER — TELEPHONE (OUTPATIENT)
Dept: INTERNAL MEDICINE | Facility: CLINIC | Age: 69
End: 2024-07-22

## 2024-07-22 NOTE — TELEPHONE ENCOUNTER
Caller: Navarro Bruno    Relationship to patient: Self    Best call back number: [phone]    New or established patient?  [] New  [] Established    Date of discharge: 7/21/24    Facility discharged from: [facility]     Diagnosis/Symptoms: PASSING OUT    Length of stay (If applicable):  3 DAYS    Specialty Only: Did you see a [facility] provider?    [] Yes  [x] No  If so, who?      PATIENT CALLED AND NEEDS TO SCHEDULE DUE TO THE HUB DOES NOT HAVE ANYTHING WITHIN 7 DAYS.  PATIENT WANTS THE APPT AS SOON AS POSSIBLE, DUE TO HE NEEDS TO GET BACK TO WORK.  PLEASE CALL HIM BACK.

## 2024-07-29 ENCOUNTER — TELEPHONE (OUTPATIENT)
Dept: INTERNAL MEDICINE | Facility: CLINIC | Age: 69
End: 2024-07-29

## 2024-07-29 NOTE — TELEPHONE ENCOUNTER
Caller: LANA LACY    Relationship: Brother/Sister    Best call back number: [phone]     What is the best time to reach you: ANYTIME IN THE AFTERNOON    Who are you requesting to speak with (clinical staff, provider,  specific staff member): CLINICAL    What was the call regarding: PATIENTS SISTER CALLING STATING THAT HE HAS AN UPDATED WILL AND DNR SHE WOULD LIKE TO GET TO THE OFFICE THE ONLY WAY IT CAN BE SENT IS EMAIL. SHE WOULD LIKE TO KNOW IF THERE IS AN EMAIL SHE CAN SEND IT TO.

## 2024-07-30 ENCOUNTER — OFFICE VISIT (OUTPATIENT)
Dept: INTERNAL MEDICINE | Facility: CLINIC | Age: 69
End: 2024-07-30
Payer: MEDICARE

## 2024-07-30 VITALS
BODY MASS INDEX: 26.73 KG/M2 | RESPIRATION RATE: 14 BRPM | HEIGHT: 75 IN | SYSTOLIC BLOOD PRESSURE: 126 MMHG | OXYGEN SATURATION: 98 % | WEIGHT: 215 LBS | HEART RATE: 60 BPM | DIASTOLIC BLOOD PRESSURE: 72 MMHG

## 2024-07-30 DIAGNOSIS — R80.9 MICROALBUMINURIA: Chronic | ICD-10-CM

## 2024-07-30 DIAGNOSIS — Z87.39 HISTORY OF GOUT: Chronic | ICD-10-CM

## 2024-07-30 DIAGNOSIS — R55 SYNCOPE AND COLLAPSE: ICD-10-CM

## 2024-07-30 DIAGNOSIS — N40.1 BENIGN NON-NODULAR PROSTATIC HYPERPLASIA WITH LOWER URINARY TRACT SYMPTOMS: Chronic | ICD-10-CM

## 2024-07-30 DIAGNOSIS — E55.9 VITAMIN D DEFICIENCY: Chronic | ICD-10-CM

## 2024-07-30 DIAGNOSIS — E11.29 TYPE 2 DIABETES MELLITUS WITH MICROALBUMINURIA, WITHOUT LONG-TERM CURRENT USE OF INSULIN: Chronic | ICD-10-CM

## 2024-07-30 DIAGNOSIS — Z91.199 MEDICAL NON-COMPLIANCE: Chronic | ICD-10-CM

## 2024-07-30 DIAGNOSIS — N32.89 IRRITABLE BLADDER: Chronic | ICD-10-CM

## 2024-07-30 DIAGNOSIS — E53.8 VITAMIN B12 DEFICIENCY: ICD-10-CM

## 2024-07-30 DIAGNOSIS — Z09 HOSPITAL DISCHARGE FOLLOW-UP: Primary | ICD-10-CM

## 2024-07-30 DIAGNOSIS — I10 BENIGN ESSENTIAL HYPERTENSION: Chronic | ICD-10-CM

## 2024-07-30 DIAGNOSIS — E78.2 MIXED HYPERLIPIDEMIA: Chronic | ICD-10-CM

## 2024-07-30 DIAGNOSIS — E03.9 PRIMARY HYPOTHYROIDISM: Chronic | ICD-10-CM

## 2024-07-30 DIAGNOSIS — R80.9 TYPE 2 DIABETES MELLITUS WITH MICROALBUMINURIA, WITHOUT LONG-TERM CURRENT USE OF INSULIN: Chronic | ICD-10-CM

## 2024-07-30 DIAGNOSIS — D64.9 CHRONIC ANEMIA: Chronic | ICD-10-CM

## 2024-07-30 DIAGNOSIS — Z51.81 THERAPEUTIC DRUG MONITORING: ICD-10-CM

## 2024-07-30 DIAGNOSIS — R41.0 INTERMITTENT CONFUSION: ICD-10-CM

## 2024-07-30 DIAGNOSIS — F17.210 NICOTINE DEPENDENCE, CIGARETTES, UNCOMPLICATED: Chronic | ICD-10-CM

## 2024-07-30 PROBLEM — Z86.0101 HISTORY OF ADENOMATOUS POLYP OF COLON: Status: RESOLVED | Noted: 2023-10-20 | Resolved: 2024-07-30

## 2024-07-30 PROBLEM — Z86.010 HISTORY OF ADENOMATOUS POLYP OF COLON: Status: RESOLVED | Noted: 2023-10-20 | Resolved: 2024-07-30

## 2024-07-30 PROBLEM — Z86.0100 HISTORY OF COLON POLYPS: Chronic | Status: ACTIVE | Noted: 2023-10-20

## 2024-07-30 PROBLEM — Z86.010 HISTORY OF COLON POLYPS: Chronic | Status: ACTIVE | Noted: 2023-10-20

## 2024-07-30 PROBLEM — Z80.0 FAMILY HISTORY OF COLON CANCER: Chronic | Status: ACTIVE | Noted: 2023-10-16

## 2024-07-30 PROBLEM — Z86.0100 HISTORY OF COLON POLYPS: Status: ACTIVE | Noted: 2023-10-20

## 2024-07-30 PROCEDURE — 3074F SYST BP LT 130 MM HG: CPT | Performed by: INTERNAL MEDICINE

## 2024-07-30 PROCEDURE — 1160F RVW MEDS BY RX/DR IN RCRD: CPT | Performed by: INTERNAL MEDICINE

## 2024-07-30 PROCEDURE — 99495 TRANSJ CARE MGMT MOD F2F 14D: CPT | Performed by: INTERNAL MEDICINE

## 2024-07-30 PROCEDURE — 1126F AMNT PAIN NOTED NONE PRSNT: CPT | Performed by: INTERNAL MEDICINE

## 2024-07-30 PROCEDURE — 1159F MED LIST DOCD IN RCRD: CPT | Performed by: INTERNAL MEDICINE

## 2024-07-30 PROCEDURE — 3078F DIAST BP <80 MM HG: CPT | Performed by: INTERNAL MEDICINE

## 2024-07-30 PROCEDURE — 1111F DSCHRG MED/CURRENT MED MERGE: CPT | Performed by: INTERNAL MEDICINE

## 2024-07-30 NOTE — PROGRESS NOTES
07/30/2024    Patient Information  Navarro Bruno                                                                                          [id number]  [place] [address]      1955  [unfilled]  There is no work phone number on file.    Chief Complaint:     Hospital discharge follow-up/transition of care.    History of Present Illness:    Patient with multiple medical problems as outlined below in the assessment and plan including type 2 diabetes presents today for hospital discharge follow-up.  Patient was admitted on July 19, 2024 to [facility] and subsequently discharged July 21, 2024.  He presented to [facility] via EMS for complaints of confusion. Patient arrived via [facility] from [place] (). Per EMS, patient stated he was doing a route and was normal before route, unsure exact LKW. On the way back to [place], patient was confused, had to use GPS which was abnormal for him. Patient reports dizziness, witnessed syncopal and was assisted to ground episode by staff there, no memory of event. Patient denies any current dizziness. Patient having hard time with recall and having repetitive statements. Patient called [employer], started [address] patient underwent extensive workup while in the hospital and had neurology consultation.  At the time of discharge it seems very unlikely that patient had straightforward syncopal episode transient global amnesia seem less likely with such rare occurrence.  CT scan of the head revealed no acute pathology.  CT angiogram of the head and neck without large vessel occlusion or significant stenosis.  MRI of the brain revealed mild changes of ischemic white matter disease, nothing acute.  Echocardiogram was essentially negative.  EEG negative.  Patient was found to be vitamin B12 deficiency and was given B12 intramuscularly and started on oral supplementation at discharge.  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  Also as noted plan for memory care referral at the time of discharge.  Patient was continued on his home medications with the exception of adding the cyanocobalamin orally.    Past medical history reviewed and updated were necessary including health maintenance parameters.  This reveals he is overdue for his wellness visit, colonoscopy, and diabetic eye exam.    Review of Systems   Constitutional: Negative.   HENT: Negative.     Eyes: Negative.    Cardiovascular: Negative.    Respiratory: Negative.     Endocrine: Negative.    Hematologic/Lymphatic: Negative.    Skin: Negative.    Musculoskeletal: Negative.    Gastrointestinal: Negative.    Genitourinary: Negative.    Neurological: Negative.    Psychiatric/Behavioral: Negative.     Allergic/Immunologic: Negative.        Active Problems:    Patient Active Problem List   Diagnosis    Chronic anemia    Benign essential hypertension    Hyperlipidemia    Primary hypothyroidism    Irritable bladder    Nocturia    Type 2 diabetes mellitus with microalbuminuria, without long-term current use of insulin    Vitamin D deficiency    Therapeutic drug monitoring    Routine physical examination    Chronic diarrhea    History of partial colectomy, 3/6/2022--ileocolic resection for stricture.  12/17/2014--ileo-cecal colectomy with primary reanastomosis.  Obstruction secondary to NSAID-induced inflamed ileum.    Gout    Benign prostatic hypertrophy    Diabetic eye exam    Diabetic foot exam    Nicotine dependence, cigarettes, uncomplicated    Medical non-compliance    Overweight (BMI 25.0-29.9)    Microalbuminuria    Intermittent confusion    History of colon polyps    Family history of colon cancer    Syncope and collapse    Vitamin B12 deficiency    Hospital discharge follow-up          Past Medical History:   Diagnosis Date    Benign essential hypertension 04/19/2007 04/19/2007--treatment for hypertension begun.    Benign prostatic hypertrophy 07/25/2016 07/25/2016--patient reports irritable bladder symptoms have resolved and he stopped using the Myrbetriq.  05/11/2015--patient presents with a one-month history of urinary urgency, occasional urge incontinence without dysuria. Patient has nocturia 2-3 times per night. Myrbetric 50 mg, one half by mouth daily initiated.    Chronic anemia 02/04/2015 07/19/2016--hemoglobin slightly low at 13.3.  Hematocrit normal at 42.0.  05/11/2015--patient seen in follow-up and had a recent colonoscopy which was negative. Serum iron studies were normal. Homocystine and methylmalonic acid were normal. No further workup. Recent CBC reveals a slightly low hemoglobin but a normal hematocrit.   02/04/2015--routine CBC reveals a low hemoglobin of 11.5, hematocrit    Chronic diarrhea 04/18/2016 07/25/2016--patient seen in follow-up and reports his diarrhea has improved but not resolved.  He reports that he WelChol did not help but he only took a maximum of 2 pills per day.  04/18/2016--patient with a history of partial colectomy performed in 2014 performed for an inflamed terminal ileum.  Part of the terminal ileum in the cecum removed.  Since that time patient presents with chronic diar    Diabetic eye exam 02/03/2017 02/03/2017--patient reports he has not had a diabetic eye examination.  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  Order given.    Diabetic foot exam 02/03/2017 02/03/2017--diabetic foot examination reveals no ulcers and no pre-ulcerative calluses.  He was left great toe and second toe have been ambulated from a lawnmower accident as a teenager.  Sensation is intact.    Gout 07/15/2010    07/15/2010--patient presented with complaints of left foot pain. Initial diagnosis of gout. Uric acid 8.3.  Treatment begun with allopurinol.    History of echocardiogram 12/14/2014 12/14/2014--echocardiogram reveals left ventricular chamber size is normal. Mild concentric left ventricular hypertrophy. Normal left ventricular systolic function with ejection fraction of 58%. Abnormal left ventricular diastolic function is observed. Left Atrium normal. Right ventricular cavity size is mildly enlarged. The right ventricular global systolic function is normal. Right atrium is nor    History of Hepatitis, infectious Age 18    18 years of age--patient had infectious hepatitis. Exact type unknown.    History of partial colectomy, 3/6/2022--ileocolic resection for stricture.  12/17/2014--ileo-cecal colectomy with primary reanastomosis.  Obstruction secondary to NSAID-induced inflamed ileum. 12/17/2014 March 6, 2022--robotic assisted laparoscopic converted to open ileocolic resection for small bowel stricture.  12/17/2014--patient presented with a small bowel obstruction secondary to inflamed terminal ileum. He underwent ileo-cecal colectomy with primary reanastomosis.    History of small bowel obstruction 12/15/2014    01/19/2015--patient seen in follow-up and is doing well. No change in current medical regimen at the present time. Set up fasting lab and follow up. I explained to patient that we will need to monitor him for the development of vitamin B12 deficiency. He will certainly be at risk for this.   12/17/2014--patient presented with a small bowel obstruction secondary to inflamed terminal ileum. He under    Hyperlipidemia 06/13/2007 06/13/2007--treatment for hyperlipidemia begun.    Hypothyroidism 05/08/2015 05/19/2017--TSH elevated at 5.33.  Free T4 and free T3 are normal.  It appears the patient truly has hypothyroidism.  Levothyroxine 50 µg per day initiated.  01/27/2016--repeat thyroid function tests normal.  11/11/2015--TSH elevated at 5.27. Free T4 low normal at 0.92. TPO antibodies less than 6. Repeat  thyroid function tests ordered.  05/08/2015--TSH mildly elevated at 4.5. Free T4 low normal at    Irritable bladder 05/11/2016 02/03/2017--patient reports that he is irritable bladder symptoms have returned but they seem to be intermittent.  He describes urgency and frequency.  However, he was diabetes is not under optimal control and could be playing a role.  His A1c is less than 8 at the present time.  In the past Myrbetriq seemed to be helpful and I will give patient samples to have at the time that he needs it.  He is    Medical non-compliance 05/02/2019    Microalbuminuria 08/03/2021    August 3, 2021--routine follow-up.  Patient is diabetic and his urine microalbumin/creatinine ratio returned elevated at 129.4.  May need low-dose ACE inhibitor.    Nicotine dependence, cigarettes, uncomplicated 05/02/2019    Nocturia 03/29/2016 02/03/2017--patient seen in follow-up and continues to have nocturia 2-3 times per night.  He has irritable bladder symptoms consisting of urgency and occasional incontinence has returned as well.  Myrbetriq samples given and patient can use that as needed.  07/25/2016--patient reports irritable bladder symptoms have resolved and he stopped using the Myrbetriq.  05/11/2015--patient presents with a    Overweight (BMI 25.0-29.9) 08/03/2021    Stress incontinence     Type 2 diabetes mellitus with microalbuminuria, without long-term current use of insulin 03/09/2007 03/09/2007--patient presented with polyuria and polydipsia. Initial diagnosis of diabetes. Serum glucose 252, initial hemoglobin A1c 10.4.    Vitamin D deficiency 04/12/2016         Past Surgical History:   Procedure Laterality Date    AMPUTATION FOOT / TOE  18 years old    18 years of age--left great toe and left second toe amputation from a lawnmower accident.    APPENDECTOMY  11 years old    11 years of age.    CATARACT EXTRACTION Bilateral 2013 2013--patient reports he had bilateral cataract surgery about 4 years  ago.  Intraocular lenses placed.    COLECTOMY PARTIAL / TOTAL  12/17/2014 12/17/2014--patient presented with a small bowel obstruction secondary to inflamed terminal ileum. He underwent ileo-cecal colectomy with primary reanastomosis.    COLECTOMY PARTIAL / TOTAL N/A 03/06/2022 March 6, 2022--robotic assisted laparoscopic converted to open ileocolic resection for small bowel stricture.    COLON RESECTION N/A 03/02/2022    Procedure: Robotic assisted laparoscopic to Open ileocolic resection with anastomosis;  Surgeon: Guille Cedeno MD;  Location: [facility];  Service: Robotics - DaVinci;  Laterality: N/A;    COLONOSCOPY  04/06/2015 04/06/2015--colonoscopy revealed small internal hemorrhoids. Well healed ileocolic anastomosis. Fair prep.    COLONOSCOPY N/A 08/17/2021 August 17, 2021--colonoscopy revealed the ileal surgical anastomosis contained a benign-appearing intrinsic moderate stenosis that was nontraversed.  Biopsied.  Nonbleeding internal hemorrhoids.  Grade 1.  Pathology revealed acute ileitis with ulcer, stricture formation and associated serositis proximal to the anastomosis.  Intact anastomosis with focal suture granuloma.  13 benign lymph nodes.    EXPLORATORY LAPAROTOMY  12/17/2014 12/17/2014--patient presented with a small bowel obstruction secondary to inflamed terminal ileum. He underwent ileo-cecal colectomy with primary reanastomosis.         No Known Allergies        Current Outpatient Medications:     allopurinol (ZYLOPRIM) 300 MG tablet, TAKE 1 TABLET BY MOUTH EVERY DAY, Disp: 90 tablet, Rfl: 1    amLODIPine (NORVASC) 2.5 MG tablet, TAKE 1 TABLET BY MOUTH IN THE MORNING FOR HIGH BLOOD PRESSURE, Disp: 30 tablet, Rfl: 0    atorvastatin (LIPITOR) 80 MG tablet, TAKE 1 TABLET BY MOUTH EVERY DAY FOR CHOLESTEROL, Disp: 90 tablet, Rfl: 0    B-D ULTRAFINE III SHORT PEN 31G X 8 MM misc, Use with Victoza injections daily., Disp: , Rfl: 3    Bioflavonoid Products (SUPER C-500  PO), Take 500 mg by mouth Daily., Disp: , Rfl:     Calcium Citrate-Vitamin D (CALCIUM CITRATE + D3 MAXIMUM PO), Take 1 tablet by mouth Daily., Disp: , Rfl:     levothyroxine (SYNTHROID, LEVOTHROID) 50 MCG tablet, TAKE 1 TABLET BY MOUTH EVERY DAY FOR LOW THYROID, Disp: 90 tablet, Rfl: 2    metoprolol tartrate (LOPRESSOR) 25 MG tablet, TAKE 1 TABLET BY MOUTH 2 TIMES A DAY FOR HIGH BLOOD PRESSURE, Disp: 60 tablet, Rfl: 0    Probiotic Product (PROBIOTIC PO), Take 1 tablet by mouth Daily., Disp: , Rfl:     Semaglutide (Rybelsus) 7 MG tablet, Take 7 mg by mouth Daily., Disp: 90 tablet, Rfl: 1    SITagliptin-metFORMIN HCl ER (Janumet XR)  MG tablet, Take two tablets by mouth daily for diabetes., Disp: 60 tablet, Rfl: 4    vitamin D3 125 MCG (5000 UT) capsule capsule, Take 1 capsule by mouth Daily., Disp: , Rfl:     Cyanocobalamin 1000 MCG sublingual tablet, Dissolve 2 tablets under the tongue every day as directed indefinitely, Disp: , Rfl:       Family History   Problem Relation Age of Onset    Scoliosis Mother         Amyotrophic Lateral Sclerosis    Stroke Father         Father had multiple strokes.    Diabetes Father         Type 2    Hypertension Father     Diabetes Paternal Grandfather         Type 2    Malig Hyperthermia Neg Hx          Social History     Socioeconomic History    Marital status:     Years of education: 14   Tobacco Use    Smoking status: Every Day     Current packs/day: 1.00     Types: Cigarettes    Smokeless tobacco: Never    Tobacco comments:     STARTED IN HIGH SCHOOL OFF AND ON, QUIT FOR 10 YEARS, STARTED AGAIN 9 YEARS AGO   Vaping Use    Vaping status: Never Used   Substance and Sexual Activity    Alcohol use: Not Currently    Drug use: Not Currently     Types: Marijuana     Comment: Quit 20 years ago.     Sexual activity: Defer         Vitals:    07/30/24 1305   BP: 126/72   BP Location: Left arm   Patient Position: Sitting   Cuff Size: Adult   Pulse: 60   Resp: 14   SpO2: 98%  "  Weight: 97.5 kg (215 lb)   Height: 190.5 cm (75\")        Body mass index is 26.87 kg/m².      Physical Exam:    General: Alert and oriented x 3.  No acute distress.  Mildly overweight.  Normal affect.  HEENT: Pupils equal, round, reactive to light; extraocular movements intact; sclerae nonicteric; pharynx, ear canals and TMs normal.  Neck: Without JVD, thyromegaly, bruit, or adenopathy.  Lungs: Clear to auscultation in all fields.  Heart: Regular rate and rhythm without murmur, rub, gallop, or click.  Abdomen: Soft, nontender, without hepatosplenomegaly or hernia.  Bowel sounds normal.  : Deferred.  Rectal: Deferred.  Extremities: Without clubbing, cyanosis, edema, or pulse deficit.  Neurologic: Intact without focal deficit.  Normal station and gait observed during ingress and egress from the examination room.  Skin: Without significant lesion.  Musculoskeletal: Unremarkable.    Lab/other results:    I reviewed the documentation from the hospital stay including emergency room documentation, history and physical, hospital course, neurology consultation, laboratory and radiographic studies, discharge summary and discharge medications and instructions.    Assessment/Plan:    Current outpatient and discharge medications have been reconciled for the patient.  Reviewed by: Carlos Chavira MD        Diagnosis Plan   1. Hospital discharge follow-up        2. Syncope and collapse        3. Intermittent confusion  Ambulatory Referral to Neurology      4. Vitamin B12 deficiency  Cyanocobalamin 1000 MCG sublingual tablet      5. Benign essential hypertension        6. Chronic anemia        7. Type 2 diabetes mellitus with microalbuminuria, without long-term current use of insulin        8. Primary hypothyroidism        9. Microalbuminuria        10. Hyperlipidemia        11. Gout        12. Vitamin D deficiency        13. Nicotine dependence, cigarettes, uncomplicated        14. Medical non-compliance        15. Irritable " bladder        16. Benign prostatic hypertrophy        17. Therapeutic drug monitoring          Patient seen in hospital discharge follow-up for intermittent confusion/mental status changes.  It does not appear that he had a syncopal episode or even a near syncopal episode.  Extensive workup was negative.  Patient has had similar episodes in the past.  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  I cannot clear him to drive at this time.  In regards to his intermittent confusion, if these episodes continue patient will need to follow-up with a neurologist.  It appears he does have a vitamin B12 deficiency and will need to take vitamin B12 supplementation indefinitely.    Plan is as follows: No change in chronic medicine other than cyanocobalamin.  We will schedule a subsequent Medicare wellness visit sometime in the near future.  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            Procedures

## 2024-08-01 ENCOUNTER — TELEPHONE (OUTPATIENT)
Dept: INTERNAL MEDICINE | Facility: CLINIC | Age: 69
End: 2024-08-01

## 2024-08-01 NOTE — TELEPHONE ENCOUNTER
I called pt's sister and explained to her what paper work we need in order to share information with her.

## 2024-08-01 NOTE — TELEPHONE ENCOUNTER
Caller: LANA LACY    Relationship to patient: Brother/Sister    Best call back number:     Patient is needing: LANA IS CALLING TODAY REQUESTING A CALLBACK TO CONFIRM THAT THE OFFICE RECEIVED THE FAX SHE SENT FOR THE UPDATED LEGAL PAPERWORK OF LIVING WILL, POA, AND DNR THIS WEEK.    PLEASE ADVISE LANA.       LANA ALSO STATES THAT THE PATIENT WAS REFERRED TO A NEUROLOGIST AND SHE WOULD LIKE TO HAVE THAT CONTACT INFORMATION.       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   PLEASE ADVISE ON ALL.

## 2024-08-01 NOTE — TELEPHONE ENCOUNTER
The POA is financial ONLY, not for healthcare and this person is not listed on patient's verbal release.

## 2024-08-13 ENCOUNTER — OFFICE VISIT (OUTPATIENT)
Dept: NEUROLOGY | Facility: CLINIC | Age: 69
End: 2024-08-13
Payer: MEDICARE

## 2024-08-13 VITALS
BODY MASS INDEX: 26.86 KG/M2 | DIASTOLIC BLOOD PRESSURE: 84 MMHG | HEIGHT: 75 IN | SYSTOLIC BLOOD PRESSURE: 148 MMHG | OXYGEN SATURATION: 98 % | HEART RATE: 57 BPM | WEIGHT: 216 LBS

## 2024-08-13 DIAGNOSIS — R41.3 MEMORY LOSS: ICD-10-CM

## 2024-08-13 DIAGNOSIS — E53.8 VITAMIN B12 DEFICIENCY: ICD-10-CM

## 2024-08-13 DIAGNOSIS — R41.0 INTERMITTENT CONFUSION: Primary | ICD-10-CM

## 2024-08-13 DIAGNOSIS — Z87.898 HISTORY OF PALPITATIONS: ICD-10-CM

## 2024-08-13 RX ORDER — CYANOCOBALAMIN 1000 UG/ML
1000 INJECTION, SOLUTION INTRAMUSCULAR; SUBCUTANEOUS SEE ADMIN INSTRUCTIONS
Status: SHIPPED | OUTPATIENT
Start: 2024-08-13

## 2024-08-13 RX ADMIN — CYANOCOBALAMIN 1000 MCG: 1000 INJECTION, SOLUTION INTRAMUSCULAR; SUBCUTANEOUS at 12:44

## 2024-08-13 NOTE — PROGRESS NOTES
[facility] NEUROLOGY         Date of Visit: 2024    Name: Navarro Bruno    :  1955    PCP: Carlos Chavira MD    Visit Type: follow-up         Subjective     Patient ID: Navarro is a 69 y.o. male.         History of Present Illness  I have had the pleasure of seeing your patient for the first time today.  As you may know he is a 68-year-old male here today for follow up for episodes of intermittent confusion.  He was referred by his primary care doctor Dr. Chavira.    History:    Patient does have a history of type 2 diabetes mellitus, hypertension, hyperlipidemia.    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 See office note from 2023 for additional historical information.     Patient did end up having MRI of the brain.  MRI revealed:    MRI Brain With & Without Contrast completed on 2023 revealed small vessel disease as well as  small old lacunar infarct. Subsequent CTA head and neck revealed no significant vascular disease or stenosis. We recommended baby aspirin for stroke prevention and also completed EEG. EEG showed some mild bilateral frontal slowing with no additional abnormalities. Patient did not have any additional episodes.    Current:    Patient presents today after an additional episode of confusion and syncope. This occurred on 2024. Patient was admitted for workup including repeat brain scan with no additional abnormalities and additional EEG with no abnormalities. Patient was back to baseline at admission but had no recollection of episode. Patient was advised to follow 90 day driving restrictions and follow up outpatient for additional neurological workup. Patient was also noted to have a vitamin b12 level in 100's and was discharged on oral supplementation.     Patient states he has not had any additional  "episodes of confusion. He states that the episode he saw us for the in the past was \"a GPS problem not a memory problem.\" However at time of last evaluation patient acknowledge confusion. Patient lives alone. He is not currently driving. He denies any other losses of conciousness. No other new neurological complaints at today's appointment.         The following portions of the patient's history were reviewed and updated as appropriate: allergies, current medications, past family history, past medical history, past social history, past surgical history, and problem list.                 Review of Systems   Constitutional:  Negative for activity change, appetite change and unexpected weight change.   HENT:  Negative for hearing loss and trouble swallowing.    Eyes:  Negative for visual disturbance.   Respiratory:  Negative for chest tightness and shortness of breath.    Cardiovascular:  Negative for palpitations.   Gastrointestinal:  Negative for constipation and diarrhea.   Genitourinary:  Negative for decreased urine volume, difficulty urinating and frequency.   Musculoskeletal:  Negative for gait problem.   Neurological:  Negative for tremors, syncope, facial asymmetry, speech difficulty and light-headedness.   Psychiatric/Behavioral:  Positive for confusion. Negative for agitation, behavioral problems, dysphoric mood, hallucinations and sleep disturbance. The patient is not nervous/anxious.             Current Medications:    Current Outpatient Medications   Medication Instructions    allopurinol (ZYLOPRIM) 300 MG tablet TAKE 1 TABLET BY MOUTH EVERY DAY    amLODIPine (NORVASC) 2.5 MG tablet TAKE 1 TABLET BY MOUTH IN THE MORNING FOR HIGH BLOOD PRESSURE    atorvastatin (LIPITOR) 80 MG tablet TAKE 1 TABLET BY MOUTH EVERY DAY FOR CHOLESTEROL    B-D ULTRAFINE III SHORT PEN 31G X 8 MM misc Use with Victoza injections daily.    Bioflavonoid Products (SUPER C-500 PO) 500 mg, Oral, Daily    Calcium Citrate-Vitamin D " "(CALCIUM CITRATE + D3 MAXIMUM PO) 1 tablet, Oral, Daily    levothyroxine (SYNTHROID, LEVOTHROID) 50 MCG tablet TAKE 1 TABLET BY MOUTH EVERY DAY FOR LOW THYROID    metoprolol tartrate (LOPRESSOR) 25 MG tablet TAKE 1 TABLET BY MOUTH 2 TIMES A DAY FOR HIGH BLOOD PRESSURE    Probiotic Product (PROBIOTIC PO) 1 tablet, Oral, Daily    Rybelsus 7 mg, Oral, Daily    SITagliptin-metFORMIN HCl ER (Janumet XR)  MG tablet Take two tablets by mouth daily for diabetes.    vitamin D3 5,000 Units, Oral, Daily          /84   Pulse 57   Ht 190 cm (74.8\")   Wt 98 kg (216 lb)   SpO2 98%   BMI 27.14 kg/m²                Objective     Neurological Exam  Mental Status  Awake and alert. Oriented only to person, place, time and situation. Recalls 3 of 3 objects immediately. At 15 minutes recalls 0 of 3 elements. Recalls 0 of 3 objects with prompting. Able to copy figure. Speech is normal. Language is fluent with no aphasia. Attention and concentration are normal. MMSE score: 27.    Cranial Nerves  CN II: Visual fields full to confrontation.  CN III, IV, VI: Extraocular movements intact bilaterally. Normal lids and orbits bilaterally. Pupils equal round and reactive to light bilaterally.  CN V: Facial sensation is normal.  CN VII: Full and symmetric facial movement.  CN IX, X: Palate elevates symmetrically  CN XI: Shoulder shrug strength is normal.  CN XII: Tongue midline without atrophy or fasciculations.    Motor  Normal muscle bulk throughout. Normal muscle tone. No abnormal involuntary movements. Strength is 5/5 throughout all four extremities.    Sensory  Sensation is intact to light touch, pinprick, vibration and proprioception in all four extremities.    Reflexes  Deep tendon reflexes are 2+ and symmetric in all four extremities.    Coordination    Finger-to-nose, rapid alternating movements and heel-to-shin normal bilaterally without dysmetria.    Gait  Normal casual, toe, heel and tandem gait.      Physical " Exam  Constitutional:       General: He is awake.      Appearance: Normal appearance. He is normal weight.   HENT:      Head: Normocephalic.   Eyes:      General: Lids are normal.      Extraocular Movements: Extraocular movements intact.      Pupils: Pupils are equal, round, and reactive to light.   Pulmonary:      Effort: Pulmonary effort is normal.   Musculoskeletal:         General: Normal range of motion.   Skin:     General: Skin is warm.   Neurological:      Mental Status: He is alert.      Motor: Motor strength is normal.     Coordination: Coordination is intact.      Deep Tendon Reflexes: Reflexes are normal and symmetric.   Psychiatric:         Mood and Affect: Mood normal.         Speech: Speech normal.         Behavior: Behavior normal.         Thought Content: Thought content normal.                     Assessment & Plan     Diagnoses and all orders for this visit:    1. Intermittent confusion (Primary)  -     Ambulatory Referral to Neuropsychology  -     Holter Monitor - 72 Hour Up To 15 Days; Future    2. Memory loss  -     Ambulatory Referral to Neuropsychology  -     Holter Monitor - 72 Hour Up To 15 Days; Future    3. Vitamin B12 deficiency  -     cyanocobalamin injection 1,000 mcg    4. History of palpitations  -     Holter Monitor - 72 Hour Up To 15 Days; Future         At this time I would like to order neuropsych test to evaluate for possible neurocoginitive disorder. I gave him paper to call [facility] to schedule.     We will also order holter monitor to rule out arrhythmia as possible cause for confusion.    I would like to switch him to b12 injections for the severe b12 deficiency.     Follow up in 3 months or sooner if needed.            Betsy BARAHONA    Neurology    [facility] [place]    Phone: [phone]    8/13/2024 , 20:34 EDT

## 2024-08-20 ENCOUNTER — CLINICAL SUPPORT (OUTPATIENT)
Dept: NEUROLOGY | Facility: CLINIC | Age: 69
End: 2024-08-20
Payer: MEDICARE

## 2024-08-20 DIAGNOSIS — E53.8 VITAMIN B12 DEFICIENCY: Primary | ICD-10-CM

## 2024-08-20 RX ORDER — CYANOCOBALAMIN 1000 UG/ML
1000 INJECTION, SOLUTION INTRAMUSCULAR; SUBCUTANEOUS
Status: SHIPPED | OUTPATIENT
Start: 2024-08-20

## 2024-08-20 RX ADMIN — CYANOCOBALAMIN 1000 MCG: 1000 INJECTION, SOLUTION INTRAMUSCULAR; SUBCUTANEOUS at 09:43

## 2024-08-26 PROBLEM — Z86.010 HISTORY OF ADENOMATOUS POLYP OF COLON: Status: ACTIVE | Noted: 2023-10-16

## 2024-08-26 PROBLEM — Z86.0101 HISTORY OF ADENOMATOUS POLYP OF COLON: Status: ACTIVE | Noted: 2023-10-16

## 2024-08-27 ENCOUNTER — CLINICAL SUPPORT (OUTPATIENT)
Dept: NEUROLOGY | Facility: CLINIC | Age: 69
End: 2024-08-27
Payer: MEDICARE

## 2024-08-27 DIAGNOSIS — E53.8 VITAMIN B12 DEFICIENCY: Primary | ICD-10-CM

## 2024-08-27 DIAGNOSIS — I10 BENIGN ESSENTIAL HYPERTENSION: Chronic | ICD-10-CM

## 2024-08-27 DIAGNOSIS — E78.2 MIXED HYPERLIPIDEMIA: ICD-10-CM

## 2024-08-27 RX ORDER — CYANOCOBALAMIN 1000 UG/ML
1000 INJECTION, SOLUTION INTRAMUSCULAR; SUBCUTANEOUS
Status: SHIPPED | OUTPATIENT
Start: 2024-08-27

## 2024-08-27 RX ADMIN — CYANOCOBALAMIN 1000 MCG: 1000 INJECTION, SOLUTION INTRAMUSCULAR; SUBCUTANEOUS at 09:47

## 2024-08-28 RX ORDER — ATORVASTATIN CALCIUM 80 MG/1
TABLET, FILM COATED ORAL
Qty: 90 TABLET | Refills: 0 | Status: SHIPPED | OUTPATIENT
Start: 2024-08-28

## 2024-08-28 RX ORDER — METOPROLOL TARTRATE 25 MG/1
TABLET, FILM COATED ORAL
Qty: 60 TABLET | Refills: 0 | Status: SHIPPED | OUTPATIENT
Start: 2024-08-28

## 2024-09-03 ENCOUNTER — CLINICAL SUPPORT (OUTPATIENT)
Dept: NEUROLOGY | Facility: CLINIC | Age: 69
End: 2024-09-03
Payer: MEDICARE

## 2024-09-03 DIAGNOSIS — E53.8 VITAMIN B12 DEFICIENCY: Primary | ICD-10-CM

## 2024-09-03 DIAGNOSIS — E11.9 TYPE 2 DIABETES MELLITUS WITHOUT COMPLICATION: ICD-10-CM

## 2024-09-03 RX ORDER — SITAGLIPTIN AND METFORMIN HYDROCHLORIDE 1000; 50 MG/1; MG/1
TABLET, FILM COATED, EXTENDED RELEASE ORAL
Qty: 90 TABLET | Refills: 3 | Status: SHIPPED | OUTPATIENT
Start: 2024-09-03

## 2024-09-03 RX ORDER — CYANOCOBALAMIN 1000 UG/ML
1000 INJECTION, SOLUTION INTRAMUSCULAR; SUBCUTANEOUS
Status: SHIPPED | OUTPATIENT
Start: 2024-09-03

## 2024-09-03 RX ADMIN — CYANOCOBALAMIN 1000 MCG: 1000 INJECTION, SOLUTION INTRAMUSCULAR; SUBCUTANEOUS at 10:19

## 2024-09-04 ENCOUNTER — EXTERNAL PBMM DATA (OUTPATIENT)
Dept: PHARMACY | Facility: OTHER | Age: 69
End: 2024-09-04
Payer: MEDICARE

## 2024-09-12 ENCOUNTER — TELEPHONE (OUTPATIENT)
Dept: NEUROLOGY | Facility: CLINIC | Age: 69
End: 2024-09-12
Payer: MEDICARE

## 2024-09-12 NOTE — TELEPHONE ENCOUNTER
Pt called in, spoke to pt and rescheduled his b12 appt. He advised his appt with [facility] is on 9/17. Advised we will move follow up appt scheduled in nov if needed after appt on 9/17 is completed. Confirmed and changed b12 to 9/18.

## 2024-09-18 ENCOUNTER — CLINICAL SUPPORT (OUTPATIENT)
Dept: NEUROLOGY | Facility: CLINIC | Age: 69
End: 2024-09-18
Payer: MEDICARE

## 2024-09-18 DIAGNOSIS — E53.8 VITAMIN B12 DEFICIENCY: Primary | ICD-10-CM

## 2024-09-18 RX ORDER — CYANOCOBALAMIN 1000 UG/ML
1000 INJECTION, SOLUTION INTRAMUSCULAR; SUBCUTANEOUS
Status: SHIPPED | OUTPATIENT
Start: 2024-09-18

## 2024-09-18 RX ADMIN — CYANOCOBALAMIN 1000 MCG: 1000 INJECTION, SOLUTION INTRAMUSCULAR; SUBCUTANEOUS at 09:19

## 2024-10-16 DIAGNOSIS — I10 BENIGN ESSENTIAL HYPERTENSION: Chronic | ICD-10-CM

## 2024-10-16 RX ORDER — AMLODIPINE BESYLATE 2.5 MG/1
TABLET ORAL
Qty: 30 TABLET | Refills: 0 | Status: SHIPPED | OUTPATIENT
Start: 2024-10-16

## 2024-10-18 ENCOUNTER — TELEPHONE (OUTPATIENT)
Dept: INTERNAL MEDICINE | Facility: CLINIC | Age: 69
End: 2024-10-18

## 2024-10-18 NOTE — TELEPHONE ENCOUNTER
Caller: Navarro Bruno    Relationship: Self    Best call back number: [phone]     What does billing need from the patient: PATIENT WAS SEEN ON 07/30/24 08/13/24 AND 08/20/24. MEDICARE DID NOT COVER THOSE BILLS. PATIENT TALKED TO MEDICARE AND THEY SUGGESTED  HAVING OFFICE RESUBMIT THOSE TO INSURANCE. PLEASE CALL PATIENT IF ANY INFORMATION IS NEEDED

## 2024-10-25 ENCOUNTER — TELEPHONE (OUTPATIENT)
Dept: INTERNAL MEDICINE | Facility: CLINIC | Age: 69
End: 2024-10-25
Payer: MEDICARE

## 2024-10-25 NOTE — TELEPHONE ENCOUNTER
Caller: Navarro Bruno    Relationship to patient: Self    Best call back number:     Patient is needing: PATIENT STATES THAT HE IS IN THE MEDICARE DONUT HOLE AND HIS JANUMET AND RYBELSUS WILL COST HIM $500 WITH [facility].    PATIENT WOULD LIKE TO KNOW IF THE OFFICE HAS SAMPLES OF THESE 2 MEDICATIONS, AND ALSO, IF THERE ARE ALTERNATIVE MEDICATIONS FOR THESE 2 MEDICATIONS.    PLEASE LET THE PATIENT KNOW ABOUT SAMPLES AND ALTERNATIVE MEDICATIONS.

## 2024-11-11 ENCOUNTER — TELEPHONE (OUTPATIENT)
Dept: NEUROLOGY | Facility: CLINIC | Age: 69
End: 2024-11-11
Payer: MEDICARE

## 2024-11-11 NOTE — TELEPHONE ENCOUNTER
Left message for the patient to return our call. Patient is scheduled for a 3 month follow up tomorrow 11/12/2024. Patient needed to have Neuropysch testing completed. Left message for the patient to return our call to see if they had this done. If not the appointment needs to be rescheduled. HUB ok to reschedule.

## 2024-11-12 NOTE — TELEPHONE ENCOUNTER
Patient informed we have a $10 co pay card for the Rybelsus and a month supply of Janumet at the  for him to .

## 2024-12-03 ENCOUNTER — TELEPHONE (OUTPATIENT)
Dept: NEUROLOGY | Facility: CLINIC | Age: 69
End: 2024-12-03
Payer: MEDICARE

## 2024-12-03 NOTE — TELEPHONE ENCOUNTER
SCHEDULING REQUEST    Caller: Navarro Bruno    Relationship to patient: Self    Best call back number: [phone]    Chief complaint: PT NEEDING TO RESCHEDULE MEMORY F/U APPT W/ [facility] FOR 2-3 WEEKS AFTER HIS NEUROPSYCH EVAL. PT SCHEDULED FOR NEUROPSYCH EVAL @ [facility] ON 1/16/2025 @ 10AM.    Type of visit: MEMORY-RELATED FOLLOW UP    If rescheduling, when is the original appointment: 12/4/2024    Requested date: NEXT AVAILABLE     Additional notes: [facility] NOT CURRENTLY SCHEDULING MEMORY RELATED APPT W/ [facility] DUE TO APPT DURATION REQUIREMENTS.    **PT SWITCHING TO [employer] MEDICARE AS OF 1/1/2025. WILL PROVIDE COVERAGE DETAILS AT LATER TIME.    PLEASE REVIEW AND ADVISE.

## 2024-12-03 NOTE — TELEPHONE ENCOUNTER
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 TO FIREARMS:  Firearms are kept locked up (I.e. safe, gun cabinet, gun case, etc.) and unloaded. Grandfather's gun. He reports he would not commit suicide, he knows his children need him    MENTAL STATUS EXAM:  Appearance:  at his parents house, good eye contact, appears stated age.   Behavior:  Calmed, pleasant, interactive.   Gait:  Normal.    Cooperativity:  Cooperative, forthcoming, appears reliable.    Speech:  Normal rate, tone and volume.   Language:  No abnormality noted.    Mood:  Noted in History of Present Illness.  Affect:  Mood-congruent, stable, normal range.  Thought Process:  Linear, logical, goal-directed.    Thought Content:  No overt delusions or abnormality noted.    Perception:  No hallucinations, not responding to internal stimuli.   Consciousness:  Awake and alert.   Orientation:  Oriented to person, place and time.   Musculoskeletal: No abnormal or involuntary muscle movements observed.  Memory:  Good, able to demonstrate accurate historical recall for recent and more remote events and discussions.  Attention:  fair, no fluctuation or obvious deficit noted and able to follow the conversation.   Fund of Knowledge:  Consistent with education and experiences as evidenced by  vocabulary.   Insight:  fair, based on appropriate recognition of impact of psychiatric symptoms and need for treatment. Chronic financial stressors and stress related to raising his 3 young sons,   Judgment:  Good, based on recent decisions.  Safety:  Noted in History of Present Illness.  Motivation to pursue treatment:  Good.      DSM FORMULATION:  Posttraumatic Stress Disorder, chronic (F43.12)   Generalized Anxiety Disorder (F41.1)  Mood disorder  ADHD, combined type (90.2)      MEDICAL HISTORY:  Past Medical History:   Diagnosis Date    Allergy     Asthma, well controlled (CMD)     Chronic diarrhea     Left upper quadrant pain     Left wrist pain     Nightmares associated with chronic post-traumatic stress disorder     Post concussion syndrome     PTSD (post-traumatic stress disorder)     RAD (reactive airway disease) (CMD)     Seasonal allergies          IMPRESSION AND PLAN:  Khadar Yeung presents today for a psychiatric evaluation.  Upon collaboration with Mr. Yeung, treatment goals will be to improve concentration, depression and anxiety.    Medication Recommendations:  Khadar was seen today for video visit.    Diagnoses and all orders for this visit:    Chronic post-traumatic stress disorder (PTSD)    Generalized anxiety disorder    Mood disorder (CMD)  -     OXcarbazepine (TRILEPTAL) 300 MG tablet; Take 1 tablet by mouth in the morning and 1 tablet in the evening.  -     SERVICE TO BEHAVIORAL HEALTH    Attention deficit hyperactivity disorder (ADHD), combined type  -     lisdexamfetamine (Vyvanse) 50 MG capsule; Take 1 capsule by mouth every morning.  -     atomoxetine (STRATTERA) 60 MG capsule; Take 1 capsule by mouth daily.  -     SERVICE TO BEHAVIORAL HEALTH        Continue oxcarbazepine 300mg, take 1 tab in the morning and 1 tab nightly for mood  Continue hydroxyzine 25mg 3x daily as needed for anxiety, ok to take 2 tabs at night  Continue Vyvanse 50mg for ADHD  Continue  atomoxetine  60mg daily  for ADHD, he takes when he does not take Vyvanse      PDMP reviewed; no aberrant behavior identified, prescription authorized. Vyvanse 50mg #30 last dispensed 8/27/2024, Vyvanse 50mg faxed today     Discussed recommended medications, treatment alternative options, risks, benefits and side effects. Plan B may be to try alternative medication for ADHD if Vyvanse is not tolerated.     Labs, Procedures, Consults Ordered:  none    Patient will follow up with writer in 8 week(s).  Encouraged to call the office with any questions, comments, concerns or to reschedule an earlier appointment with writer.    Khadar will follow up with individual therapy with Maria Isabel Blackburn, last visit was 5/16/2024, new referral placed on 9/30/2024    APPOINTMENT DURATION:  30 minutes.     PATIENT EDUCATION:  Ready to learn, no apparent learning barriers were identified.  Explained diagnosis and treatment plan; patient expressed understanding of the content.  Patient verbally provided treatment consent, medication consent and treatment plan.      Demetria Stoddard PMHNP, APRN

## 2024-12-11 DIAGNOSIS — I10 BENIGN ESSENTIAL HYPERTENSION: Chronic | ICD-10-CM

## 2024-12-11 DIAGNOSIS — E78.2 MIXED HYPERLIPIDEMIA: ICD-10-CM

## 2024-12-11 NOTE — TELEPHONE ENCOUNTER
Rx Refill Note  Requested Prescriptions     Pending Prescriptions Disp Refills    amLODIPine (NORVASC) 2.5 MG tablet [Pharmacy Med Name: amLODIPine Besylate Oral Tablet 2.5 MG] 30 tablet 0     Sig: TAKE 1 TABLET BY MOUTH IN THE MORNING FOR BLOOD PRESSURE    atorvastatin (LIPITOR) 80 MG tablet [Pharmacy Med Name: Atorvastatin Calcium Oral Tablet 80 MG] 90 tablet 0     Sig: TAKE 1 TABLET BY MOUTH EVERY DAY FOR CHOLESTEROL    metoprolol tartrate (LOPRESSOR) 25 MG tablet [Pharmacy Med Name: Metoprolol Tartrate Oral Tablet 25 MG] 60 tablet 0     Sig: TAKE 1 TABLET BY MOUTH 2 TIMES A DAY      Last office visit with prescribing clinician: 7/30/2024   Last telemedicine visit with prescribing clinician: Visit date not found   Next office visit with prescribing clinician: 12/20/2024       Holden Jimenez MA  12/11/24, 14:41 EST

## 2024-12-12 RX ORDER — METOPROLOL TARTRATE 25 MG/1
TABLET, FILM COATED ORAL
Qty: 60 TABLET | Refills: 0 | Status: SHIPPED | OUTPATIENT
Start: 2024-12-12

## 2024-12-12 RX ORDER — ATORVASTATIN CALCIUM 80 MG/1
TABLET, FILM COATED ORAL
Qty: 90 TABLET | Refills: 0 | Status: SHIPPED | OUTPATIENT
Start: 2024-12-12

## 2024-12-12 RX ORDER — AMLODIPINE BESYLATE 2.5 MG/1
TABLET ORAL
Qty: 30 TABLET | Refills: 0 | Status: SHIPPED | OUTPATIENT
Start: 2024-12-12

## 2024-12-20 ENCOUNTER — OFFICE VISIT (OUTPATIENT)
Dept: INTERNAL MEDICINE | Facility: CLINIC | Age: 69
End: 2024-12-20
Payer: MEDICARE

## 2024-12-20 ENCOUNTER — LAB (OUTPATIENT)
Dept: LAB | Facility: HOSPITAL | Age: 69
End: 2024-12-20
Payer: MEDICARE

## 2024-12-20 VITALS
OXYGEN SATURATION: 98 % | BODY MASS INDEX: 28.35 KG/M2 | HEART RATE: 76 BPM | RESPIRATION RATE: 16 BRPM | SYSTOLIC BLOOD PRESSURE: 136 MMHG | TEMPERATURE: 98 F | HEIGHT: 75 IN | DIASTOLIC BLOOD PRESSURE: 82 MMHG | WEIGHT: 228 LBS

## 2024-12-20 DIAGNOSIS — E03.9 PRIMARY HYPOTHYROIDISM: Chronic | ICD-10-CM

## 2024-12-20 DIAGNOSIS — Z87.39 HISTORY OF GOUT: Chronic | ICD-10-CM

## 2024-12-20 DIAGNOSIS — Z86.0100 HISTORY OF COLON POLYPS: Chronic | ICD-10-CM

## 2024-12-20 DIAGNOSIS — N32.89 IRRITABLE BLADDER: Chronic | ICD-10-CM

## 2024-12-20 DIAGNOSIS — Z00.00 ENCOUNTER FOR SUBSEQUENT ANNUAL WELLNESS VISIT (AWV) IN MEDICARE PATIENT: Primary | ICD-10-CM

## 2024-12-20 DIAGNOSIS — D64.9 CHRONIC ANEMIA: Chronic | ICD-10-CM

## 2024-12-20 DIAGNOSIS — R80.9 MICROALBUMINURIA: Chronic | ICD-10-CM

## 2024-12-20 DIAGNOSIS — Z23 NEED FOR INFLUENZA VACCINATION: ICD-10-CM

## 2024-12-20 DIAGNOSIS — E11.29 TYPE 2 DIABETES MELLITUS WITH MICROALBUMINURIA, WITHOUT LONG-TERM CURRENT USE OF INSULIN: ICD-10-CM

## 2024-12-20 DIAGNOSIS — Z90.49 HISTORY OF PARTIAL COLECTOMY: Chronic | ICD-10-CM

## 2024-12-20 DIAGNOSIS — N40.1 BENIGN NON-NODULAR PROSTATIC HYPERPLASIA WITH LOWER URINARY TRACT SYMPTOMS: Chronic | ICD-10-CM

## 2024-12-20 DIAGNOSIS — E66.3 OVERWEIGHT (BMI 25.0-29.9): Chronic | ICD-10-CM

## 2024-12-20 DIAGNOSIS — R80.9 TYPE 2 DIABETES MELLITUS WITH MICROALBUMINURIA, WITHOUT LONG-TERM CURRENT USE OF INSULIN: ICD-10-CM

## 2024-12-20 DIAGNOSIS — R35.1 NOCTURIA: Chronic | ICD-10-CM

## 2024-12-20 DIAGNOSIS — Z51.81 THERAPEUTIC DRUG MONITORING: ICD-10-CM

## 2024-12-20 DIAGNOSIS — F17.210 NICOTINE DEPENDENCE, CIGARETTES, UNCOMPLICATED: Chronic | ICD-10-CM

## 2024-12-20 DIAGNOSIS — I10 BENIGN ESSENTIAL HYPERTENSION: Chronic | ICD-10-CM

## 2024-12-20 DIAGNOSIS — E53.8 VITAMIN B12 DEFICIENCY: ICD-10-CM

## 2024-12-20 DIAGNOSIS — Z80.0 FAMILY HISTORY OF COLON CANCER: Chronic | ICD-10-CM

## 2024-12-20 DIAGNOSIS — E78.2 MIXED HYPERLIPIDEMIA: Chronic | ICD-10-CM

## 2024-12-20 DIAGNOSIS — E55.9 VITAMIN D DEFICIENCY: Chronic | ICD-10-CM

## 2024-12-20 DIAGNOSIS — K52.9 CHRONIC DIARRHEA: Chronic | ICD-10-CM

## 2024-12-20 PROBLEM — R41.0 INTERMITTENT CONFUSION: Status: RESOLVED | Noted: 2023-09-05 | Resolved: 2024-12-20

## 2024-12-20 PROBLEM — Z86.0101 HISTORY OF ADENOMATOUS POLYP OF COLON: Status: ACTIVE | Noted: 2023-10-16

## 2024-12-20 PROBLEM — Z09 HOSPITAL DISCHARGE FOLLOW-UP: Status: RESOLVED | Noted: 2024-07-30 | Resolved: 2024-12-20

## 2024-12-20 PROBLEM — R55 SYNCOPE AND COLLAPSE: Status: RESOLVED | Noted: 2024-07-20 | Resolved: 2024-12-20

## 2024-12-20 LAB
25(OH)D3 SERPL-MCNC: 26.5 NG/ML (ref 30–100)
ALBUMIN SERPL-MCNC: 4.4 G/DL (ref 3.5–5.2)
ALBUMIN UR-MCNC: 20.1 MG/DL
ALBUMIN/GLOB SERPL: 1.5 G/DL
ALP SERPL-CCNC: 107 U/L (ref 39–117)
ALT SERPL W P-5'-P-CCNC: 19 U/L (ref 1–41)
ANION GAP SERPL CALCULATED.3IONS-SCNC: 12.7 MMOL/L (ref 5–15)
AST SERPL-CCNC: 24 U/L (ref 1–40)
BACTERIA UR QL AUTO: ABNORMAL /HPF
BILIRUB SERPL-MCNC: 0.6 MG/DL (ref 0–1.2)
BILIRUB UR QL STRIP: NEGATIVE
BUN SERPL-MCNC: 21 MG/DL (ref 8–23)
BUN/CREAT SERPL: 14.4 (ref 7–25)
CALCIUM SPEC-SCNC: 9.8 MG/DL (ref 8.6–10.5)
CHLORIDE SERPL-SCNC: 101 MMOL/L (ref 98–107)
CK SERPL-CCNC: 190 U/L (ref 20–200)
CLARITY UR: ABNORMAL
CO2 SERPL-SCNC: 25.3 MMOL/L (ref 22–29)
COLOR UR: ABNORMAL
CREAT SERPL-MCNC: 1.46 MG/DL (ref 0.76–1.27)
CREAT UR-MCNC: 218.2 MG/DL
DEPRECATED RDW RBC AUTO: 41.9 FL (ref 37–54)
EGFRCR SERPLBLD CKD-EPI 2021: 51.4 ML/MIN/1.73
ERYTHROCYTE [DISTWIDTH] IN BLOOD BY AUTOMATED COUNT: 12 % (ref 12.3–15.4)
GLOBULIN UR ELPH-MCNC: 3 GM/DL
GLUCOSE SERPL-MCNC: 293 MG/DL (ref 65–99)
GLUCOSE UR STRIP-MCNC: NEGATIVE MG/DL
HBA1C MFR BLD: 7.9 % (ref 4.8–5.6)
HCT VFR BLD AUTO: 45.7 % (ref 37.5–51)
HCYS SERPL-MCNC: 16.7 UMOL/L (ref 0–15)
HGB BLD-MCNC: 14.3 G/DL (ref 13–17.7)
HGB UR QL STRIP.AUTO: ABNORMAL
HYALINE CASTS UR QL AUTO: ABNORMAL /LPF
KETONES UR QL STRIP: ABNORMAL
LEUKOCYTE ESTERASE UR QL STRIP.AUTO: ABNORMAL
MCH RBC QN AUTO: 29.2 PG (ref 26.6–33)
MCHC RBC AUTO-ENTMCNC: 31.3 G/DL (ref 31.5–35.7)
MCV RBC AUTO: 93.3 FL (ref 79–97)
MICROALBUMIN/CREAT UR: 92.1 MG/G (ref 0–29)
NITRITE UR QL STRIP: POSITIVE
PH UR STRIP.AUTO: 5.5 [PH] (ref 5–8)
PLATELET # BLD AUTO: 174 10*3/MM3 (ref 140–450)
PMV BLD AUTO: 12.4 FL (ref 6–12)
POTASSIUM SERPL-SCNC: 4.7 MMOL/L (ref 3.5–5.2)
PROT SERPL-MCNC: 7.4 G/DL (ref 6–8.5)
PROT UR QL STRIP: ABNORMAL
PSA SERPL-MCNC: 2.31 NG/ML (ref 0–4)
RBC # BLD AUTO: 4.9 10*6/MM3 (ref 4.14–5.8)
RBC # UR STRIP: ABNORMAL /HPF
REF LAB TEST METHOD: ABNORMAL
SODIUM SERPL-SCNC: 139 MMOL/L (ref 136–145)
SP GR UR STRIP: 1.02 (ref 1–1.03)
SQUAMOUS #/AREA URNS HPF: ABNORMAL /HPF
T3FREE SERPL-MCNC: 3.52 PG/ML (ref 2–4.4)
T4 FREE SERPL-MCNC: 1.36 NG/DL (ref 0.92–1.68)
TSH SERPL DL<=0.05 MIU/L-ACNC: 3.65 UIU/ML (ref 0.27–4.2)
URATE SERPL-MCNC: 6.3 MG/DL (ref 3.4–7)
UROBILINOGEN UR QL STRIP: ABNORMAL
WBC # UR STRIP: ABNORMAL /HPF
WBC NRBC COR # BLD AUTO: 13.02 10*3/MM3 (ref 3.4–10.8)
YEAST URNS QL MICRO: PRESENT /HPF

## 2024-12-20 PROCEDURE — 80061 LIPID PANEL: CPT | Performed by: INTERNAL MEDICINE

## 2024-12-20 PROCEDURE — 83704 LIPOPROTEIN BLD QUAN PART: CPT | Performed by: INTERNAL MEDICINE

## 2024-12-20 PROCEDURE — 82550 ASSAY OF CK (CPK): CPT | Performed by: INTERNAL MEDICINE

## 2024-12-20 PROCEDURE — 85027 COMPLETE CBC AUTOMATED: CPT | Performed by: INTERNAL MEDICINE

## 2024-12-20 PROCEDURE — 83036 HEMOGLOBIN GLYCOSYLATED A1C: CPT | Performed by: INTERNAL MEDICINE

## 2024-12-20 PROCEDURE — 81001 URINALYSIS AUTO W/SCOPE: CPT | Performed by: INTERNAL MEDICINE

## 2024-12-20 PROCEDURE — 84443 ASSAY THYROID STIM HORMONE: CPT | Performed by: INTERNAL MEDICINE

## 2024-12-20 PROCEDURE — 36415 COLL VENOUS BLD VENIPUNCTURE: CPT | Performed by: INTERNAL MEDICINE

## 2024-12-20 PROCEDURE — 82570 ASSAY OF URINE CREATININE: CPT | Performed by: INTERNAL MEDICINE

## 2024-12-20 PROCEDURE — 84153 ASSAY OF PSA TOTAL: CPT | Performed by: INTERNAL MEDICINE

## 2024-12-20 PROCEDURE — 82043 UR ALBUMIN QUANTITATIVE: CPT | Performed by: INTERNAL MEDICINE

## 2024-12-20 PROCEDURE — 84439 ASSAY OF FREE THYROXINE: CPT | Performed by: INTERNAL MEDICINE

## 2024-12-20 PROCEDURE — 84550 ASSAY OF BLOOD/URIC ACID: CPT | Performed by: INTERNAL MEDICINE

## 2024-12-20 PROCEDURE — 80053 COMPREHEN METABOLIC PANEL: CPT | Performed by: INTERNAL MEDICINE

## 2024-12-20 PROCEDURE — 82306 VITAMIN D 25 HYDROXY: CPT | Performed by: INTERNAL MEDICINE

## 2024-12-20 PROCEDURE — 83921 ORGANIC ACID SINGLE QUANT: CPT | Performed by: INTERNAL MEDICINE

## 2024-12-20 PROCEDURE — 83090 ASSAY OF HOMOCYSTEINE: CPT | Performed by: INTERNAL MEDICINE

## 2024-12-20 PROCEDURE — 84481 FREE ASSAY (FT-3): CPT | Performed by: INTERNAL MEDICINE

## 2024-12-20 NOTE — ASSESSMENT & PLAN NOTE
Orders:    CK    Comprehensive Metabolic Panel    NMR LipoProfile    T4, Free    T3, Free    TSH    Homocysteine

## 2024-12-20 NOTE — ASSESSMENT & PLAN NOTE
Patient's (Body mass index is 28.65 kg/m².) indicates that they are overweight with health conditions that include hypertension, diabetes mellitus, dyslipidemias, and osteoarthritis . Weight is unchanged. BMI is above average; BMI management plan is completed. We discussed low calorie, low carb based diet program, portion control, and increasing exercise.

## 2024-12-20 NOTE — ASSESSMENT & PLAN NOTE
Orders:    Comprehensive Metabolic Panel    Hemoglobin A1c    Microalbumin / Creatinine Urine Ratio - Urine, Clean Catch    Urinalysis With Microscopic If Indicated (No Culture) - Urine, Clean Catch

## 2024-12-20 NOTE — PROGRESS NOTES
Subjective   The ABCs of the Annual Wellness Visit  Medicare Wellness Visit      Navarro Bruno is a 69 y.o. patient who presents for a Medicare Wellness Visit.    The following portions of the patient's history were reviewed and   updated as appropriate: allergies, current medications, past family history, past medical history, past social history, past surgical history, and problem list.    Compared to one year ago, the patient's physical   health is the same.  Compared to one year ago, the patient's mental   health is better.    Recent Hospitalizations:  He was not admitted to the hospital during the last year.     Current Medical Providers:  Patient Care Team:  Carlos Chavira MD as PCP - General (Internal Medicine)    Outpatient Medications Prior to Visit   Medication Sig Dispense Refill    allopurinol (ZYLOPRIM) 300 MG tablet TAKE 1 TABLET BY MOUTH EVERY DAY 90 tablet 1    amLODIPine (NORVASC) 2.5 MG tablet TAKE 1 TABLET BY MOUTH IN THE MORNING FOR BLOOD PRESSURE 30 tablet 0    atorvastatin (LIPITOR) 80 MG tablet TAKE 1 TABLET BY MOUTH EVERY DAY FOR CHOLESTEROL 90 tablet 0    B-D ULTRAFINE III SHORT PEN 31G X 8 MM misc Use with Victoza injections daily.  3    Bioflavonoid Products (SUPER C-500 PO) Take 500 mg by mouth Daily.      Calcium Citrate-Vitamin D (CALCIUM CITRATE + D3 MAXIMUM PO) Take 1 tablet by mouth Daily.      levothyroxine (SYNTHROID, LEVOTHROID) 50 MCG tablet TAKE 1 TABLET BY MOUTH EVERY DAY FOR LOW THYROID 90 tablet 2    metoprolol tartrate (LOPRESSOR) 25 MG tablet TAKE 1 TABLET BY MOUTH 2 TIMES A DAY 60 tablet 0    Probiotic Product (PROBIOTIC PO) Take 1 tablet by mouth Daily.      Semaglutide (Rybelsus) 7 MG tablet Take 7 mg by mouth Daily. 90 tablet 1    SITagliptin-metFORMIN HCl ER (Janumet XR)  MG tablet Take two tablets by mouth daily for diabetes. 90 tablet 3    vitamin D3 125 MCG (5000 UT) capsule capsule Take 1 capsule by mouth Daily.       Facility-Administered  Medications Prior to Visit   Medication Dose Route Frequency Provider Last Rate Last Admin    cyanocobalamin injection 1,000 mcg  1,000 mcg Intramuscular Q28 Days Betsy Pineda APRN   1,000 mcg at 09/18/24 0919    cyanocobalamin injection 1,000 mcg  1,000 mcg Intramuscular See Admin Instructions Betsy Pineda APRN   1,000 mcg at 08/13/24 1244    cyanocobalamin injection 1,000 mcg  1,000 mcg Intramuscular Q28 Days Betsy Pineda APRN   1,000 mcg at 08/20/24 0943    cyanocobalamin injection 1,000 mcg  1,000 mcg Intramuscular Q28 Days Betsy Pineda, APRN   1,000 mcg at 08/27/24 0947    cyanocobalamin injection 1,000 mcg  1,000 mcg Intramuscular Q28 Days Betsy Pineda APRN   1,000 mcg at 09/03/24 1019     No opioid medication identified on active medication list. I have reviewed chart for other potential  high risk medication/s and harmful drug interactions in the elderly.      Aspirin is not on active medication list.  Aspirin use is not indicated based on review of current medical condition/s. Risk of harm outweighs potential benefits.  .    Patient Active Problem List   Diagnosis    Chronic anemia    Benign essential hypertension    Hyperlipidemia    Primary hypothyroidism    Irritable bladder    Nocturia    Type 2 diabetes mellitus with microalbuminuria, without long-term current use of insulin    Vitamin D deficiency    Therapeutic drug monitoring    Chronic diarrhea    History of partial colectomy, 3/6/2022--ileocolic resection for stricture.  12/17/2014--ileo-cecal colectomy with primary reanastomosis.  Obstruction secondary to NSAID-induced inflamed ileum.    Gout    Benign prostatic hypertrophy    Diabetic eye exam    Diabetic foot exam    Nicotine dependence, cigarettes, uncomplicated    Medical non-compliance    Overweight (BMI 25.0-29.9)    Microalbuminuria    History of colon polyps    Family history of colon cancer    Vitamin B12 deficiency     Advance Care Planning Advance Directive is on  "file.  ACP discussion was held with the patient during this visit. Patient has an advance directive in EMR which is still valid.             Objective   Vitals:    24 1155   BP: 136/82   Pulse: 76   Resp: 16   Temp: 98 °F (36.7 °C)   TempSrc: Oral   SpO2: 98%   Weight: 103 kg (228 lb)   Height: 190 cm (74.8\")       Estimated body mass index is 28.65 kg/m² as calculated from the following:    Height as of this encounter: 190 cm (74.8\").    Weight as of this encounter: 103 kg (228 lb).            Does the patient have evidence of cognitive impairment? No                                                                                               Health  Risk Assessment    Smoking Status:  Social History     Tobacco Use   Smoking Status Every Day    Current packs/day: 1.00    Types: Cigarettes   Smokeless Tobacco Never   Tobacco Comments    STARTED IN HIGH SCHOOL OFF AND ON, QUIT FOR 10 YEARS, STARTED AGAIN 9 YEARS AGO     Alcohol Consumption:  Social History     Substance and Sexual Activity   Alcohol Use Not Currently       Fall Risk Screen  STEADI Fall Risk Assessment was completed, and patient is at LOW risk for falls.Assessment completed on:2024    Depression Screening   Little interest or pleasure in doing things? Not at all   Feeling down, depressed, or hopeless? Not at all   PHQ-2 Total Score 0      Health Habits and Functional and Cognitive Screenin/20/2024    11:57 AM   Functional & Cognitive Status   Do you have difficulty preparing food and eating? No   Do you have difficulty bathing yourself, getting dressed or grooming yourself? No   Do you have difficulty using the toilet? No   Do you have difficulty moving around from place to place? No   Do you have trouble with steps or getting out of a bed or a chair? No   Current Diet Limited Junk Food   Dental Exam Not up to date   Eye Exam Not up to date   Exercise (times per week) 0 times per week   Current Exercises Include No Regular " Exercise   Do you need help using the phone?  No   Are you deaf or do you have serious difficulty hearing?  No   Do you need help to go to places out of walking distance? No   Do you need help shopping? No   Do you need help preparing meals?  No   Do you need help with housework?  No   Do you need help with laundry? No   Do you need help taking your medications? No   Do you need help managing money? No   Do you ever drive or ride in a car without wearing a seat belt? No   Have you felt unusual stress, anger or loneliness in the last month? No   Who do you live with? Other   If you need help, do you have trouble finding someone available to you? No   Have you been bothered in the last four weeks by sexual problems? No   Do you have difficulty concentrating, remembering or making decisions? No           Age-appropriate Screening Schedule:  Refer to the list below for future screening recommendations based on patient's age, sex and/or medical conditions. Orders for these recommended tests are listed in the plan section. The patient has been provided with a written plan.    Health Maintenance List  Health Maintenance   Topic Date Due    ANNUAL WELLNESS VISIT  Never done    DIABETIC EYE EXAM  10/19/2023    LIPID PANEL  09/05/2024    URINE MICROALBUMIN  09/05/2024    DIABETIC FOOT EXAM  10/02/2024    HEMOGLOBIN A1C  01/20/2025    BMI FOLLOWUP  03/07/2025    TDAP/TD VACCINES (2 - Td or Tdap) 08/18/2027    HEPATITIS C SCREENING  Completed    Pneumococcal Vaccine 65+  Completed    AAA SCREEN (ONE-TIME)  Completed    COVID-19 Vaccine  Discontinued    INFLUENZA VACCINE  Discontinued    COLONOSCOPY  Discontinued    ZOSTER VACCINE  Discontinued                                                                                                                                                CMS Preventative Services Quick Reference  Risk Factors Identified During Encounter  Immunizations Discussed/Encouraged: Influenza    The above  risks/problems have been discussed with the patient.  Pertinent information has been shared with the patient in the After Visit Summary.  An After Visit Summary and PPPS were made available to the patient.    Follow Up:   Next Medicare Wellness visit to be scheduled in 1 year.     Assessment & Plan  Encounter for subsequent annual wellness visit (AWV) in Medicare patient         Type 2 diabetes mellitus with microalbuminuria, without long-term current use of insulin      Orders:    Comprehensive Metabolic Panel    Hemoglobin A1c    Microalbumin / Creatinine Urine Ratio - Urine, Clean Catch    Urinalysis With Microscopic If Indicated (No Culture) - Urine, Clean Catch    Microalbuminuria    Orders:    Microalbumin / Creatinine Urine Ratio - Urine, Clean Catch    Hyperlipidemia       Orders:    CK    Comprehensive Metabolic Panel    NMR LipoProfile    T4, Free    T3, Free    TSH    Homocysteine    Primary hypothyroidism         Chronic anemia    Orders:    CBC (No Diff)    Benign essential hypertension      Orders:    Comprehensive Metabolic Panel    Vitamin D deficiency    Orders:    Vitamin D,25-Hydroxy    Gout    Orders:    Uric Acid    Benign prostatic hypertrophy    Orders:    PSA DIAGNOSTIC    Vitamin B12 deficiency    Orders:    Methylmalonic Acid, Serum    History of colon polyps         Nicotine dependence, cigarettes, uncomplicated                                                 Overweight (BMI 25.0-29.9)  Patient's (Body mass index is 28.65 kg/m².) indicates that they are overweight with health conditions that include hypertension, diabetes mellitus, dyslipidemias, and osteoarthritis . Weight is unchanged. BMI is above average; BMI management plan is completed. We discussed low calorie, low carb based diet program, portion control, and increasing exercise.          History of partial colectomy, 3/6/2022--ileocolic resection for stricture.  12/17/2014--ileo-cecal colectomy with primary reanastomosis.   Obstruction secondary to NSAID-induced inflamed ileum.         Chronic diarrhea         Irritable bladder         Nocturia         Family history of colon cancer         Therapeutic drug monitoring         Need for influenza vaccination    Orders:    Fluzone High-Dose 65+yrs ([id number])         Follow Up:   No follow-ups on file.

## 2024-12-22 LAB
CHOLEST SERPL-MCNC: 126 MG/DL (ref 100–199)
HDL SERPL-SCNC: 31.5 UMOL/L
HDLC SERPL-MCNC: 52 MG/DL
LDL SERPL QN: 20.1 NM
LDL SERPL-SCNC: 593 NMOL/L
LDL SMALL SERPL-SCNC: 295 NMOL/L
LDLC SERPL CALC-MCNC: 46 MG/DL (ref 0–99)
TRIGL SERPL-MCNC: 170 MG/DL (ref 0–149)

## 2024-12-23 DIAGNOSIS — B37.49 CANDIDURIA: Primary | ICD-10-CM

## 2024-12-23 RX ORDER — FLUCONAZOLE 200 MG/1
TABLET ORAL
Qty: 7 TABLET | Refills: 0 | Status: SHIPPED | OUTPATIENT
Start: 2024-12-23

## 2024-12-24 LAB — METHYLMALONATE SERPL-SCNC: 379 NMOL/L (ref 0–378)

## 2025-01-14 ENCOUNTER — TELEPHONE (OUTPATIENT)
Dept: INTERNAL MEDICINE | Facility: CLINIC | Age: 70
End: 2025-01-14
Payer: MEDICARE

## 2025-01-15 DIAGNOSIS — I10 BENIGN ESSENTIAL HYPERTENSION: Chronic | ICD-10-CM

## 2025-01-15 RX ORDER — METOPROLOL TARTRATE 25 MG/1
TABLET, FILM COATED ORAL
Qty: 60 TABLET | Refills: 0 | Status: SHIPPED | OUTPATIENT
Start: 2025-01-15

## 2025-01-15 RX ORDER — AMLODIPINE BESYLATE 2.5 MG/1
TABLET ORAL
Qty: 30 TABLET | Refills: 0 | Status: SHIPPED | OUTPATIENT
Start: 2025-01-15

## 2025-03-13 ENCOUNTER — TELEPHONE (OUTPATIENT)
Dept: INTERNAL MEDICINE | Facility: CLINIC | Age: 70
End: 2025-03-13

## 2025-03-13 NOTE — TELEPHONE ENCOUNTER
Caller: Navarro Bruno    Relationship: Self    Best call back number: [phone]    What was the call regarding: PATIENT STATES THAT [employer] HAS BEEN TRYING TO REACH OFFICE TO OBTAIN  A FORM THAT STATES THE PATIENT IS UNDER DR. BRANHAM'S CARE FOR DIABETES CONDITION. PLEASE ADVISE ASAP.

## 2025-03-14 ENCOUNTER — TELEPHONE (OUTPATIENT)
Dept: INTERNAL MEDICINE | Facility: CLINIC | Age: 70
End: 2025-03-14
Payer: MEDICARE

## 2025-03-14 NOTE — TELEPHONE ENCOUNTER
I have tried calling this Pt to informed him that we have Not received any calls or fax paperwork from [facility]. I was not able to leave a VM, his mailbox is full.

## 2025-04-03 ENCOUNTER — TELEPHONE (OUTPATIENT)
Dept: INTERNAL MEDICINE | Facility: CLINIC | Age: 70
End: 2025-04-03

## 2025-04-03 NOTE — TELEPHONE ENCOUNTER
Caller: Navarro Bruno    Relationship: Self    Best call back number:[phone]     What is the best time to reach you: ANYTIME    Who are you requesting to speak with (clinical staff, provider,  specific staff member): CLINICAL    What was the call regarding: PATIENT CALLING WANTING TO KNOW WHAT HE NEEDS TO DO TO GET CLEARED TO GO BACK TO WORK AND WOULD HE HAVE ANY RESTRICTIONS.    Is it okay if the provider responds through MyChart: NO

## (undated) DEVICE — VESSEL SEALER EXTEND: Brand: ENDOWRIST

## (undated) DEVICE — BARRIER, ABSORBABLE, ADHESION: Brand: SEPRAFILM® PROCEDURE PACK

## (undated) DEVICE — STPLR SKIN VISISTAT WD 35CT

## (undated) DEVICE — CANN O2 ETCO2 FITS ALL CONN CO2 SMPL A/ 7IN DISP LF

## (undated) DEVICE — SUT PDS 0 CT1 36IN Z346H

## (undated) DEVICE — ANTIBACTERIAL UNDYED BRAIDED (POLYGLACTIN 910), SYNTHETIC ABSORBABLE SUTURE: Brand: COATED VICRYL

## (undated) DEVICE — TRAP FLD MINIVAC MEGADYNE 100ML

## (undated) DEVICE — ADAPT CLN BIOGUARD AIR/H2O DISP

## (undated) DEVICE — LN SMPL CO2 SHTRM SD STREAM W/M LUER

## (undated) DEVICE — SUT PDS 1 CT1 36IN Z347H

## (undated) DEVICE — SOL ANTISTICK CAUTRY ELECTROLUBE LF

## (undated) DEVICE — GLV SURG BIOGEL LTX PF 7 1/2

## (undated) DEVICE — COVER,MAYO STAND,STERILE: Brand: MEDLINE

## (undated) DEVICE — DRSNG WND BORDR/ADHS NONADHR/GZ LF 2X2IN STRL

## (undated) DEVICE — SUT SILK 0 SH 30IN K834H

## (undated) DEVICE — TUBING, SUCTION, 1/4" X 10', STRAIGHT: Brand: MEDLINE

## (undated) DEVICE — SEAL

## (undated) DEVICE — ECHELON FLEX 60 ARTICULATING ENDOSCOPIC LINEAR CUTTER (NO CARTRIDGE): Brand: ECHELON FLEX ENDOPATH

## (undated) DEVICE — ENDOPATH XCEL BLADELESS TROCARS WITH STABILITY SLEEVES: Brand: ENDOPATH XCEL

## (undated) DEVICE — TIP COVER ACCESSORY

## (undated) DEVICE — ACCESS PLATFORM FOR MINIMALLY INVASIVE SURGERY: Brand: GELPOINT®  MINI ADVANCED ACCESS PLATFORM

## (undated) DEVICE — COLUMN DRAPE

## (undated) DEVICE — DEV COND GAS LAP INSUFLOW W/LUER CONN

## (undated) DEVICE — JELLY,LUBE,STERILE,FLIP TOP,TUBE,4-OZ: Brand: MEDLINE

## (undated) DEVICE — POOLE SUCTION HANDLE: Brand: CARDINAL HEALTH

## (undated) DEVICE — BLADELESS OBTURATOR: Brand: WECK VISTA

## (undated) DEVICE — BLADELESS OBTURATOR, LONG: Brand: WECK VISTA

## (undated) DEVICE — DRSNG WND BORDR/ADHS NONADHR/GZ LF 4X14IN STRL

## (undated) DEVICE — ARM DRAPE

## (undated) DEVICE — LOU GENERAL ROBOT: Brand: MEDLINE INDUSTRIES, INC.

## (undated) DEVICE — TBG PENCL TELESCP MEGADYNE SMOKE EVAC 10FT

## (undated) DEVICE — LAPAROSCOPIC SMOKE ELIMINATION DEVICE: Brand: PNEUVIEW XE

## (undated) DEVICE — SUT MNCRYL PLS ANTIB UD 4/0 PS2 18IN

## (undated) DEVICE — TROCAR SITE CLOSURE DEVICE: Brand: ENDO CLOSE

## (undated) DEVICE — TOWEL,OR,DSP,ST,BLUE,STD,4/PK,20PK/CS: Brand: MEDLINE

## (undated) DEVICE — IRRIGATOR BULB ASEPTO 60CC STRL

## (undated) DEVICE — WOUND RETRACTOR AND PROTECTOR: Brand: ALEXIS O WOUND PROTECTOR-RETRACTOR

## (undated) DEVICE — SUT GUT CHRM 3/0 SH 27IN G122H

## (undated) DEVICE — SUT SILK 2/0 SH CR8 18IN CR8 C012D

## (undated) DEVICE — ENSEAL TEMPERATURE CONTROLLED TISSUE SEALING TECHNOLOGY DISPOSABLE TISSUE SEALING DEVICE TAPTRONIC TRIGGER ACTIVATED POWER 5MM JAW STYLE: Brand: ENSEAL

## (undated) DEVICE — SUT VIC 0/0 UR6 27IN DYED J603H

## (undated) DEVICE — 3M™ STERI-DRAPE™ INSTRUMENT POUCH 1018L: Brand: STERI-DRAPE™

## (undated) DEVICE — KT ORCA ORCAPOD DISP STRL

## (undated) DEVICE — SOL NACL 0.9PCT 1000ML

## (undated) DEVICE — CANNULA SEAL

## (undated) DEVICE — SUT VIC 0 TIES 18IN J912G

## (undated) DEVICE — VISUALIZATION SYSTEM: Brand: CLEARIFY

## (undated) DEVICE — TUBING, SUCTION, 1/4" X 20', STRAIGHT: Brand: MEDLINE INDUSTRIES, INC.

## (undated) DEVICE — MEDI-VAC YANK SUCT HNDL W/TPRD BULBOUS TIP: Brand: CARDINAL HEALTH

## (undated) DEVICE — REDUCER: Brand: ENDOWRIST

## (undated) DEVICE — SENSR O2 OXIMAX FNGR A/ 18IN NONSTR

## (undated) DEVICE — LEGGINGS, PAIR, CLEAR, STERILE: Brand: MEDLINE

## (undated) DEVICE — APPL CHLORAPREP HI/LITE 26ML ORNG